# Patient Record
Sex: FEMALE | Race: WHITE | NOT HISPANIC OR LATINO
[De-identification: names, ages, dates, MRNs, and addresses within clinical notes are randomized per-mention and may not be internally consistent; named-entity substitution may affect disease eponyms.]

---

## 2017-01-25 ENCOUNTER — APPOINTMENT (OUTPATIENT)
Dept: ORTHOPEDIC SURGERY | Facility: CLINIC | Age: 62
End: 2017-01-25

## 2017-01-25 VITALS
BODY MASS INDEX: 39.99 KG/M2 | DIASTOLIC BLOOD PRESSURE: 69 MMHG | SYSTOLIC BLOOD PRESSURE: 108 MMHG | WEIGHT: 240 LBS | HEIGHT: 65 IN | TEMPERATURE: 97.9 F | HEART RATE: 73 BPM

## 2017-01-25 DIAGNOSIS — E78.00 PURE HYPERCHOLESTEROLEMIA, UNSPECIFIED: ICD-10-CM

## 2017-01-25 DIAGNOSIS — Z82.61 FAMILY HISTORY OF ARTHRITIS: ICD-10-CM

## 2017-01-25 DIAGNOSIS — Z86.79 PERSONAL HISTORY OF OTHER DISEASES OF THE CIRCULATORY SYSTEM: ICD-10-CM

## 2017-01-25 DIAGNOSIS — Z87.39 PERSONAL HISTORY OF OTHER DISEASES OF THE MUSCULOSKELETAL SYSTEM AND CONNECTIVE TISSUE: ICD-10-CM

## 2017-01-25 DIAGNOSIS — M19.90 UNSPECIFIED OSTEOARTHRITIS, UNSPECIFIED SITE: ICD-10-CM

## 2017-01-25 DIAGNOSIS — Z87.891 PERSONAL HISTORY OF NICOTINE DEPENDENCE: ICD-10-CM

## 2017-01-25 DIAGNOSIS — Z78.9 OTHER SPECIFIED HEALTH STATUS: ICD-10-CM

## 2017-01-25 DIAGNOSIS — Z80.9 FAMILY HISTORY OF MALIGNANT NEOPLASM, UNSPECIFIED: ICD-10-CM

## 2017-02-13 ENCOUNTER — OUTPATIENT (OUTPATIENT)
Dept: OUTPATIENT SERVICES | Facility: HOSPITAL | Age: 62
LOS: 1 days | End: 2017-02-13
Payer: COMMERCIAL

## 2017-02-13 ENCOUNTER — APPOINTMENT (OUTPATIENT)
Dept: MAMMOGRAPHY | Facility: IMAGING CENTER | Age: 62
End: 2017-02-13

## 2017-02-13 DIAGNOSIS — Z12.31 ENCOUNTER FOR SCREENING MAMMOGRAM FOR MALIGNANT NEOPLASM OF BREAST: ICD-10-CM

## 2017-02-13 PROCEDURE — 77067 SCR MAMMO BI INCL CAD: CPT

## 2017-02-13 PROCEDURE — 77063 BREAST TOMOSYNTHESIS BI: CPT

## 2017-03-24 ENCOUNTER — APPOINTMENT (OUTPATIENT)
Dept: ORTHOPEDIC SURGERY | Facility: CLINIC | Age: 62
End: 2017-03-24

## 2017-03-24 VITALS
WEIGHT: 240 LBS | TEMPERATURE: 97.8 F | HEIGHT: 65 IN | BODY MASS INDEX: 39.99 KG/M2 | HEART RATE: 73 BPM | SYSTOLIC BLOOD PRESSURE: 127 MMHG | DIASTOLIC BLOOD PRESSURE: 78 MMHG

## 2017-03-24 DIAGNOSIS — M17.11 UNILATERAL PRIMARY OSTEOARTHRITIS, RIGHT KNEE: ICD-10-CM

## 2017-03-24 DIAGNOSIS — M17.12 UNILATERAL PRIMARY OSTEOARTHRITIS, LEFT KNEE: ICD-10-CM

## 2017-05-18 ENCOUNTER — OUTPATIENT (OUTPATIENT)
Dept: OUTPATIENT SERVICES | Facility: HOSPITAL | Age: 62
LOS: 1 days | End: 2017-05-18
Payer: COMMERCIAL

## 2017-05-18 VITALS
HEART RATE: 74 BPM | TEMPERATURE: 98 F | HEIGHT: 65 IN | RESPIRATION RATE: 16 BRPM | WEIGHT: 222.23 LBS | SYSTOLIC BLOOD PRESSURE: 131 MMHG | DIASTOLIC BLOOD PRESSURE: 78 MMHG

## 2017-05-18 DIAGNOSIS — I10 ESSENTIAL (PRIMARY) HYPERTENSION: ICD-10-CM

## 2017-05-18 DIAGNOSIS — M17.0 BILATERAL PRIMARY OSTEOARTHRITIS OF KNEE: ICD-10-CM

## 2017-05-18 DIAGNOSIS — Z98.890 OTHER SPECIFIED POSTPROCEDURAL STATES: Chronic | ICD-10-CM

## 2017-05-18 DIAGNOSIS — Z98.1 ARTHRODESIS STATUS: Chronic | ICD-10-CM

## 2017-05-18 DIAGNOSIS — Z01.818 ENCOUNTER FOR OTHER PREPROCEDURAL EXAMINATION: ICD-10-CM

## 2017-05-18 DIAGNOSIS — Z90.710 ACQUIRED ABSENCE OF BOTH CERVIX AND UTERUS: Chronic | ICD-10-CM

## 2017-05-18 DIAGNOSIS — Z90.49 ACQUIRED ABSENCE OF OTHER SPECIFIED PARTS OF DIGESTIVE TRACT: Chronic | ICD-10-CM

## 2017-05-18 LAB
ALBUMIN SERPL ELPH-MCNC: 4.3 G/DL — SIGNIFICANT CHANGE UP (ref 3.3–5.2)
ALP SERPL-CCNC: 83 U/L — SIGNIFICANT CHANGE UP (ref 40–120)
ALT FLD-CCNC: 16 U/L — SIGNIFICANT CHANGE UP
ANION GAP SERPL CALC-SCNC: 12 MMOL/L — SIGNIFICANT CHANGE UP (ref 5–17)
APTT BLD: 33.4 SEC — SIGNIFICANT CHANGE UP (ref 27.5–37.4)
AST SERPL-CCNC: 17 U/L — SIGNIFICANT CHANGE UP
BASOPHILS # BLD AUTO: 0.1 K/UL — SIGNIFICANT CHANGE UP (ref 0–0.2)
BASOPHILS NFR BLD AUTO: 1 % — SIGNIFICANT CHANGE UP (ref 0–2)
BILIRUB SERPL-MCNC: 0.3 MG/DL — LOW (ref 0.4–2)
BLD GP AB SCN SERPL QL: SIGNIFICANT CHANGE UP
BUN SERPL-MCNC: 18 MG/DL — SIGNIFICANT CHANGE UP (ref 8–20)
CALCIUM SERPL-MCNC: 10.5 MG/DL — HIGH (ref 8.6–10.2)
CHLORIDE SERPL-SCNC: 101 MMOL/L — SIGNIFICANT CHANGE UP (ref 98–107)
CO2 SERPL-SCNC: 28 MMOL/L — SIGNIFICANT CHANGE UP (ref 22–29)
CREAT SERPL-MCNC: 0.76 MG/DL — SIGNIFICANT CHANGE UP (ref 0.5–1.3)
EOSINOPHIL # BLD AUTO: 0.1 K/UL — SIGNIFICANT CHANGE UP (ref 0–0.5)
EOSINOPHIL NFR BLD AUTO: 1.6 % — SIGNIFICANT CHANGE UP (ref 0–6)
GLUCOSE SERPL-MCNC: 111 MG/DL — SIGNIFICANT CHANGE UP (ref 70–115)
HCT VFR BLD CALC: 42 % — SIGNIFICANT CHANGE UP (ref 37–47)
HGB BLD-MCNC: 14 G/DL — SIGNIFICANT CHANGE UP (ref 12–16)
INR BLD: 0.97 RATIO — SIGNIFICANT CHANGE UP (ref 0.88–1.16)
LYMPHOCYTES # BLD AUTO: 2.1 K/UL — SIGNIFICANT CHANGE UP (ref 1–4.8)
LYMPHOCYTES # BLD AUTO: 33.9 % — SIGNIFICANT CHANGE UP (ref 20–55)
MCHC RBC-ENTMCNC: 30.5 PG — SIGNIFICANT CHANGE UP (ref 27–31)
MCHC RBC-ENTMCNC: 33.3 G/DL — SIGNIFICANT CHANGE UP (ref 32–36)
MCV RBC AUTO: 91.5 FL — SIGNIFICANT CHANGE UP (ref 81–99)
MONOCYTES # BLD AUTO: 0.6 K/UL — SIGNIFICANT CHANGE UP (ref 0–0.8)
MONOCYTES NFR BLD AUTO: 10.1 % — HIGH (ref 3–10)
MRSA PCR RESULT.: SIGNIFICANT CHANGE UP
NEUTROPHILS # BLD AUTO: 3.3 K/UL — SIGNIFICANT CHANGE UP (ref 1.8–8)
NEUTROPHILS NFR BLD AUTO: 53.2 % — SIGNIFICANT CHANGE UP (ref 37–73)
PLATELET # BLD AUTO: 291 K/UL — SIGNIFICANT CHANGE UP (ref 150–400)
POTASSIUM SERPL-MCNC: 4.8 MMOL/L — SIGNIFICANT CHANGE UP (ref 3.5–5.3)
POTASSIUM SERPL-SCNC: 4.8 MMOL/L — SIGNIFICANT CHANGE UP (ref 3.5–5.3)
PROT SERPL-MCNC: 7 G/DL — SIGNIFICANT CHANGE UP (ref 6.6–8.7)
PROTHROM AB SERPL-ACNC: 10.7 SEC — SIGNIFICANT CHANGE UP (ref 9.8–12.7)
RBC # BLD: 4.59 M/UL — SIGNIFICANT CHANGE UP (ref 4.4–5.2)
RBC # FLD: 14.5 % — SIGNIFICANT CHANGE UP (ref 11–15.6)
S AUREUS DNA NOSE QL NAA+PROBE: DETECTED
SODIUM SERPL-SCNC: 141 MMOL/L — SIGNIFICANT CHANGE UP (ref 135–145)
TYPE + AB SCN PNL BLD: SIGNIFICANT CHANGE UP
WBC # BLD: 6.1 K/UL — SIGNIFICANT CHANGE UP (ref 4.8–10.8)
WBC # FLD AUTO: 6.1 K/UL — SIGNIFICANT CHANGE UP (ref 4.8–10.8)

## 2017-05-18 PROCEDURE — 93005 ELECTROCARDIOGRAM TRACING: CPT

## 2017-05-18 PROCEDURE — 80053 COMPREHEN METABOLIC PANEL: CPT

## 2017-05-18 PROCEDURE — 86901 BLOOD TYPING SEROLOGIC RH(D): CPT

## 2017-05-18 PROCEDURE — 86900 BLOOD TYPING SEROLOGIC ABO: CPT

## 2017-05-18 PROCEDURE — 87641 MR-STAPH DNA AMP PROBE: CPT

## 2017-05-18 PROCEDURE — 93010 ELECTROCARDIOGRAM REPORT: CPT

## 2017-05-18 PROCEDURE — 86850 RBC ANTIBODY SCREEN: CPT

## 2017-05-18 PROCEDURE — G0463: CPT

## 2017-05-18 PROCEDURE — 85730 THROMBOPLASTIN TIME PARTIAL: CPT

## 2017-05-18 PROCEDURE — 87640 STAPH A DNA AMP PROBE: CPT

## 2017-05-18 PROCEDURE — 85610 PROTHROMBIN TIME: CPT

## 2017-05-18 PROCEDURE — 85027 COMPLETE CBC AUTOMATED: CPT

## 2017-05-18 NOTE — H&P PST ADULT - PSH
S/P carpal tunnel release  R  S/P cholecystectomy    S/P hernia repair    S/P hysterectomy    S/P lumbar fusion    S/P shoulder surgery  bilat rotator cuff  S/P thyroid surgery

## 2017-05-18 NOTE — H&P PST ADULT - NSANTHOSAYNRD_GEN_A_CORE
No. EV screening performed.  STOP BANG Legend: 0-2 = LOW Risk; 3-4 = INTERMEDIATE Risk; 5-8 = HIGH Risk

## 2017-05-18 NOTE — H&P PST ADULT - FAMILY HISTORY
Father  Still living? No  Family history of lung cancer, Age at diagnosis: 61-70     Mother  Still living? No  Family history of coronary artery disease, Age at diagnosis: 71-80

## 2017-05-19 RX ORDER — MUPIROCIN 20 MG/G
1 OINTMENT TOPICAL
Qty: 1 | Refills: 0 | OUTPATIENT
Start: 2017-05-19 | End: 2017-05-24

## 2017-06-02 ENCOUNTER — APPOINTMENT (OUTPATIENT)
Dept: ORTHOPEDIC SURGERY | Facility: CLINIC | Age: 62
End: 2017-06-02
Payer: COMMERCIAL

## 2017-06-02 VITALS
WEIGHT: 240 LBS | DIASTOLIC BLOOD PRESSURE: 70 MMHG | HEART RATE: 91 BPM | TEMPERATURE: 97.9 F | BODY MASS INDEX: 39.99 KG/M2 | SYSTOLIC BLOOD PRESSURE: 126 MMHG | HEIGHT: 65 IN

## 2017-06-02 PROCEDURE — 99214 OFFICE O/P EST MOD 30 MIN: CPT | Mod: 25

## 2017-06-02 PROCEDURE — 73502 X-RAY EXAM HIP UNI 2-3 VIEWS: CPT | Mod: LT

## 2017-06-02 PROCEDURE — 20610 DRAIN/INJ JOINT/BURSA W/O US: CPT | Mod: LT

## 2017-06-06 RX ORDER — CELECOXIB 200 MG/1
400 CAPSULE ORAL ONCE
Qty: 0 | Refills: 0 | Status: COMPLETED | OUTPATIENT
Start: 2017-06-08 | End: 2017-06-08

## 2017-06-06 RX ORDER — GABAPENTIN 400 MG/1
600 CAPSULE ORAL ONCE
Qty: 0 | Refills: 0 | Status: COMPLETED | OUTPATIENT
Start: 2017-06-08 | End: 2017-06-08

## 2017-06-06 RX ORDER — VANCOMYCIN HCL 1 G
1500 VIAL (EA) INTRAVENOUS ONCE
Qty: 0 | Refills: 0 | Status: DISCONTINUED | OUTPATIENT
Start: 2017-06-08 | End: 2017-06-13

## 2017-06-06 RX ORDER — OXYCODONE HYDROCHLORIDE 5 MG/1
20 TABLET ORAL ONCE
Qty: 0 | Refills: 0 | Status: DISCONTINUED | OUTPATIENT
Start: 2017-06-08 | End: 2017-06-08

## 2017-06-08 ENCOUNTER — RESULT REVIEW (OUTPATIENT)
Age: 62
End: 2017-06-08

## 2017-06-08 ENCOUNTER — INPATIENT (INPATIENT)
Facility: HOSPITAL | Age: 62
LOS: 4 days | Discharge: NURSING, REHAB OR LONG-TERM CARE | DRG: 462 | End: 2017-06-13
Attending: ORTHOPAEDIC SURGERY | Admitting: ORTHOPAEDIC SURGERY
Payer: COMMERCIAL

## 2017-06-08 ENCOUNTER — APPOINTMENT (OUTPATIENT)
Dept: ORTHOPEDIC SURGERY | Facility: HOSPITAL | Age: 62
End: 2017-06-08

## 2017-06-08 VITALS
SYSTOLIC BLOOD PRESSURE: 146 MMHG | TEMPERATURE: 98 F | DIASTOLIC BLOOD PRESSURE: 75 MMHG | RESPIRATION RATE: 16 BRPM | WEIGHT: 222.01 LBS | OXYGEN SATURATION: 100 % | HEIGHT: 65 IN | HEART RATE: 68 BPM

## 2017-06-08 DIAGNOSIS — M17.0 BILATERAL PRIMARY OSTEOARTHRITIS OF KNEE: ICD-10-CM

## 2017-06-08 DIAGNOSIS — Z29.9 ENCOUNTER FOR PROPHYLACTIC MEASURES, UNSPECIFIED: ICD-10-CM

## 2017-06-08 DIAGNOSIS — Z98.890 OTHER SPECIFIED POSTPROCEDURAL STATES: Chronic | ICD-10-CM

## 2017-06-08 DIAGNOSIS — M1A.0720 IDIOPATHIC CHRONIC GOUT, LEFT ANKLE AND FOOT, WITHOUT TOPHUS (TOPHI): ICD-10-CM

## 2017-06-08 DIAGNOSIS — Z96.653 PRESENCE OF ARTIFICIAL KNEE JOINT, BILATERAL: ICD-10-CM

## 2017-06-08 DIAGNOSIS — Z90.49 ACQUIRED ABSENCE OF OTHER SPECIFIED PARTS OF DIGESTIVE TRACT: Chronic | ICD-10-CM

## 2017-06-08 DIAGNOSIS — E04.1 NONTOXIC SINGLE THYROID NODULE: ICD-10-CM

## 2017-06-08 DIAGNOSIS — Z98.1 ARTHRODESIS STATUS: Chronic | ICD-10-CM

## 2017-06-08 DIAGNOSIS — I10 ESSENTIAL (PRIMARY) HYPERTENSION: ICD-10-CM

## 2017-06-08 DIAGNOSIS — Z90.710 ACQUIRED ABSENCE OF BOTH CERVIX AND UTERUS: Chronic | ICD-10-CM

## 2017-06-08 LAB
BLD GP AB SCN SERPL QL: SIGNIFICANT CHANGE UP
TYPE + AB SCN PNL BLD: SIGNIFICANT CHANGE UP

## 2017-06-08 PROCEDURE — 27447 TOTAL KNEE ARTHROPLASTY: CPT | Mod: 50

## 2017-06-08 PROCEDURE — 73562 X-RAY EXAM OF KNEE 3: CPT | Mod: 26,50

## 2017-06-08 PROCEDURE — 99232 SBSQ HOSP IP/OBS MODERATE 35: CPT

## 2017-06-08 PROCEDURE — 20985 CPTR-ASST DIR MS PX: CPT | Mod: 59

## 2017-06-08 PROCEDURE — 27447 TOTAL KNEE ARTHROPLASTY: CPT | Mod: AS,50

## 2017-06-08 PROCEDURE — 73560 X-RAY EXAM OF KNEE 1 OR 2: CPT | Mod: 26,50

## 2017-06-08 PROCEDURE — 88305 TISSUE EXAM BY PATHOLOGIST: CPT | Mod: 26

## 2017-06-08 PROCEDURE — 88311 DECALCIFY TISSUE: CPT | Mod: 26

## 2017-06-08 RX ORDER — ACETAMINOPHEN 500 MG
1000 TABLET ORAL ONCE
Qty: 0 | Refills: 0 | Status: DISCONTINUED | OUTPATIENT
Start: 2017-06-08 | End: 2017-06-08

## 2017-06-08 RX ORDER — FOLIC ACID 0.8 MG
1 TABLET ORAL DAILY
Qty: 0 | Refills: 0 | Status: DISCONTINUED | OUTPATIENT
Start: 2017-06-08 | End: 2017-06-13

## 2017-06-08 RX ORDER — OXYCODONE HYDROCHLORIDE 5 MG/1
5 TABLET ORAL
Qty: 0 | Refills: 0 | Status: DISCONTINUED | OUTPATIENT
Start: 2017-06-08 | End: 2017-06-13

## 2017-06-08 RX ORDER — SODIUM CHLORIDE 9 MG/ML
3 INJECTION INTRAMUSCULAR; INTRAVENOUS; SUBCUTANEOUS EVERY 8 HOURS
Qty: 0 | Refills: 0 | Status: DISCONTINUED | OUTPATIENT
Start: 2017-06-08 | End: 2017-06-08

## 2017-06-08 RX ORDER — FAMOTIDINE 10 MG/ML
20 INJECTION INTRAVENOUS
Qty: 0 | Refills: 0 | Status: DISCONTINUED | OUTPATIENT
Start: 2017-06-08 | End: 2017-06-13

## 2017-06-08 RX ORDER — FERROUS SULFATE 325(65) MG
325 TABLET ORAL
Qty: 0 | Refills: 0 | Status: DISCONTINUED | OUTPATIENT
Start: 2017-06-08 | End: 2017-06-13

## 2017-06-08 RX ORDER — METOPROLOL TARTRATE 50 MG
100 TABLET ORAL
Qty: 0 | Refills: 0 | Status: DISCONTINUED | OUTPATIENT
Start: 2017-06-08 | End: 2017-06-12

## 2017-06-08 RX ORDER — METOPROLOL TARTRATE 50 MG
50 TABLET ORAL
Qty: 0 | Refills: 0 | Status: DISCONTINUED | OUTPATIENT
Start: 2017-06-08 | End: 2017-06-13

## 2017-06-08 RX ORDER — ENOXAPARIN SODIUM 100 MG/ML
30 INJECTION SUBCUTANEOUS
Qty: 0 | Refills: 0 | Status: DISCONTINUED | OUTPATIENT
Start: 2017-06-08 | End: 2017-06-13

## 2017-06-08 RX ORDER — DOCUSATE SODIUM 100 MG
100 CAPSULE ORAL THREE TIMES A DAY
Qty: 0 | Refills: 0 | Status: DISCONTINUED | OUTPATIENT
Start: 2017-06-08 | End: 2017-06-13

## 2017-06-08 RX ORDER — CEFAZOLIN SODIUM 1 G
2000 VIAL (EA) INJECTION ONCE
Qty: 0 | Refills: 0 | Status: DISCONTINUED | OUTPATIENT
Start: 2017-06-08 | End: 2017-06-08

## 2017-06-08 RX ORDER — SENNA PLUS 8.6 MG/1
2 TABLET ORAL AT BEDTIME
Qty: 0 | Refills: 0 | Status: DISCONTINUED | OUTPATIENT
Start: 2017-06-08 | End: 2017-06-13

## 2017-06-08 RX ORDER — MAGNESIUM HYDROXIDE 400 MG/1
30 TABLET, CHEWABLE ORAL DAILY
Qty: 0 | Refills: 0 | Status: DISCONTINUED | OUTPATIENT
Start: 2017-06-08 | End: 2017-06-13

## 2017-06-08 RX ORDER — KETOROLAC TROMETHAMINE 30 MG/ML
15 SYRINGE (ML) INJECTION EVERY 6 HOURS
Qty: 0 | Refills: 0 | Status: DISCONTINUED | OUTPATIENT
Start: 2017-06-08 | End: 2017-06-08

## 2017-06-08 RX ORDER — FENTANYL CITRATE 50 UG/ML
50 INJECTION INTRAVENOUS
Qty: 0 | Refills: 0 | Status: DISCONTINUED | OUTPATIENT
Start: 2017-06-08 | End: 2017-06-08

## 2017-06-08 RX ORDER — TRANEXAMIC ACID 100 MG/ML
1000 INJECTION, SOLUTION INTRAVENOUS ONCE
Qty: 0 | Refills: 0 | Status: DISCONTINUED | OUTPATIENT
Start: 2017-06-08 | End: 2017-06-08

## 2017-06-08 RX ORDER — OXYCODONE HYDROCHLORIDE 5 MG/1
10 TABLET ORAL
Qty: 0 | Refills: 0 | Status: DISCONTINUED | OUTPATIENT
Start: 2017-06-08 | End: 2017-06-13

## 2017-06-08 RX ORDER — VALSARTAN 80 MG/1
80 TABLET ORAL DAILY
Qty: 0 | Refills: 0 | Status: DISCONTINUED | OUTPATIENT
Start: 2017-06-08 | End: 2017-06-11

## 2017-06-08 RX ORDER — ALLOPURINOL 300 MG
300 TABLET ORAL DAILY
Qty: 0 | Refills: 0 | Status: DISCONTINUED | OUTPATIENT
Start: 2017-06-08 | End: 2017-06-13

## 2017-06-08 RX ORDER — ONDANSETRON 8 MG/1
4 TABLET, FILM COATED ORAL ONCE
Qty: 0 | Refills: 0 | Status: DISCONTINUED | OUTPATIENT
Start: 2017-06-08 | End: 2017-06-08

## 2017-06-08 RX ORDER — VANCOMYCIN HCL 1 G
1500 VIAL (EA) INTRAVENOUS
Qty: 0 | Refills: 0 | Status: COMPLETED | OUTPATIENT
Start: 2017-06-08 | End: 2017-06-08

## 2017-06-08 RX ORDER — CEFAZOLIN SODIUM 1 G
2000 VIAL (EA) INJECTION
Qty: 0 | Refills: 0 | Status: COMPLETED | OUTPATIENT
Start: 2017-06-08 | End: 2017-06-08

## 2017-06-08 RX ORDER — FUROSEMIDE 40 MG
20 TABLET ORAL DAILY
Qty: 0 | Refills: 0 | Status: DISCONTINUED | OUTPATIENT
Start: 2017-06-09 | End: 2017-06-13

## 2017-06-08 RX ORDER — HYDROMORPHONE HYDROCHLORIDE 2 MG/ML
0.5 INJECTION INTRAMUSCULAR; INTRAVENOUS; SUBCUTANEOUS EVERY 4 HOURS
Qty: 0 | Refills: 0 | Status: DISCONTINUED | OUTPATIENT
Start: 2017-06-08 | End: 2017-06-13

## 2017-06-08 RX ORDER — ACETAMINOPHEN 500 MG
650 TABLET ORAL EVERY 6 HOURS
Qty: 0 | Refills: 0 | Status: DISCONTINUED | OUTPATIENT
Start: 2017-06-08 | End: 2017-06-13

## 2017-06-08 RX ORDER — SODIUM CHLORIDE 9 MG/ML
1000 INJECTION, SOLUTION INTRAVENOUS
Qty: 0 | Refills: 0 | Status: DISCONTINUED | OUTPATIENT
Start: 2017-06-08 | End: 2017-06-09

## 2017-06-08 RX ORDER — OXYCODONE HYDROCHLORIDE 5 MG/1
10 TABLET ORAL EVERY 12 HOURS
Qty: 0 | Refills: 0 | Status: DISCONTINUED | OUTPATIENT
Start: 2017-06-08 | End: 2017-06-10

## 2017-06-08 RX ORDER — ONDANSETRON 8 MG/1
4 TABLET, FILM COATED ORAL EVERY 6 HOURS
Qty: 0 | Refills: 0 | Status: DISCONTINUED | OUTPATIENT
Start: 2017-06-08 | End: 2017-06-13

## 2017-06-08 RX ADMIN — OXYCODONE HYDROCHLORIDE 10 MILLIGRAM(S): 5 TABLET ORAL at 21:49

## 2017-06-08 RX ADMIN — Medication 100 MILLIGRAM(S): at 21:07

## 2017-06-08 RX ADMIN — Medication 300 MILLIGRAM(S): at 19:47

## 2017-06-08 RX ADMIN — OXYCODONE HYDROCHLORIDE 20 MILLIGRAM(S): 5 TABLET ORAL at 08:32

## 2017-06-08 RX ADMIN — Medication 15 MILLIGRAM(S): at 23:37

## 2017-06-08 RX ADMIN — Medication 15 MILLIGRAM(S): at 18:37

## 2017-06-08 RX ADMIN — Medication 15 MILLIGRAM(S): at 19:30

## 2017-06-08 RX ADMIN — OXYCODONE HYDROCHLORIDE 10 MILLIGRAM(S): 5 TABLET ORAL at 16:13

## 2017-06-08 RX ADMIN — CELECOXIB 400 MILLIGRAM(S): 200 CAPSULE ORAL at 08:31

## 2017-06-08 RX ADMIN — Medication 15 MILLIGRAM(S): at 23:59

## 2017-06-08 RX ADMIN — OXYCODONE HYDROCHLORIDE 10 MILLIGRAM(S): 5 TABLET ORAL at 18:37

## 2017-06-08 RX ADMIN — OXYCODONE HYDROCHLORIDE 10 MILLIGRAM(S): 5 TABLET ORAL at 21:10

## 2017-06-08 RX ADMIN — FAMOTIDINE 20 MILLIGRAM(S): 10 INJECTION INTRAVENOUS at 18:42

## 2017-06-08 RX ADMIN — OXYCODONE HYDROCHLORIDE 10 MILLIGRAM(S): 5 TABLET ORAL at 16:43

## 2017-06-08 RX ADMIN — Medication 100 MILLIGRAM(S): at 21:08

## 2017-06-08 RX ADMIN — Medication 100 MILLIGRAM(S): at 16:08

## 2017-06-08 RX ADMIN — GABAPENTIN 600 MILLIGRAM(S): 400 CAPSULE ORAL at 08:32

## 2017-06-08 RX ADMIN — OXYCODONE HYDROCHLORIDE 10 MILLIGRAM(S): 5 TABLET ORAL at 19:07

## 2017-06-08 NOTE — DISCHARGE NOTE ADULT - PLAN OF CARE
Decrease Pain, Improve Ambulation and Activities of Daily Living The patient will be seen in the office between 2-3 weeks for wound check. Tape will be removed at that time. Patient may shower after post-op day #5. The dressing is to be removed on day # 7 (6/16/2017).  The patient will contact the office if the wound becomes red, has increasing pain, develops bleeding or discharge, an injury occurs, or has other concerns. The patient will continue PT consistent with total knee replacement. The patient will continue LOVENOX for 2 weeks and then begin ASPIRIN for DVTP. The patient will take OXYCODONE and TYLENOL for pain control and titrate according to prescription and patient needs. The patient is FULL weight bearing. Elevation of the lower leg is recommended to reduce swelling.

## 2017-06-08 NOTE — DISCHARGE NOTE ADULT - CARE PLAN
Principal Discharge DX:	Primary osteoarthritis of both knees  Goal:	Decrease Pain, Improve Ambulation and Activities of Daily Living  Instructions for follow-up, activity and diet:	The patient will be seen in the office between 2-3 weeks for wound check. Tape will be removed at that time. Patient may shower after post-op day #5. The dressing is to be removed on day # 7 (6/16/2017).  The patient will contact the office if the wound becomes red, has increasing pain, develops bleeding or discharge, an injury occurs, or has other concerns. The patient will continue PT consistent with total knee replacement. The patient will continue LOVENOX for 2 weeks and then begin ASPIRIN for DVTP. The patient will take OXYCODONE and TYLENOL for pain control and titrate according to prescription and patient needs. The patient is FULL weight bearing. Elevation of the lower leg is recommended to reduce swelling.

## 2017-06-08 NOTE — PHYSICAL THERAPY INITIAL EVALUATION ADULT - IMPAIRMENTS CONTRIBUTING TO GAIT DEVIATIONS, PT EVAL
impaired sensory feedback/impaired coordination/decreased strength/+ LOB x2 2*2 left knee buckling, verbal cues needed to maintain eyes open, standing rest breaks x2 2*2 c/o dizziness, chair follow for final 25 feet for safety 2*2 c/o dizziness and knee buckling/impaired motor control/decreased ROM/impaired balance

## 2017-06-08 NOTE — PHYSICAL THERAPY INITIAL EVALUATION ADULT - CRITERIA FOR SKILLED THERAPEUTIC INTERVENTIONS
Home with home PT, RW/anticipated discharge recommendation/anticipated equipment needs at discharge/impairments found

## 2017-06-08 NOTE — PHYSICAL THERAPY INITIAL EVALUATION ADULT - ADDITIONAL COMMENTS
Pt lives in a private home with her , 3 steps to enter with handrails, 13 steps inside with handrails to bedrooms. Pt was independent PTA without assist device. Pt owns RW, raised toilet seat and shower chair.

## 2017-06-08 NOTE — DISCHARGE NOTE ADULT - CARE PROVIDER_API CALL
Agus Harrell), Orthopaedic Surgery  [address] [place] [address]  Phone: [phone]  Fax: [phone]

## 2017-06-08 NOTE — PHYSICAL THERAPY INITIAL EVALUATION ADULT - RANGE OF MOTION EXAMINATION, REHAB EVAL
bilateral knee flexion to approx 35-40 degrees (in supine) left knee extension lacking approx 5-10 degrees extension, Right knee lacking approx 10-15 degrees extension/bilateral upper extremity ROM was WFL (within functional limits)

## 2017-06-08 NOTE — CONSULT NOTE ADULT - SUBJECTIVE AND OBJECTIVE BOX
PMD :Collin   Cardio :     Patient seen and examined , s/p B/L TKA , POD # 0 , seen in PACU      HPI:  62 yo female with arthritis bilateral knee worsening over years      PAST MEDICAL & SURGICAL HISTORY:  Thyroid nodule  Hypertension  S/P hysterectomy  S/P carpal tunnel release: R  S/P thyroid surgery  S/P hernia repair  S/P shoulder surgery: bilat rotator cuff  S/P lumbar fusion  S/P cholecystectomy      Social History:  · Marital Status	  · Occupation	clerical  · Lives With	spouse    Substance Use History:  · Substance Use	caffeine  · Caffeine Type	coffee  · Caffeine Amount/Frequency	1-2 cups/cans per day    Alcohol Use History:  · Have you ever consumed alcohol	never    Tobacco Usage:  · Tobacco Usage: Former smoker  · Tobacco Type: cigarettes  using e cig  · Number of Packs per Day: 1  · Number of yrs: 12  · Pack yrs: 12  · Longest Period Tobacco-Free: 1 year        FAMILY HISTORY:  Family history of coronary artery disease (Mother)  Family history of lung cancer (Father)      Allergies    No Known Allergies    Intolerances        HOME MEDICATIONS :     · 	metoprolol tartrate 100 mg oral tablet: Last Dose Taken:  , 1 tab(s) orally once a day  · 	valsartan 80 mg oral tablet: Last Dose Taken:  , 1 tab(s) orally once a day  · 	Lasix 20 mg oral tablet: Last Dose Taken:  , 1 tab(s) orally once a day  · 	allopurinol 300 mg oral tablet: Last Dose Taken:  , 1 tab(s) orally once a day  · 	meloxicam 15 mg oral tablet: Last Dose Taken:  , 1 tab(s) orally once a day  · 	Zantac 150 oral tablet: Last Dose Taken:  , 1 tab(s) orally 2 times a day  · 	Colace 100 mg oral capsule: Last Dose Taken:  , 1 cap(s) orally 2 times a day  · 	oxycodone-acetaminophen 5mg-325mg oral tablet: Last Dose Taken:  , 1 tab(s) orally 2 times a day, As Needed  · 	Folbic oral tablet: Last Dose Taken:  , 1 tab(s) orally once a day  · 	Vitamin D2 50,000 intl units (1.25 mg) oral capsule: Last Dose Taken:  , 1 cap(s) orally once a week    REVIEW OF SYSTEMS:    CONSTITUTIONAL: No fever, weight loss, or fatigue  EYES: No eye pain, visual disturbances, or discharge  NECK: No pain or stiffness  RESPIRATORY: No cough, wheezing, chills or hemoptysis; No shortness of breath  CARDIOVASCULAR: No chest pain, palpitations, dizziness, or leg swelling  GASTROINTESTINAL: No abdominal or epigastric pain. No nausea, vomiting, or hematemesis; No diarrhea or constipation. No melena or hematochezia.  GENITOURINARY: No dysuria, frequency, hematuria, or incontinence  NEUROLOGICAL: No headaches, memory loss, loss of strength, numbness, or tremors  SKIN: No itching, burning, rashes, or lesions   LYMPH NODES: No enlarged glands  ENDOCRINE: No heat or cold intolerance; No hair loss  MUSCULOSKELETAL: B/L knee pain   PSYCHIATRIC: No depression, anxiety, mood swings, or difficulty sleeping  HEME/LYMPH: No easy bruising, or bleeding gums  ALLERGY AND IMMUNOLOGIC: No hives or eczema    MEDICATIONS  (STANDING):  vancomycin  IVPB 1500milliGRAM(s) IV Intermittent once    MEDICATIONS  (PRN):  fentaNYL    Injectable 50MICROGram(s) IV Push every 5 minutes PRN Severe Pain  ondansetron Injectable 4milliGRAM(s) IV Push once PRN Nausea and/or Vomiting      Vital Signs Last 24 Hrs  T(C): 36.8, Max: 36.8 (06-08 @ 07:40)  T(F): 98.2, Max: 98.2 (06-08 @ 07:40)  HR: 68 (68 - 68)  BP: 146/75 (146/75 - 146/75)  BP(mean): --  RR: 16 (16 - 16)  SpO2: 100% (100% - 100%)    PHYSICAL EXAM:    GENERAL: NAD, well-groomed, well-developed  HEAD:  Atraumatic, Normocephalic  EYES: EOMI, PERRLA, conjunctiva and sclera clear  NECK: Supple, No JVD, Normal thyroid  NERVOUS SYSTEM:  Alert & Oriented X3, Good concentration; Motor Strength 5/5 B/L upper and lower extremities; DTRs 2+ intact and symmetric  CHEST/LUNG: CTA  b/l,  no rales, rhonchi, wheezing, or rubs  HEART: Regular rate and rhythm; No murmurs, rubs, or gallops  ABDOMEN: Soft, Nontender, Nondistended; Bowel sounds present  EXTREMITIES:  2+ Peripheral Pulses, No clubbing, cyanosis, or edema , b/l  knee dressing + , clean and dry   LYMPH: No lymphadenopathy noted  SKIN: No rashes or lesions    LABS: Pending         RADIOLOGY & ADDITIONAL STUDIES: PMD :Collin   Cardio :     Patient seen and examined , s/p B/L TKA , POD # 0 , seen in PACU    CC: B/L knee pain     HPI:  60 yo female with arthritis bilateral knee pain for about 10 yrs , state she got inj in both knees x 1 ( doesn't know what kind ) . was taking Advil and Oxycodone with some relief , lately pain worst , affecting ambulation , now s/p b/l TKA       PAST MEDICAL & SURGICAL HISTORY:  Thyroid nodule  Hypertension  Vitamin D def  S/P hysterectomy  S/P carpal tunnel release: R  S/P thyroid surgery  S/P hernia repair  S/P shoulder surgery: bilat rotator cuff  S/P lumbar fusion  S/P cholecystectomy      Social History:  · Marital Status	  · Occupation	clerical  · Lives With	spouse    Substance Use History:  · Substance Use	caffeine  · Caffeine Type	coffee  · Caffeine Amount/Frequency	1-2 cups/cans per day    Alcohol Use History:  · Have you ever consumed alcohol	never    Tobacco Usage:  · Tobacco Usage: Former smoker  · Tobacco Type: cigarettes  using e cig  · Number of Packs per Day: 1  · Number of yrs: 12  · Pack yrs: 12  · Longest Period Tobacco-Free: 1 year        FAMILY HISTORY:  Family history of coronary artery disease (Mother)  Family history of lung cancer (Father)      Allergies    No Known Allergies    Intolerances        HOME MEDICATIONS :     · 	metoprolol tartrate 100 mg oral table IN AM AND 50 MG qhs t: Last Dose Taken:    · 	valsartan 80 mg oral tablet: Last Dose Taken:  , 1 tab(s) orally once a day  · 	Lasix 20 mg oral tablet: Last Dose Taken:  , 1 tab(s) orally once a day  · 	allopurinol 300 mg oral tablet: Last Dose Taken:  , 1 tab(s) orally once a day  · 	meloxicam 15 mg oral tablet: Last Dose Taken:  , 1 tab(s) orally once a day  · 	Zantac 150 oral tablet: Last Dose Taken:  , 1 tab(s) orally 2 times a day  · 	Colace 100 mg oral capsule: Last Dose Taken:  , 1 cap(s) orally 2 times a day  · 	oxycodone-acetaminophen 5mg-325mg oral tablet: Last Dose Taken:  , 1 tab(s) orally 2 times a day, As Needed  · 	Folbic oral tablet: Last Dose Taken:  , 1 tab(s) orally once a day  · 	Vitamin D2 50,000 intl units (1.25 mg) oral capsule: Last Dose Taken:  , 1 cap(s) orally once a week    REVIEW OF SYSTEMS:    CONSTITUTIONAL: No fever, weight loss, or fatigue  EYES: No eye pain, visual disturbances, or discharge  NECK: No pain or stiffness  RESPIRATORY: No cough, wheezing, chills or hemoptysis; No shortness of breath  CARDIOVASCULAR: No chest pain, palpitations, dizziness, or leg swelling  GASTROINTESTINAL: No abdominal or epigastric pain. No nausea, vomiting, or hematemesis; No diarrhea or constipation. No melena or hematochezia.  GENITOURINARY: No dysuria, frequency, hematuria, or incontinence  NEUROLOGICAL: No headaches, memory loss, loss of strength, numbness, or tremors  SKIN: No itching, burning, rashes, or lesions   LYMPH NODES: No enlarged glands  ENDOCRINE: No heat or cold intolerance; No hair loss  MUSCULOSKELETAL: B/L knee pain   PSYCHIATRIC: No depression, anxiety, mood swings, or difficulty sleeping  HEME/LYMPH: No easy bruising, or bleeding gums  ALLERGY AND IMMUNOLOGIC: No hives or eczema    MEDICATIONS  (STANDING):  vancomycin  IVPB 1500milliGRAM(s) IV Intermittent once    MEDICATIONS  (PRN):  fentaNYL    Injectable 50MICROGram(s) IV Push every 5 minutes PRN Severe Pain  ondansetron Injectable 4milliGRAM(s) IV Push once PRN Nausea and/or Vomiting      Vital Signs Last 24 Hrs  T(C): 36.8, Max: 36.8 (06-08 @ 07:40)  T(F): 98.2, Max: 98.2 (06-08 @ 07:40)  HR: 68 (68 - 68)  BP: 146/75 (146/75 - 146/75)  BP(mean): --  RR: 16 (16 - 16)  SpO2: 100% (100% - 100%)    PHYSICAL EXAM:    GENERAL: NAD, well-groomed, well-developed  HEAD:  Atraumatic, Normocephalic  EYES: EOMI, PERRLA, conjunctiva and sclera clear  NECK: Supple, No JVD, Normal thyroid  NERVOUS SYSTEM:  Alert & Oriented X3, Good concentration; Motor Strength 5/5 B/L upper and lower extremities; DTRs 2+ intact and symmetric  CHEST/LUNG: CTA  b/l,  no rales, rhonchi, wheezing, or rubs  HEART: Regular rate and rhythm; No murmurs, rubs, or gallops  ABDOMEN: Soft, Nontender, Nondistended; Bowel sounds present  EXTREMITIES:  2+ Peripheral Pulses, No clubbing, cyanosis, or edema , b/l  knee dressing + , clean and dry   LYMPH: No lymphadenopathy noted  SKIN: No rashes or lesions    LABS: Pending         RADIOLOGY & ADDITIONAL STUDIES:

## 2017-06-08 NOTE — PHYSICAL THERAPY INITIAL EVALUATION ADULT - GENERAL OBSERVATIONS, REHAB EVAL
Pt received supine on stretcher in PACU, + telemetry + IV + Venous Compression Boots no c/o pain, agreeable to PT

## 2017-06-08 NOTE — PHYSICAL THERAPY INITIAL EVALUATION ADULT - IMPAIRED TRANSFERS: SIT/STAND, REHAB EVAL
decreased ROM/impaired sensory feedback/decreased proprioception in BLE/impaired balance/decreased strength

## 2017-06-08 NOTE — DISCHARGE NOTE ADULT - NS AS ACTIVITY OBS
Showering allowed/Do not drive or operate machinery/Stairs allowed/Walking-Indoors allowed/Walking-Outdoors allowed/Do not make important decisions/No Heavy lifting/straining

## 2017-06-08 NOTE — DISCHARGE NOTE ADULT - HOSPITAL COURSE
The patient underwent a BILATERAL TOTAL KNEE REPLACEMENT on 6/8/2017. The patient received antibiotics consistent with SCIP guidelines. The patient underwent the procedure and had no intra-operative complications. Post-operatively, the patient was seen by medicine and PT. The patient received LOVENOX for DVTP. The patient received pain medications per orthopedic pain management protocol and the pain was appropriately controlled. The patient did not have any post-operative medical complications. The patient was discharged in stable condition.

## 2017-06-08 NOTE — DISCHARGE NOTE ADULT - MEDICATION SUMMARY - MEDICATIONS TO TAKE
I will START or STAY ON the medications listed below when I get home from the hospital:    valsartan 80 mg oral tablet  -- 1 tab(s) by mouth once a day  -- Indication: For Home med    enoxaparin  -- 30 milligram(s) subcutaneous 2 times a day  -- Indication: For dvtp    allopurinol 300 mg oral tablet  -- 1 tab(s) by mouth once a day  -- Indication: For Home med    metoprolol tartrate 100 mg oral tablet  -- 1 tab(s) by mouth once a day  -- Indication: For Home med    Lasix 20 mg oral tablet  -- 1 tab(s) by mouth once a day  -- Indication: For Home med    Zantac 150 oral tablet  -- 1 tab(s) by mouth 2 times a day  -- Indication: For Home med    Colace 100 mg oral capsule  -- 1 cap(s) by mouth 2 times a day  -- Indication: For Home med    Folbic oral tablet  -- 1 tab(s) by mouth once a day  -- Indication: For Home med    Vitamin D2 50,000 intl units (1.25 mg) oral capsule  -- 1 cap(s) by mouth once a week  -- Indication: For Home med I will START or STAY ON the medications listed below when I get home from the hospital:    enoxaparin  -- 30 milligram(s) subcutaneous 2 times a day  -- Indication: For dvtp    allopurinol 300 mg oral tablet  -- 1 tab(s) by mouth once a day  -- Indication: For Home med    metoprolol tartrate 50 mg oral tablet  -- 1 tab(s) by mouth 2 times a day  -- Indication: For BP med    Lasix 20 mg oral tablet  -- 1 tab(s) by mouth once a day  -- Indication: For Home med    Zantac 150 oral tablet  -- 1 tab(s) by mouth 2 times a day  -- Indication: For Home med    Colace 100 mg oral capsule  -- 1 cap(s) by mouth 2 times a day  -- Indication: For Home med    Folbic oral tablet  -- 1 tab(s) by mouth once a day  -- Indication: For Home med    Vitamin D2 50,000 intl units (1.25 mg) oral capsule  -- 1 cap(s) by mouth once a week  -- Indication: For Home med

## 2017-06-08 NOTE — DISCHARGE NOTE ADULT - MEDICATION SUMMARY - MEDICATIONS TO STOP TAKING
I will STOP taking the medications listed below when I get home from the hospital:    meloxicam 15 mg oral tablet  -- 1 tab(s) by mouth once a day    mupirocin topical 2% topical ointment  -- 1 application in each nostril 2 times a day  -- For external use only.

## 2017-06-08 NOTE — DISCHARGE NOTE ADULT - PROCEDURE 1
Non weight bearing.  Splint should be kept on at all times and should not get wet.  Ice  Tylenol and ibuprofen for pain.  Splint cannot get wet and should be kept on at all times.    Follow up with [facility]- Call for appointment  [address]  [place]  [phone]    Foot Fracture  You have a broken bone (fracture) in your foot. This will cause pain, swelling, and sometimes bruising. It will take about 4 to 6 weeks to heal. A foot fracture may be treated with a special shoe, splint, cast, or boot.  Home care  Follow these guidelines when caring for yourself at home:  · You may be given a splint, cast, shoe, or boot to keep the injured area from moving. Unless you were told otherwise, use crutches or a walker. Don’t put weight on the injured foot until your health care provider says you can do so. (You can rent crutches and a walker at many pharmacies and surgical or orthopedic supply stores.) Don’t put weight on a splint, or it will break.  · Keep your leg elevated to reduce pain and swelling. When sleeping, put a pillow under the injured leg. When sitting, support the injured leg so it is level with your waist. This is very important during the first 2 days (48 hours).  · Put an ice pack on the injured area. Do this for 20 minutes every 1 to 2 hours the first day for pain relief. You can make an ice pack by wrapping a plastic bag of ice cubes in a thin towel. As the ice melts, be careful that the splint/cast/boot/shoe doesn’t get wet. You can place the ice pack directly over the splint or cast. Unless told otherwise, you can open the boot or shoe to apply the ice pack. Continue using the ice pack 3 to 4 times a day for the next 2 days. Then use the ice pack as needed to ease pain and swelling.  · Keep the splint/cast/boot/shoe dry. When bathing, protect it with a large plastic bag, rubber-banded at the top end. If a fiberglass splint or cast or boot gets wet, you can dry it with a hair dryer. Unless  told otherwise, you can take off the boot or shoe to bathe.  · You may use acetaminophen or ibuprofen to control pain, unless another pain medicine was prescribed. If you have chronic liver or kidney disease, talk with your health care provider before using these medicines. Also talk with your provider if you’ve had a stomach ulcer or GI bleeding.  · Don’t put creams or objects under the cast if you have itching.  Follow-up care  Follow up with your health care provider within 1 week, or as advised. This is to make sure the bone is healing the way it should. If you were given a splint, it may be changed to a cast or boot at your follow-up visit.  If X-rays were taken, a radiologist will look at them. You will be told of any new findings that may affect your care.  When to seek medical advice  Call your health care provider right away if any of these occur:  · The cast cracks  · The plaster cast or splint becomes wet or soft  · The fiberglass cast or splint stays wet for more than 24 hours  · Bad odor from the cast or wound fluid stains the cast  · Tightness or pain under the cast or splint gets worse  · Toes become swollen, cold, blue, numb, or tingly  · You can’t move your toes  · Skin around cast becomes red  · Fever of 101ºF (38.3ºC) or higher, or as directed by your health care provider  © 5345-2784 [employer]. [address] [place] [address]. All rights reserved. This information is not intended as a substitute for professional medical care. Always follow your healthcare professional's instructions.        Fiberglass Splint Care    Follow these guidelines when caring for your splint:  · It will take up to 2 hours for your fiberglass splint to fully harden. Don’t put any pressure on it during that time or it may break.  · To prevent swelling under the splint, do this for the first 2 days (48 hours):  ¨ For a splint on your arm, keep it in a sling or raised to shoulder level when you are  sitting or standing. Rest it on your chest or on a pillow at your side when you are lying down.  ¨ For a splint on your foot, keep it propped up above the level of your waist when sitting or lying. Avoid crutch walking as much as possible during this time.  · Keep the splint or cast dry at all times. Bathe with your splint or cast well out of the water. Protect it with a large plastic bag, rubber-banded at the top end. If a fiberglass cast or splint gets wet, you can dry it with a hair dryer.  · Put an ice pack on the injured area. Do this for 20 minutes every 1 to 2 hours the first day for pain relief. You can make an ice pack by wrapping a plastic bag of ice cubes in a thin towel. As the ice melts, be careful that the splint doesn’t get wet. Continue using the ice pack 3 to 4 times a day for the next 2 days. Then use the ice pack as needed to ease pain and swelling.  · Your health care provider may prescribe medicines for pain or swelling. Follow your provider’s instructions for taking these medicines. If no pain medicine was prescribed, you may use acetaminophen or ibuprofen to control pain. If you have chronic liver or kidney disease, talk with your health care provider before using these medicines. Also talk with your provider if you’ve had a stomach ulcer or GI bleeding.  Follow-up care  Follow up with your health care provider, or as advised.  When to seek medical advice  Call your health care provider right away if any of these occur:  · Bad odor from the splint or wound fluid stains the splint  · The splint cracks or stays wet for more than 24 hours  · Tightness or pressure under the splint gets worse  · Fingers or toes become swollen, cold, blue, numb, or tingly  · You can’t move your fingers or toes  · Pain under the splint gets worse  · The skin around the splint becomes red  · Fever of more than 101°F (38°C)  © 1798-0797 [employer]. [address] [place] [address]. All rights  reserved. This information is not intended as a substitute for professional medical care. Always follow your healthcare professional's instructions.         TKA (total knee arthroplasty)

## 2017-06-08 NOTE — DISCHARGE NOTE ADULT - CARE PROVIDERS DIRECT ADDRESSES
[email or web address]

## 2017-06-09 ENCOUNTER — TRANSCRIPTION ENCOUNTER (OUTPATIENT)
Age: 62
End: 2017-06-09

## 2017-06-09 LAB
ANION GAP SERPL CALC-SCNC: 10 MMOL/L — SIGNIFICANT CHANGE UP (ref 5–17)
BUN SERPL-MCNC: 14 MG/DL — SIGNIFICANT CHANGE UP (ref 8–20)
CALCIUM SERPL-MCNC: 8.4 MG/DL — LOW (ref 8.6–10.2)
CHLORIDE SERPL-SCNC: 102 MMOL/L — SIGNIFICANT CHANGE UP (ref 98–107)
CO2 SERPL-SCNC: 26 MMOL/L — SIGNIFICANT CHANGE UP (ref 22–29)
CREAT SERPL-MCNC: 0.78 MG/DL — SIGNIFICANT CHANGE UP (ref 0.5–1.3)
GLUCOSE SERPL-MCNC: 115 MG/DL — SIGNIFICANT CHANGE UP (ref 70–115)
HCT VFR BLD CALC: 33.8 % — LOW (ref 37–47)
HGB BLD-MCNC: 11.1 G/DL — LOW (ref 12–16)
MCHC RBC-ENTMCNC: 31.4 PG — HIGH (ref 27–31)
MCHC RBC-ENTMCNC: 32.8 G/DL — SIGNIFICANT CHANGE UP (ref 32–36)
MCV RBC AUTO: 95.5 FL — SIGNIFICANT CHANGE UP (ref 81–99)
PLATELET # BLD AUTO: 233 K/UL — SIGNIFICANT CHANGE UP (ref 150–400)
POTASSIUM SERPL-MCNC: 4.1 MMOL/L — SIGNIFICANT CHANGE UP (ref 3.5–5.3)
POTASSIUM SERPL-SCNC: 4.1 MMOL/L — SIGNIFICANT CHANGE UP (ref 3.5–5.3)
RBC # BLD: 3.54 M/UL — LOW (ref 4.4–5.2)
RBC # FLD: 14.4 % — SIGNIFICANT CHANGE UP (ref 11–15.6)
SODIUM SERPL-SCNC: 138 MMOL/L — SIGNIFICANT CHANGE UP (ref 135–145)
WBC # BLD: 9.6 K/UL — SIGNIFICANT CHANGE UP (ref 4.8–10.8)
WBC # FLD AUTO: 9.6 K/UL — SIGNIFICANT CHANGE UP (ref 4.8–10.8)

## 2017-06-09 PROCEDURE — 99232 SBSQ HOSP IP/OBS MODERATE 35: CPT

## 2017-06-09 PROCEDURE — 99222 1ST HOSP IP/OBS MODERATE 55: CPT | Mod: GC

## 2017-06-09 RX ORDER — SODIUM CHLORIDE 9 MG/ML
1000 INJECTION, SOLUTION INTRAVENOUS
Qty: 0 | Refills: 0 | Status: DISCONTINUED | OUTPATIENT
Start: 2017-06-09 | End: 2017-06-13

## 2017-06-09 RX ORDER — METOPROLOL TARTRATE 50 MG
25 TABLET ORAL ONCE
Qty: 0 | Refills: 0 | Status: COMPLETED | OUTPATIENT
Start: 2017-06-09 | End: 2017-06-09

## 2017-06-09 RX ADMIN — OXYCODONE HYDROCHLORIDE 10 MILLIGRAM(S): 5 TABLET ORAL at 08:11

## 2017-06-09 RX ADMIN — OXYCODONE HYDROCHLORIDE 10 MILLIGRAM(S): 5 TABLET ORAL at 10:52

## 2017-06-09 RX ADMIN — OXYCODONE HYDROCHLORIDE 10 MILLIGRAM(S): 5 TABLET ORAL at 11:20

## 2017-06-09 RX ADMIN — OXYCODONE HYDROCHLORIDE 10 MILLIGRAM(S): 5 TABLET ORAL at 19:58

## 2017-06-09 RX ADMIN — Medication 50 MILLIGRAM(S): at 18:00

## 2017-06-09 RX ADMIN — Medication 100 MILLIGRAM(S): at 05:01

## 2017-06-09 RX ADMIN — Medication 325 MILLIGRAM(S): at 13:12

## 2017-06-09 RX ADMIN — HYDROMORPHONE HYDROCHLORIDE 0.5 MILLIGRAM(S): 2 INJECTION INTRAMUSCULAR; INTRAVENOUS; SUBCUTANEOUS at 16:47

## 2017-06-09 RX ADMIN — FAMOTIDINE 20 MILLIGRAM(S): 10 INJECTION INTRAVENOUS at 05:01

## 2017-06-09 RX ADMIN — Medication 650 MILLIGRAM(S): at 18:01

## 2017-06-09 RX ADMIN — Medication 325 MILLIGRAM(S): at 18:00

## 2017-06-09 RX ADMIN — OXYCODONE HYDROCHLORIDE 10 MILLIGRAM(S): 5 TABLET ORAL at 14:00

## 2017-06-09 RX ADMIN — OXYCODONE HYDROCHLORIDE 10 MILLIGRAM(S): 5 TABLET ORAL at 19:00

## 2017-06-09 RX ADMIN — Medication 100 MILLIGRAM(S): at 13:12

## 2017-06-09 RX ADMIN — OXYCODONE HYDROCHLORIDE 10 MILLIGRAM(S): 5 TABLET ORAL at 18:58

## 2017-06-09 RX ADMIN — Medication 1 TABLET(S): at 16:34

## 2017-06-09 RX ADMIN — HYDROMORPHONE HYDROCHLORIDE 0.5 MILLIGRAM(S): 2 INJECTION INTRAMUSCULAR; INTRAVENOUS; SUBCUTANEOUS at 21:45

## 2017-06-09 RX ADMIN — Medication 1 MILLIGRAM(S): at 13:12

## 2017-06-09 RX ADMIN — OXYCODONE HYDROCHLORIDE 10 MILLIGRAM(S): 5 TABLET ORAL at 18:00

## 2017-06-09 RX ADMIN — HYDROMORPHONE HYDROCHLORIDE 0.5 MILLIGRAM(S): 2 INJECTION INTRAMUSCULAR; INTRAVENOUS; SUBCUTANEOUS at 22:16

## 2017-06-09 RX ADMIN — Medication 25 MILLIGRAM(S): at 10:35

## 2017-06-09 RX ADMIN — Medication 325 MILLIGRAM(S): at 09:01

## 2017-06-09 RX ADMIN — OXYCODONE HYDROCHLORIDE 10 MILLIGRAM(S): 5 TABLET ORAL at 03:59

## 2017-06-09 RX ADMIN — ENOXAPARIN SODIUM 30 MILLIGRAM(S): 100 INJECTION SUBCUTANEOUS at 05:00

## 2017-06-09 RX ADMIN — OXYCODONE HYDROCHLORIDE 10 MILLIGRAM(S): 5 TABLET ORAL at 00:59

## 2017-06-09 RX ADMIN — OXYCODONE HYDROCHLORIDE 10 MILLIGRAM(S): 5 TABLET ORAL at 05:45

## 2017-06-09 RX ADMIN — FAMOTIDINE 20 MILLIGRAM(S): 10 INJECTION INTRAVENOUS at 18:00

## 2017-06-09 RX ADMIN — HYDROMORPHONE HYDROCHLORIDE 0.5 MILLIGRAM(S): 2 INJECTION INTRAMUSCULAR; INTRAVENOUS; SUBCUTANEOUS at 17:01

## 2017-06-09 RX ADMIN — OXYCODONE HYDROCHLORIDE 10 MILLIGRAM(S): 5 TABLET ORAL at 03:11

## 2017-06-09 RX ADMIN — OXYCODONE HYDROCHLORIDE 10 MILLIGRAM(S): 5 TABLET ORAL at 15:34

## 2017-06-09 RX ADMIN — OXYCODONE HYDROCHLORIDE 10 MILLIGRAM(S): 5 TABLET ORAL at 05:01

## 2017-06-09 RX ADMIN — Medication 100 MILLIGRAM(S): at 23:24

## 2017-06-09 RX ADMIN — ENOXAPARIN SODIUM 30 MILLIGRAM(S): 100 INJECTION SUBCUTANEOUS at 18:00

## 2017-06-09 RX ADMIN — Medication 300 MILLIGRAM(S): at 13:12

## 2017-06-09 RX ADMIN — OXYCODONE HYDROCHLORIDE 10 MILLIGRAM(S): 5 TABLET ORAL at 00:14

## 2017-06-09 RX ADMIN — OXYCODONE HYDROCHLORIDE 10 MILLIGRAM(S): 5 TABLET ORAL at 07:41

## 2017-06-09 NOTE — PROGRESS NOTE ADULT - ASSESSMENT
60 yo female with arthritis bilateral knee worsening over years , s/p b/l TKA , POD # 1 , pain well controlled .    Problem/Recommendation - 1:  Problem: Primary osteoarthritis of both knees. Recommendation: s/p b/l TKA.    Problem/Recommendation - 2:  ·  Problem: Status post bilateral knee replacements.  Recommendation: PT/OT/pain mgmt  DVT prophylaxis- as per ortho  Abx as per SCIP  Incentive spirometry  Prophylaxis of opoid induced constipation.     Problem/Recommendation - 3:  ·  Problem: Essential hypertension.  Recommendation:  BP on lower side , continue ivf  ,continue home medications with parameters , hold Lasix till am.    Problem/Recommendation - 4:  ·  Problem: Thyroid nodule.  Recommendation: out patient  follow up.     Problem/Recommendation - 5:  ·  Problem: Prophylactic measure.  Recommendation: DVT prophylaxis - as per ortho.     Problem/Recommendation - 6:  Problem: Chronic gout of left foot, unspecified cause. Recommendation: continue Allopurinol.    Problem / Plan : - 7: ABLA- asymptomatic , follow CBC in am 62 yo female with arthritis bilateral knee worsening over years , s/p b/l TKA , POD # 1 , pain well controlled .    Problem/Recommendation - 1:  Problem: Primary osteoarthritis of both knees. Recommendation: s/p b/l TKA.    Problem/Recommendation - 2:  ·  Problem: Status post bilateral knee replacements.  Recommendation: PT/OT/pain mgmt  DVT prophylaxis- as per ortho  Abx as per SCIP  Incentive spirometry  Prophylaxis of opoid induced constipation.     Problem/Recommendation - 3:  ·  Problem: Essential hypertension.  Recommendation:  BP on lower side , continue ivf  ,continue home medications with parameters , hold Lasix till am.    Problem/Recommendation - 4:  ·  Problem: Thyroid nodule.  Recommendation: out patient  follow up.     Problem/Recommendation - 5:  ·  Problem: Prophylactic measure.  Recommendation: DVT prophylaxis - as per ortho.     Problem/Recommendation - 6:  Problem: Chronic gout of left foot, unspecified cause. Recommendation: continue Allopurinol.    Problem / Plan : - 7: ABLA- asymptomatic , follow CBC in am     Problem / Plan : - 8: Low grade fever - no signs or symptoms of infection , likely post op , will observe .

## 2017-06-09 NOTE — CONSULT NOTE ADULT - SUBJECTIVE AND OBJECTIVE BOX
Patient is a 61y old  Female who presents for rehab evaluation s/p bilateral TKA    HPI:  60 yo female with years of progressively worsening arthritis of bilateral knees which failed outpatient conservative management, admitted 6/8 for elective bilateral TKA. Underwent procedure same day of admission by Dr. Harrell.    REVIEW OF SYSTEMS  Constitutional - No fever, No weight loss, No fatigue  HEENT - No eye pain, No visual disturbances, No difficulty hearing, No tinnitus, No vertigo, No neck pain  Respiratory - No cough, No wheezing, No shortness of breath  Cardiovascular - No chest pain, No palpitations  Gastrointestinal - No abdominal pain, No nausea, No vomiting, No diarrhea, No constipation  Genitourinary - No dysuria, No frequency, No hematuria, No incontinence  Neurological - No headaches, No memory loss, No loss of strength, No numbness, No tremors  Skin - No itching, No rashes, No lesions   Endocrine - No temperature intolerance  Musculoskeletal - + joint pain, + joint swelling, + muscle pain  Psychiatric - No depression, No anxiety    PAST MEDICAL & SURGICAL HISTORY  Thyroid nodule  Hypertension  S/P hysterectomy  S/P carpal tunnel release  S/P thyroid surgery  S/P hernia repair  S/P shoulder surgery  S/P lumbar fusion  S/P cholecystectomy      SOCIAL HISTORY  Smoking - Denied  EtOH - Denied   Drugs - Denied    FUNCTIONAL HISTORY PT Martinez 6/8  Lives in a private home with her , 3 steps to enter with handrails, 13 steps inside with handrails to bedrooms. Pt was independent PTA without assist device.       CURRENT FUNCTIONAL STATUS  Supervision- Mod A bed mobility  Min A transfers  Mod A RW 75ft    FAMILY HISTORY   Family history of coronary artery disease (Mother)  Family history of lung cancer (Father)      RECENT LABS/IMAGING  CBC Full  -  ( 09 Jun 2017 05:44 )  WBC Count : 9.6 K/uL  Hemoglobin : 11.1 g/dL  Hematocrit : 33.8 %  Platelet Count - Automated : 233 K/uL  Mean Cell Volume : 95.5 fl  Mean Cell Hemoglobin : 31.4 pg  Mean Cell Hemoglobin Concentration : 32.8 g/dL    06-09    138  |  102  |  14.0  ----------------------------<  115  4.1   |  26.0  |  0.78    Ca    8.4<L>      09 Jun 2017 05:44      ALLERGIES  No Known Allergies      MEDICATIONS   vancomycin  IVPB 1500milliGRAM(s) IV Intermittent once  acetaminophen   Tablet 650milliGRAM(s) Oral every 6 hours PRN  oxyCODONE IR 5milliGRAM(s) Oral every 3 hours PRN  oxyCODONE IR 10milliGRAM(s) Oral every 3 hours PRN  HYDROmorphone  Injectable 0.5milliGRAM(s) IV Push every 4 hours PRN  aluminum hydroxide/magnesium hydroxide/simethicone Suspension 30milliLiter(s) Oral four times a day PRN  ondansetron Injectable 4milliGRAM(s) IV Push every 6 hours PRN  magnesium hydroxide Suspension 30milliLiter(s) Oral daily PRN  senna 2Tablet(s) Oral at bedtime PRN  docusate sodium 100milliGRAM(s) Oral three times a day  ferrous    sulfate 325milliGRAM(s) Oral three times a day with meals  folic acid 1milliGRAM(s) Oral daily  multivitamin 1Tablet(s) Oral daily  oxyCODONE ER Tablet 10milliGRAM(s) Oral every 12 hours  valsartan 80milliGRAM(s) Oral daily  allopurinol 300milliGRAM(s) Oral daily  famotidine    Tablet 20milliGRAM(s) Oral two times a day  furosemide    Tablet 20milliGRAM(s) Oral daily  metoprolol 100milliGRAM(s) Oral <User Schedule>  metoprolol 50milliGRAM(s) Oral <User Schedule>  enoxaparin Injectable 30milliGRAM(s) SubCutaneous two times a day  lactated ringers. 1000milliLiter(s) IV Continuous <Continuous>    VITALS  T(C): 38.1, Max: 38.1 (06-09 @ 08:00)  HR: 94 (63 - 94)  BP: 111/66 (93/45 - 111/66)  RR: 16 (15 - 19)  SpO2: 99% (97% - 100%) Patient is a 61y old  Female who presents for rehab evaluation s/p bilateral TKA    HPI:  60 yo female with years of progressively worsening arthritis of bilateral knees which failed outpatient conservative management, admitted 6/8 for elective bilateral TKA. Underwent procedure same day of admission by Dr. Harrell. Asked to see patient for rehab recommendations.    REVIEW OF SYSTEMS  Constitutional - No fever, No weight loss, No fatigue  HEENT - No eye pain, No visual disturbances, No difficulty hearing, No tinnitus, No vertigo, No neck pain  Respiratory - No cough, No wheezing, No shortness of breath  Cardiovascular - No chest pain, No palpitations  Gastrointestinal - No abdominal pain, No nausea, No vomiting, No diarrhea, No constipation  Genitourinary - No dysuria, No frequency, No hematuria, No incontinence  Neurological - No headaches, No memory loss, No loss of strength, No numbness, No tremors  Skin - No itching, No rashes, No lesions   Endocrine - No temperature intolerance  Musculoskeletal - + joint pain, + joint swelling, + muscle pain  Psychiatric - No depression, No anxiety    PAST MEDICAL & SURGICAL HISTORY  Thyroid nodule  Hypertension  S/P hysterectomy  S/P carpal tunnel release  S/P thyroid surgery  S/P hernia repair  S/P shoulder surgery  S/P lumbar fusion  S/P cholecystectomy      SOCIAL HISTORY  Smoking - Denied  EtOH - Denied   Drugs - Denied    FUNCTIONAL HISTORY PT Eval 6/8  Lives in a private home with her , 3 steps to enter with handrails, 13 steps inside with handrails to bedrooms. Pt was independent PTA without assist device.       CURRENT FUNCTIONAL STATUS PT Eval 6/9  Therapeutic Interventions    Sit-Stand Transfer Training  Transfer Training Sit-to-Stand Transfer : 1 person assist;  weight-bearing as tolerated   bilateral LE    moderate assist (50% patient effort);  rolling walker  Sit-Stand Transfer Training  Transfer Training Stand-to-Sit Transfer : moderate assist (50% patient effort);  1 person assist;  weight-bearing as tolerated   bilateral LE    rolling walker  Sit-Stand Transfer Training  Sit-to-Stand Transfer Training Transfer Safety Analysis : decreased ROM;  decreased strength;  pain  Gait Training   Gait Training : minimum assist (75% patient effort);  1 person assist;  weight-bearing as tolerated   bilateral LE    rolling walker;  50 feet      FAMILY HISTORY   Family history of coronary artery disease (Mother)  Family history of lung cancer (Father)      RECENT LABS/IMAGING  CBC Full  -  ( 09 Jun 2017 05:44 )  WBC Count : 9.6 K/uL  Hemoglobin : 11.1 g/dL  Hematocrit : 33.8 %  Platelet Count - Automated : 233 K/uL  Mean Cell Volume : 95.5 fl  Mean Cell Hemoglobin : 31.4 pg  Mean Cell Hemoglobin Concentration : 32.8 g/dL    06-09    138  |  102  |  14.0  ----------------------------<  115  4.1   |  26.0  |  0.78    Ca    8.4<L>      09 Jun 2017 05:44      ALLERGIES  No Known Allergies      MEDICATIONS   vancomycin  IVPB 1500milliGRAM(s) IV Intermittent once  acetaminophen   Tablet 650milliGRAM(s) Oral every 6 hours PRN  oxyCODONE IR 5milliGRAM(s) Oral every 3 hours PRN  oxyCODONE IR 10milliGRAM(s) Oral every 3 hours PRN  HYDROmorphone  Injectable 0.5milliGRAM(s) IV Push every 4 hours PRN  aluminum hydroxide/magnesium hydroxide/simethicone Suspension 30milliLiter(s) Oral four times a day PRN  ondansetron Injectable 4milliGRAM(s) IV Push every 6 hours PRN  magnesium hydroxide Suspension 30milliLiter(s) Oral daily PRN  senna 2Tablet(s) Oral at bedtime PRN  docusate sodium 100milliGRAM(s) Oral three times a day  ferrous    sulfate 325milliGRAM(s) Oral three times a day with meals  folic acid 1milliGRAM(s) Oral daily  multivitamin 1Tablet(s) Oral daily  oxyCODONE ER Tablet 10milliGRAM(s) Oral every 12 hours  valsartan 80milliGRAM(s) Oral daily  allopurinol 300milliGRAM(s) Oral daily  famotidine    Tablet 20milliGRAM(s) Oral two times a day  furosemide    Tablet 20milliGRAM(s) Oral daily  metoprolol 100milliGRAM(s) Oral <User Schedule>  metoprolol 50milliGRAM(s) Oral <User Schedule>  enoxaparin Injectable 30milliGRAM(s) SubCutaneous two times a day  lactated ringers. 1000milliLiter(s) IV Continuous <Continuous>    VITALS  T(C): 38.1, Max: 38.1 (06-09 @ 08:00)  HR: 94 (63 - 94)  BP: 111/66 (93/45 - 111/66)  RR: 16 (15 - 19)  SpO2: 99% (97% - 100%)    ----------------------------------------------------------------------------------------  PHYSICAL EXAM  Constitutional - moderate painful distress  HEENT - NCAT, EOMI  Neck - Supple, No limited ROM  Chest - CTA bilaterally, No wheeze, No rhonchi, No crackles  Cardiovascular - RRR, S1S2, No murmurs  Abdomen - BS+, Soft, NTND  Extremities - +ACE wraps b/l LE, C/D/I  Neurologic Exam -                    Cognitive - Awake, Alert, AAO to self, place, date, year, situation     Communication - Fluent, No dysarthria     Motor - Moves all extremities.  5/5 strength b/l UE. LE MMT limited by pain. At least 2/5 strength proximal lower extremities and 4/5 strength distal LE.     Sensory - Intact to LT      Psychiatric - anxious about pain level  ----------------------------------------------------------------------------------------  ASSESSMENT/PLAN    60 yo female s/p b/l TKA now with pain, gait dysfunction and functional deficits.    DVT PPx- Lovenox  Pain- Dilaudid IV prn, Oxycodone prn, Tylenol prn. Recommend Pain management consult due to patient severe pain which is causing her anxiety and will potentially be a setback for her functional recovery.  Precautions- Fall    - Recommend ACUTE inpatient rehabilitation for the functional deficits consisting of 3 hours of therapy/day & 24 hour RN/daily PMR physician for comorbid medical management. Will continue to follow for ongoing rehab needs and recommendations.  Patient can tolerate 3 hours combined daily therapy. Patient is a 61y old  Female who presents for rehab evaluation s/p bilateral TKA    HPI:  60 yo female with years of progressively worsening arthritis of bilateral knees which failed outpatient conservative management, admitted 6/8 for elective bilateral TKA. Underwent procedure same day of admission by Dr. Harrell. Asked to see patient for rehab recommendations.    REVIEW OF SYSTEMS  Constitutional - No fever,  HEENT - No eye pain,   Respiratory - No cough,   Cardiovascular - No chest pain, No palpitations  Gastrointestinal - No abdominal pain,   Genitourinary - No dysuria,   Neurological - No headaches,  Skin - No itching, No rashes, No lesions   Musculoskeletal - + joint pain, + joint swelling, + muscle pain  Psychiatric - No depression, No anxiety    PAST MEDICAL & SURGICAL HISTORY  Thyroid nodule  Hypertension  S/P hysterectomy  S/P carpal tunnel release  S/P thyroid surgery  S/P hernia repair  S/P shoulder surgery  S/P lumbar fusion  S/P cholecystectomy      SOCIAL HISTORY  Smoking - Denied  EtOH - Denied   Drugs - Denied    FUNCTIONAL HISTORY PT [facility] 6/8  Lives in a private home with her , 3 steps to enter with handrails, 13 steps inside with handrails to bedrooms. Pt was independent PTA without assist device.       CURRENT FUNCTIONAL STATUS PT Eval 6/9  Therapeutic Interventions    Sit-Stand Transfer Training  Transfer Training Sit-to-Stand Transfer : 1 person assist;  weight-bearing as tolerated   bilateral LE    moderate assist (50% patient effort);  rolling walker  Sit-Stand Transfer Training  Transfer Training Stand-to-Sit Transfer : moderate assist (50% patient effort);  1 person assist;  weight-bearing as tolerated   bilateral LE    rolling walker  Sit-Stand Transfer Training  Sit-to-Stand Transfer Training Transfer Safety Analysis : decreased ROM;  decreased strength;  pain  Gait Training   Gait Training : minimum assist (75% patient effort);  1 person assist;  weight-bearing as tolerated   bilateral LE    rolling walker;  50 feet      FAMILY HISTORY   Family history of coronary artery disease (Mother)  Family history of lung cancer (Father)      RECENT LABS/IMAGING  CBC Full  -  ( 09 Jun 2017 05:44 )  WBC Count : 9.6 K/uL  Hemoglobin : 11.1 g/dL  Hematocrit : 33.8 %  Platelet Count - Automated : 233 K/uL  Mean Cell Volume : 95.5 fl  Mean Cell Hemoglobin : 31.4 pg  Mean Cell Hemoglobin Concentration : 32.8 g/dL    06-09    138  |  102  |  14.0  ----------------------------<  115  4.1   |  26.0  |  0.78    Ca    8.4<L>      09 Jun 2017 05:44      ALLERGIES  No Known Allergies      MEDICATIONS   vancomycin  IVPB 1500milliGRAM(s) IV Intermittent once  acetaminophen   Tablet 650milliGRAM(s) Oral every 6 hours PRN  oxyCODONE IR 5milliGRAM(s) Oral every 3 hours PRN  oxyCODONE IR 10milliGRAM(s) Oral every 3 hours PRN  HYDROmorphone  Injectable 0.5milliGRAM(s) IV Push every 4 hours PRN  aluminum hydroxide/magnesium hydroxide/simethicone Suspension 30milliLiter(s) Oral four times a day PRN  ondansetron Injectable 4milliGRAM(s) IV Push every 6 hours PRN  magnesium hydroxide Suspension 30milliLiter(s) Oral daily PRN  senna 2Tablet(s) Oral at bedtime PRN  docusate sodium 100milliGRAM(s) Oral three times a day  ferrous    sulfate 325milliGRAM(s) Oral three times a day with meals  folic acid 1milliGRAM(s) Oral daily  multivitamin 1Tablet(s) Oral daily  oxyCODONE ER Tablet 10milliGRAM(s) Oral every 12 hours  valsartan 80milliGRAM(s) Oral daily  allopurinol 300milliGRAM(s) Oral daily  famotidine    Tablet 20milliGRAM(s) Oral two times a day  furosemide    Tablet 20milliGRAM(s) Oral daily  metoprolol 100milliGRAM(s) Oral <User Schedule>  metoprolol 50milliGRAM(s) Oral <User Schedule>  enoxaparin Injectable 30milliGRAM(s) SubCutaneous two times a day  lactated ringers. 1000milliLiter(s) IV Continuous <Continuous>    VITALS  T(C): 38.1, Max: 38.1 (06-09 @ 08:00)  HR: 94 (63 - 94)  BP: 111/66 (93/45 - 111/66)  RR: 16 (15 - 19)  SpO2: 99% (97% - 100%)    ----------------------------------------------------------------------------------------  PHYSICAL EXAM  Constitutional - in pain, appears uncomfortable.  Chest - CTA bilaterally,  Cardiovascular - RRR, S1S2,   Abdomen - BS+, Soft, NTND  Extremities - +ACE wraps b/l LE, C/D/I  Neurologic Exam -                    Cognitive - Awake, Alert, AAO to self, place, date, year, situation     Communication - Fluent, No dysarthria     Motor - Moves all extremities.  5/5 strength b/l UE. LE MMT limited by pain. At least 2/5 strength proximal lower extremities and 4/5 strength distal LE.     Sensory - Intact to LT      Psychiatric - anxious about pain level  ----------------------------------------------------------------------------------------  ASSESSMENT/PLAN    60 yo female s/p b/l TKA now with pain, gait dysfunction and functional deficits.    DVT PPx- Lovenox  Pain- Dilaudid IV prn, Oxycodone prn, Tylenol prn.   Precautions- Fall    - Recommend ACUTE inpatient rehabilitation for the functional deficits consisting of 3 hours of therapy/day & 24 hour RN/daily PMR physician for comorbid medical management. Will continue to follow for ongoing rehab needs and recommendations.  Patient can tolerate 3 hours combined daily therapy.

## 2017-06-09 NOTE — CONSULT NOTE ADULT - ATTENDING COMMENTS
61 year old female with h/o OA bilateral knees that failed conservative treatment, also with left hip bursitis, underwent TKA bilateral. Tolerated procedure.   POD 1. Patient appears frustrated.   Recommend acute rehab when medically stable. She will be able to tolerate acute rehab program.  Thank you. d/w  at bedside.
Thank you for the consult , will follow .

## 2017-06-10 DIAGNOSIS — R50.82 POSTPROCEDURAL FEVER: ICD-10-CM

## 2017-06-10 LAB
ANION GAP SERPL CALC-SCNC: 10 MMOL/L — SIGNIFICANT CHANGE UP (ref 5–17)
BUN SERPL-MCNC: 8 MG/DL — SIGNIFICANT CHANGE UP (ref 8–20)
CALCIUM SERPL-MCNC: 9.6 MG/DL — SIGNIFICANT CHANGE UP (ref 8.6–10.2)
CHLORIDE SERPL-SCNC: 101 MMOL/L — SIGNIFICANT CHANGE UP (ref 98–107)
CO2 SERPL-SCNC: 27 MMOL/L — SIGNIFICANT CHANGE UP (ref 22–29)
CREAT SERPL-MCNC: 0.6 MG/DL — SIGNIFICANT CHANGE UP (ref 0.5–1.3)
GLUCOSE SERPL-MCNC: 135 MG/DL — HIGH (ref 70–115)
HCT VFR BLD CALC: 32.6 % — LOW (ref 37–47)
HGB BLD-MCNC: 11 G/DL — LOW (ref 12–16)
MCHC RBC-ENTMCNC: 31.3 PG — HIGH (ref 27–31)
MCHC RBC-ENTMCNC: 33.7 G/DL — SIGNIFICANT CHANGE UP (ref 32–36)
MCV RBC AUTO: 92.9 FL — SIGNIFICANT CHANGE UP (ref 81–99)
PLATELET # BLD AUTO: 244 K/UL — SIGNIFICANT CHANGE UP (ref 150–400)
POTASSIUM SERPL-MCNC: 4.4 MMOL/L — SIGNIFICANT CHANGE UP (ref 3.5–5.3)
POTASSIUM SERPL-SCNC: 4.4 MMOL/L — SIGNIFICANT CHANGE UP (ref 3.5–5.3)
RBC # BLD: 3.51 M/UL — LOW (ref 4.4–5.2)
RBC # FLD: 14.5 % — SIGNIFICANT CHANGE UP (ref 11–15.6)
SODIUM SERPL-SCNC: 138 MMOL/L — SIGNIFICANT CHANGE UP (ref 135–145)
WBC # BLD: 10 K/UL — SIGNIFICANT CHANGE UP (ref 4.8–10.8)
WBC # FLD AUTO: 10 K/UL — SIGNIFICANT CHANGE UP (ref 4.8–10.8)

## 2017-06-10 PROCEDURE — 99233 SBSQ HOSP IP/OBS HIGH 50: CPT

## 2017-06-10 RX ORDER — ENOXAPARIN SODIUM 100 MG/ML
30 INJECTION SUBCUTANEOUS
Qty: 0 | Refills: 0 | DISCHARGE
Start: 2017-06-10 | End: 2017-06-24

## 2017-06-10 RX ORDER — CELECOXIB 200 MG/1
200 CAPSULE ORAL
Qty: 0 | Refills: 0 | Status: DISCONTINUED | OUTPATIENT
Start: 2017-06-10 | End: 2017-06-13

## 2017-06-10 RX ORDER — ENOXAPARIN SODIUM 100 MG/ML
30 INJECTION SUBCUTANEOUS
Qty: 0 | Refills: 0 | COMMUNITY
Start: 2017-06-10 | End: 2017-06-24

## 2017-06-10 RX ORDER — OXYCODONE HYDROCHLORIDE 5 MG/1
20 TABLET ORAL EVERY 12 HOURS
Qty: 0 | Refills: 0 | Status: DISCONTINUED | OUTPATIENT
Start: 2017-06-10 | End: 2017-06-13

## 2017-06-10 RX ADMIN — Medication 100 MILLIGRAM(S): at 06:26

## 2017-06-10 RX ADMIN — ENOXAPARIN SODIUM 30 MILLIGRAM(S): 100 INJECTION SUBCUTANEOUS at 16:38

## 2017-06-10 RX ADMIN — Medication 325 MILLIGRAM(S): at 11:59

## 2017-06-10 RX ADMIN — OXYCODONE HYDROCHLORIDE 10 MILLIGRAM(S): 5 TABLET ORAL at 08:49

## 2017-06-10 RX ADMIN — Medication 100 MILLIGRAM(S): at 11:59

## 2017-06-10 RX ADMIN — OXYCODONE HYDROCHLORIDE 10 MILLIGRAM(S): 5 TABLET ORAL at 04:29

## 2017-06-10 RX ADMIN — OXYCODONE HYDROCHLORIDE 10 MILLIGRAM(S): 5 TABLET ORAL at 12:10

## 2017-06-10 RX ADMIN — HYDROMORPHONE HYDROCHLORIDE 0.5 MILLIGRAM(S): 2 INJECTION INTRAMUSCULAR; INTRAVENOUS; SUBCUTANEOUS at 06:55

## 2017-06-10 RX ADMIN — HYDROMORPHONE HYDROCHLORIDE 0.5 MILLIGRAM(S): 2 INJECTION INTRAMUSCULAR; INTRAVENOUS; SUBCUTANEOUS at 06:40

## 2017-06-10 RX ADMIN — Medication 1 MILLIGRAM(S): at 11:59

## 2017-06-10 RX ADMIN — OXYCODONE HYDROCHLORIDE 10 MILLIGRAM(S): 5 TABLET ORAL at 16:46

## 2017-06-10 RX ADMIN — ENOXAPARIN SODIUM 30 MILLIGRAM(S): 100 INJECTION SUBCUTANEOUS at 06:19

## 2017-06-10 RX ADMIN — OXYCODONE HYDROCHLORIDE 10 MILLIGRAM(S): 5 TABLET ORAL at 08:45

## 2017-06-10 RX ADMIN — OXYCODONE HYDROCHLORIDE 10 MILLIGRAM(S): 5 TABLET ORAL at 00:16

## 2017-06-10 RX ADMIN — Medication 100 MILLIGRAM(S): at 21:46

## 2017-06-10 RX ADMIN — HYDROMORPHONE HYDROCHLORIDE 0.5 MILLIGRAM(S): 2 INJECTION INTRAMUSCULAR; INTRAVENOUS; SUBCUTANEOUS at 23:47

## 2017-06-10 RX ADMIN — OXYCODONE HYDROCHLORIDE 10 MILLIGRAM(S): 5 TABLET ORAL at 21:46

## 2017-06-10 RX ADMIN — OXYCODONE HYDROCHLORIDE 10 MILLIGRAM(S): 5 TABLET ORAL at 05:29

## 2017-06-10 RX ADMIN — OXYCODONE HYDROCHLORIDE 10 MILLIGRAM(S): 5 TABLET ORAL at 22:46

## 2017-06-10 RX ADMIN — OXYCODONE HYDROCHLORIDE 10 MILLIGRAM(S): 5 TABLET ORAL at 12:08

## 2017-06-10 RX ADMIN — Medication 650 MILLIGRAM(S): at 12:00

## 2017-06-10 RX ADMIN — Medication 325 MILLIGRAM(S): at 08:39

## 2017-06-10 RX ADMIN — FAMOTIDINE 20 MILLIGRAM(S): 10 INJECTION INTRAVENOUS at 06:10

## 2017-06-10 RX ADMIN — OXYCODONE HYDROCHLORIDE 10 MILLIGRAM(S): 5 TABLET ORAL at 01:16

## 2017-06-10 RX ADMIN — Medication 325 MILLIGRAM(S): at 16:38

## 2017-06-10 RX ADMIN — Medication 100 MILLIGRAM(S): at 06:10

## 2017-06-10 RX ADMIN — VALSARTAN 80 MILLIGRAM(S): 80 TABLET ORAL at 06:10

## 2017-06-10 RX ADMIN — OXYCODONE HYDROCHLORIDE 10 MILLIGRAM(S): 5 TABLET ORAL at 07:01

## 2017-06-10 RX ADMIN — Medication 300 MILLIGRAM(S): at 11:59

## 2017-06-10 RX ADMIN — CELECOXIB 200 MILLIGRAM(S): 200 CAPSULE ORAL at 16:39

## 2017-06-10 RX ADMIN — Medication 1 TABLET(S): at 12:00

## 2017-06-10 RX ADMIN — FAMOTIDINE 20 MILLIGRAM(S): 10 INJECTION INTRAVENOUS at 16:39

## 2017-06-10 RX ADMIN — OXYCODONE HYDROCHLORIDE 10 MILLIGRAM(S): 5 TABLET ORAL at 16:42

## 2017-06-10 RX ADMIN — CELECOXIB 200 MILLIGRAM(S): 200 CAPSULE ORAL at 17:39

## 2017-06-10 RX ADMIN — OXYCODONE HYDROCHLORIDE 10 MILLIGRAM(S): 5 TABLET ORAL at 06:23

## 2017-06-10 RX ADMIN — Medication 50 MILLIGRAM(S): at 16:38

## 2017-06-10 RX ADMIN — OXYCODONE HYDROCHLORIDE 20 MILLIGRAM(S): 5 TABLET ORAL at 16:39

## 2017-06-10 RX ADMIN — OXYCODONE HYDROCHLORIDE 20 MILLIGRAM(S): 5 TABLET ORAL at 16:45

## 2017-06-10 NOTE — PROGRESS NOTE ADULT - ASSESSMENT
62 yo female with arthritis bilateral knee worsening over years , s/p b/l TKA , pain not well controlled , tolerating PT     Problem/Recommendation - 1:  Problem: Primary osteoarthritis of both knees. Recommendation: s/p b/l TKA.    Problem/Recommendation - 2:  ·  Problem: Status post bilateral knee replacements.  Recommendation: PT/OT/pain mgmt  DVT prophylaxis- as per ortho  Abx as per SCIP  Incentive spirometry  Prophylaxis of opoid induced constipation.     Problem/Recommendation - 3:  ·  Problem: Essential hypertension.  Recommendation:  BP on lower side , continue ivf  ,continue home medications with parameters , hold Lasix till am.    Problem/Recommendation - 4:  ·  Problem: Thyroid nodule.  Recommendation: out patient  follow up.     Problem/Recommendation - 5:  ·  Problem: Prophylactic measure.  Recommendation: DVT prophylaxis - as per ortho.     Problem/Recommendation - 6:  Problem: Chronic gout of left foot, unspecified cause. Recommendation: continue Allopurinol.    Problem / Plan : - 7: ABLA- asymptomatic , follow CBC in am     Problem / Plan : - 8: Low grade fever - no signs or symptoms of infection , likely post op , will observe . 60 yo female with arthritis bilateral knee worsening over years , s/p b/l TKA , pain not well controlled , tolerating PT

## 2017-06-11 DIAGNOSIS — D62 ACUTE POSTHEMORRHAGIC ANEMIA: ICD-10-CM

## 2017-06-11 LAB
ANION GAP SERPL CALC-SCNC: 11 MMOL/L — SIGNIFICANT CHANGE UP (ref 5–17)
BUN SERPL-MCNC: 12 MG/DL — SIGNIFICANT CHANGE UP (ref 8–20)
CALCIUM SERPL-MCNC: 9.7 MG/DL — SIGNIFICANT CHANGE UP (ref 8.6–10.2)
CHLORIDE SERPL-SCNC: 98 MMOL/L — SIGNIFICANT CHANGE UP (ref 98–107)
CO2 SERPL-SCNC: 27 MMOL/L — SIGNIFICANT CHANGE UP (ref 22–29)
CREAT SERPL-MCNC: 0.64 MG/DL — SIGNIFICANT CHANGE UP (ref 0.5–1.3)
GLUCOSE SERPL-MCNC: 139 MG/DL — HIGH (ref 70–115)
HCT VFR BLD CALC: 29.7 % — LOW (ref 37–47)
HGB BLD-MCNC: 9.8 G/DL — LOW (ref 12–16)
MCHC RBC-ENTMCNC: 30.6 PG — SIGNIFICANT CHANGE UP (ref 27–31)
MCHC RBC-ENTMCNC: 33 G/DL — SIGNIFICANT CHANGE UP (ref 32–36)
MCV RBC AUTO: 92.8 FL — SIGNIFICANT CHANGE UP (ref 81–99)
PLATELET # BLD AUTO: 239 K/UL — SIGNIFICANT CHANGE UP (ref 150–400)
POTASSIUM SERPL-MCNC: 4.2 MMOL/L — SIGNIFICANT CHANGE UP (ref 3.5–5.3)
POTASSIUM SERPL-SCNC: 4.2 MMOL/L — SIGNIFICANT CHANGE UP (ref 3.5–5.3)
RBC # BLD: 3.2 M/UL — LOW (ref 4.4–5.2)
RBC # FLD: 14.2 % — SIGNIFICANT CHANGE UP (ref 11–15.6)
SODIUM SERPL-SCNC: 136 MMOL/L — SIGNIFICANT CHANGE UP (ref 135–145)
WBC # BLD: 8.4 K/UL — SIGNIFICANT CHANGE UP (ref 4.8–10.8)
WBC # FLD AUTO: 8.4 K/UL — SIGNIFICANT CHANGE UP (ref 4.8–10.8)

## 2017-06-11 PROCEDURE — 99233 SBSQ HOSP IP/OBS HIGH 50: CPT

## 2017-06-11 RX ORDER — OXYCODONE HYDROCHLORIDE 5 MG/1
1 TABLET ORAL
Qty: 60 | Refills: 0 | OUTPATIENT
Start: 2017-06-11

## 2017-06-11 RX ORDER — MELOXICAM 15 MG/1
1 TABLET ORAL
Qty: 0 | Refills: 0 | COMMUNITY

## 2017-06-11 RX ORDER — SODIUM CHLORIDE 9 MG/ML
1000 INJECTION INTRAMUSCULAR; INTRAVENOUS; SUBCUTANEOUS
Qty: 0 | Refills: 0 | Status: DISCONTINUED | OUTPATIENT
Start: 2017-06-11 | End: 2017-06-12

## 2017-06-11 RX ADMIN — OXYCODONE HYDROCHLORIDE 20 MILLIGRAM(S): 5 TABLET ORAL at 17:10

## 2017-06-11 RX ADMIN — Medication 100 MILLIGRAM(S): at 12:48

## 2017-06-11 RX ADMIN — Medication 100 MILLIGRAM(S): at 05:29

## 2017-06-11 RX ADMIN — OXYCODONE HYDROCHLORIDE 10 MILLIGRAM(S): 5 TABLET ORAL at 14:50

## 2017-06-11 RX ADMIN — CELECOXIB 200 MILLIGRAM(S): 200 CAPSULE ORAL at 07:40

## 2017-06-11 RX ADMIN — HYDROMORPHONE HYDROCHLORIDE 0.5 MILLIGRAM(S): 2 INJECTION INTRAMUSCULAR; INTRAVENOUS; SUBCUTANEOUS at 00:02

## 2017-06-11 RX ADMIN — OXYCODONE HYDROCHLORIDE 10 MILLIGRAM(S): 5 TABLET ORAL at 20:02

## 2017-06-11 RX ADMIN — OXYCODONE HYDROCHLORIDE 20 MILLIGRAM(S): 5 TABLET ORAL at 17:09

## 2017-06-11 RX ADMIN — Medication 325 MILLIGRAM(S): at 08:12

## 2017-06-11 RX ADMIN — OXYCODONE HYDROCHLORIDE 10 MILLIGRAM(S): 5 TABLET ORAL at 21:00

## 2017-06-11 RX ADMIN — OXYCODONE HYDROCHLORIDE 10 MILLIGRAM(S): 5 TABLET ORAL at 10:39

## 2017-06-11 RX ADMIN — CELECOXIB 200 MILLIGRAM(S): 200 CAPSULE ORAL at 16:54

## 2017-06-11 RX ADMIN — ENOXAPARIN SODIUM 30 MILLIGRAM(S): 100 INJECTION SUBCUTANEOUS at 16:55

## 2017-06-11 RX ADMIN — ENOXAPARIN SODIUM 30 MILLIGRAM(S): 100 INJECTION SUBCUTANEOUS at 05:28

## 2017-06-11 RX ADMIN — Medication 325 MILLIGRAM(S): at 12:50

## 2017-06-11 RX ADMIN — OXYCODONE HYDROCHLORIDE 10 MILLIGRAM(S): 5 TABLET ORAL at 14:56

## 2017-06-11 RX ADMIN — Medication 50 MILLIGRAM(S): at 16:54

## 2017-06-11 RX ADMIN — Medication 325 MILLIGRAM(S): at 16:55

## 2017-06-11 RX ADMIN — OXYCODONE HYDROCHLORIDE 10 MILLIGRAM(S): 5 TABLET ORAL at 10:41

## 2017-06-11 RX ADMIN — OXYCODONE HYDROCHLORIDE 20 MILLIGRAM(S): 5 TABLET ORAL at 05:29

## 2017-06-11 RX ADMIN — Medication 100 MILLIGRAM(S): at 21:58

## 2017-06-11 RX ADMIN — Medication 300 MILLIGRAM(S): at 12:48

## 2017-06-11 RX ADMIN — FAMOTIDINE 20 MILLIGRAM(S): 10 INJECTION INTRAVENOUS at 05:29

## 2017-06-11 RX ADMIN — Medication 1 MILLIGRAM(S): at 12:49

## 2017-06-11 RX ADMIN — Medication 1 TABLET(S): at 12:49

## 2017-06-11 RX ADMIN — CELECOXIB 200 MILLIGRAM(S): 200 CAPSULE ORAL at 06:43

## 2017-06-11 RX ADMIN — OXYCODONE HYDROCHLORIDE 20 MILLIGRAM(S): 5 TABLET ORAL at 06:29

## 2017-06-11 RX ADMIN — OXYCODONE HYDROCHLORIDE 10 MILLIGRAM(S): 5 TABLET ORAL at 23:38

## 2017-06-11 NOTE — PROGRESS NOTE ADULT - ASSESSMENT
62 yo female with arthritis bilateral knee worsening over years , s/p b/l TKA , pain not well controlled , tolerating PT

## 2017-06-12 PROCEDURE — 99233 SBSQ HOSP IP/OBS HIGH 50: CPT

## 2017-06-12 RX ORDER — VALSARTAN 80 MG/1
1 TABLET ORAL
Qty: 0 | Refills: 0 | COMMUNITY

## 2017-06-12 RX ORDER — LACTULOSE 10 G/15ML
20 SOLUTION ORAL ONCE
Qty: 0 | Refills: 0 | Status: DISCONTINUED | OUTPATIENT
Start: 2017-06-12 | End: 2017-06-13

## 2017-06-12 RX ORDER — POLYETHYLENE GLYCOL 3350 17 G/17G
17 POWDER, FOR SOLUTION ORAL DAILY
Qty: 0 | Refills: 0 | Status: DISCONTINUED | OUTPATIENT
Start: 2017-06-12 | End: 2017-06-13

## 2017-06-12 RX ORDER — METOPROLOL TARTRATE 50 MG
50 TABLET ORAL
Qty: 0 | Refills: 0 | Status: DISCONTINUED | OUTPATIENT
Start: 2017-06-12 | End: 2017-06-13

## 2017-06-12 RX ORDER — METOPROLOL TARTRATE 50 MG
1 TABLET ORAL
Qty: 0 | Refills: 0 | COMMUNITY

## 2017-06-12 RX ORDER — SODIUM CHLORIDE 9 MG/ML
1000 INJECTION INTRAMUSCULAR; INTRAVENOUS; SUBCUTANEOUS
Qty: 0 | Refills: 0 | Status: DISCONTINUED | OUTPATIENT
Start: 2017-06-12 | End: 2017-06-13

## 2017-06-12 RX ADMIN — SENNA PLUS 2 TABLET(S): 8.6 TABLET ORAL at 17:27

## 2017-06-12 RX ADMIN — Medication 325 MILLIGRAM(S): at 12:23

## 2017-06-12 RX ADMIN — CELECOXIB 200 MILLIGRAM(S): 200 CAPSULE ORAL at 21:14

## 2017-06-12 RX ADMIN — Medication 300 MILLIGRAM(S): at 12:25

## 2017-06-12 RX ADMIN — Medication 100 MILLIGRAM(S): at 22:24

## 2017-06-12 RX ADMIN — SODIUM CHLORIDE 85 MILLILITER(S): 9 INJECTION INTRAMUSCULAR; INTRAVENOUS; SUBCUTANEOUS at 08:59

## 2017-06-12 RX ADMIN — OXYCODONE HYDROCHLORIDE 10 MILLIGRAM(S): 5 TABLET ORAL at 22:22

## 2017-06-12 RX ADMIN — OXYCODONE HYDROCHLORIDE 20 MILLIGRAM(S): 5 TABLET ORAL at 21:15

## 2017-06-12 RX ADMIN — ENOXAPARIN SODIUM 30 MILLIGRAM(S): 100 INJECTION SUBCUTANEOUS at 06:53

## 2017-06-12 RX ADMIN — Medication 100 MILLIGRAM(S): at 12:25

## 2017-06-12 RX ADMIN — Medication 1 TABLET(S): at 12:23

## 2017-06-12 RX ADMIN — Medication 100 MILLIGRAM(S): at 06:52

## 2017-06-12 RX ADMIN — ENOXAPARIN SODIUM 30 MILLIGRAM(S): 100 INJECTION SUBCUTANEOUS at 17:26

## 2017-06-12 RX ADMIN — Medication 1 MILLIGRAM(S): at 12:23

## 2017-06-12 RX ADMIN — OXYCODONE HYDROCHLORIDE 10 MILLIGRAM(S): 5 TABLET ORAL at 16:12

## 2017-06-12 RX ADMIN — HYDROMORPHONE HYDROCHLORIDE 0.5 MILLIGRAM(S): 2 INJECTION INTRAMUSCULAR; INTRAVENOUS; SUBCUTANEOUS at 14:00

## 2017-06-12 RX ADMIN — OXYCODONE HYDROCHLORIDE 10 MILLIGRAM(S): 5 TABLET ORAL at 23:00

## 2017-06-12 RX ADMIN — OXYCODONE HYDROCHLORIDE 20 MILLIGRAM(S): 5 TABLET ORAL at 06:53

## 2017-06-12 RX ADMIN — OXYCODONE HYDROCHLORIDE 20 MILLIGRAM(S): 5 TABLET ORAL at 17:27

## 2017-06-12 RX ADMIN — Medication 325 MILLIGRAM(S): at 08:59

## 2017-06-12 RX ADMIN — Medication 325 MILLIGRAM(S): at 17:27

## 2017-06-12 RX ADMIN — FAMOTIDINE 20 MILLIGRAM(S): 10 INJECTION INTRAVENOUS at 22:23

## 2017-06-12 RX ADMIN — CELECOXIB 200 MILLIGRAM(S): 200 CAPSULE ORAL at 16:12

## 2017-06-12 RX ADMIN — CELECOXIB 200 MILLIGRAM(S): 200 CAPSULE ORAL at 06:53

## 2017-06-12 RX ADMIN — POLYETHYLENE GLYCOL 3350 17 GRAM(S): 17 POWDER, FOR SOLUTION ORAL at 12:24

## 2017-06-12 RX ADMIN — OXYCODONE HYDROCHLORIDE 10 MILLIGRAM(S): 5 TABLET ORAL at 12:25

## 2017-06-12 RX ADMIN — Medication 50 MILLIGRAM(S): at 12:32

## 2017-06-12 RX ADMIN — FAMOTIDINE 20 MILLIGRAM(S): 10 INJECTION INTRAVENOUS at 06:52

## 2017-06-12 RX ADMIN — OXYCODONE HYDROCHLORIDE 10 MILLIGRAM(S): 5 TABLET ORAL at 00:38

## 2017-06-12 RX ADMIN — OXYCODONE HYDROCHLORIDE 10 MILLIGRAM(S): 5 TABLET ORAL at 08:57

## 2017-06-12 RX ADMIN — FAMOTIDINE 20 MILLIGRAM(S): 10 INJECTION INTRAVENOUS at 17:27

## 2017-06-12 NOTE — PROGRESS NOTE ADULT - PROBLEM SELECTOR PROBLEM 5
Acute blood loss as cause of postoperative anemia

## 2017-06-12 NOTE — PROGRESS NOTE ADULT - ASSESSMENT
62 yo female with arthritis bilateral knee worsening over years , s/p b/l TKA , pain not well controlled , tolerating PT , post op hypotension on iv hydration , post op anemia

## 2017-06-13 ENCOUNTER — INPATIENT (INPATIENT)
Facility: HOSPITAL | Age: 62
LOS: 3 days | Discharge: HOME | DRG: 561 | End: 2017-06-17
Attending: SPECIALIST | Admitting: SPECIALIST
Payer: COMMERCIAL

## 2017-06-13 VITALS
DIASTOLIC BLOOD PRESSURE: 71 MMHG | WEIGHT: 225.09 LBS | RESPIRATION RATE: 18 BRPM | TEMPERATURE: 99 F | SYSTOLIC BLOOD PRESSURE: 122 MMHG | OXYGEN SATURATION: 99 % | HEIGHT: 65 IN | HEART RATE: 82 BPM

## 2017-06-13 VITALS
DIASTOLIC BLOOD PRESSURE: 67 MMHG | HEART RATE: 86 BPM | OXYGEN SATURATION: 100 % | TEMPERATURE: 100 F | RESPIRATION RATE: 16 BRPM | SYSTOLIC BLOOD PRESSURE: 102 MMHG

## 2017-06-13 DIAGNOSIS — Z98.890 OTHER SPECIFIED POSTPROCEDURAL STATES: Chronic | ICD-10-CM

## 2017-06-13 DIAGNOSIS — Z98.1 ARTHRODESIS STATUS: Chronic | ICD-10-CM

## 2017-06-13 DIAGNOSIS — Z90.49 ACQUIRED ABSENCE OF OTHER SPECIFIED PARTS OF DIGESTIVE TRACT: Chronic | ICD-10-CM

## 2017-06-13 DIAGNOSIS — Z51.89 ENCOUNTER FOR OTHER SPECIFIED AFTERCARE: ICD-10-CM

## 2017-06-13 DIAGNOSIS — Z90.710 ACQUIRED ABSENCE OF BOTH CERVIX AND UTERUS: Chronic | ICD-10-CM

## 2017-06-13 LAB — SURGICAL PATHOLOGY FINAL REPORT - CH: SIGNIFICANT CHANGE UP

## 2017-06-13 PROCEDURE — 73562 X-RAY EXAM OF KNEE 3: CPT

## 2017-06-13 PROCEDURE — 86850 RBC ANTIBODY SCREEN: CPT

## 2017-06-13 PROCEDURE — 99232 SBSQ HOSP IP/OBS MODERATE 35: CPT

## 2017-06-13 PROCEDURE — 36415 COLL VENOUS BLD VENIPUNCTURE: CPT

## 2017-06-13 PROCEDURE — 97163 PT EVAL HIGH COMPLEX 45 MIN: CPT

## 2017-06-13 PROCEDURE — 97110 THERAPEUTIC EXERCISES: CPT

## 2017-06-13 PROCEDURE — 97530 THERAPEUTIC ACTIVITIES: CPT

## 2017-06-13 PROCEDURE — 80048 BASIC METABOLIC PNL TOTAL CA: CPT

## 2017-06-13 PROCEDURE — 88311 DECALCIFY TISSUE: CPT

## 2017-06-13 PROCEDURE — 86900 BLOOD TYPING SEROLOGIC ABO: CPT

## 2017-06-13 PROCEDURE — 88305 TISSUE EXAM BY PATHOLOGIST: CPT

## 2017-06-13 PROCEDURE — 86901 BLOOD TYPING SEROLOGIC RH(D): CPT

## 2017-06-13 PROCEDURE — C1776: CPT

## 2017-06-13 PROCEDURE — 85027 COMPLETE CBC AUTOMATED: CPT

## 2017-06-13 PROCEDURE — 97116 GAIT TRAINING THERAPY: CPT

## 2017-06-13 PROCEDURE — 73560 X-RAY EXAM OF KNEE 1 OR 2: CPT

## 2017-06-13 PROCEDURE — C1713: CPT

## 2017-06-13 RX ORDER — OXYCODONE HYDROCHLORIDE 5 MG/1
10 TABLET ORAL
Qty: 0 | Refills: 0 | Status: DISCONTINUED | OUTPATIENT
Start: 2017-06-13 | End: 2017-06-15

## 2017-06-13 RX ORDER — FAMOTIDINE 10 MG/ML
20 INJECTION INTRAVENOUS
Qty: 0 | Refills: 0 | Status: DISCONTINUED | OUTPATIENT
Start: 2017-06-13 | End: 2017-06-17

## 2017-06-13 RX ORDER — METOPROLOL TARTRATE 50 MG
75 TABLET ORAL EVERY 12 HOURS
Qty: 0 | Refills: 0 | Status: DISCONTINUED | OUTPATIENT
Start: 2017-06-13 | End: 2017-06-17

## 2017-06-13 RX ORDER — MULTIVIT WITH MIN/MFOLATE/K2 340-15/3 G
30 POWDER (GRAM) ORAL ONCE
Qty: 0 | Refills: 0 | Status: COMPLETED | OUTPATIENT
Start: 2017-06-13 | End: 2017-06-13

## 2017-06-13 RX ORDER — ENOXAPARIN SODIUM 100 MG/ML
30 INJECTION SUBCUTANEOUS EVERY 12 HOURS
Qty: 0 | Refills: 0 | Status: DISCONTINUED | OUTPATIENT
Start: 2017-06-13 | End: 2017-06-17

## 2017-06-13 RX ORDER — DOCUSATE SODIUM 100 MG
100 CAPSULE ORAL THREE TIMES A DAY
Qty: 0 | Refills: 0 | Status: DISCONTINUED | OUTPATIENT
Start: 2017-06-13 | End: 2017-06-17

## 2017-06-13 RX ORDER — ALLOPURINOL 300 MG
300 TABLET ORAL DAILY
Qty: 0 | Refills: 0 | Status: DISCONTINUED | OUTPATIENT
Start: 2017-06-13 | End: 2017-06-17

## 2017-06-13 RX ORDER — VALSARTAN 80 MG/1
80 TABLET ORAL DAILY
Qty: 0 | Refills: 0 | Status: DISCONTINUED | OUTPATIENT
Start: 2017-06-13 | End: 2017-06-13

## 2017-06-13 RX ORDER — METOPROLOL TARTRATE 50 MG
75 TABLET ORAL
Qty: 0 | Refills: 0 | Status: DISCONTINUED | OUTPATIENT
Start: 2017-06-13 | End: 2017-06-13

## 2017-06-13 RX ORDER — ACETAMINOPHEN 500 MG
650 TABLET ORAL EVERY 6 HOURS
Qty: 0 | Refills: 0 | Status: DISCONTINUED | OUTPATIENT
Start: 2017-06-13 | End: 2017-06-17

## 2017-06-13 RX ORDER — FOLIC ACID 0.8 MG
1 TABLET ORAL DAILY
Qty: 0 | Refills: 0 | Status: DISCONTINUED | OUTPATIENT
Start: 2017-06-13 | End: 2017-06-17

## 2017-06-13 RX ORDER — OXYCODONE HYDROCHLORIDE 5 MG/1
1 TABLET ORAL
Qty: 60 | Refills: 0 | OUTPATIENT
Start: 2017-06-13

## 2017-06-13 RX ORDER — CELECOXIB 200 MG/1
200 CAPSULE ORAL
Qty: 0 | Refills: 0 | Status: DISCONTINUED | OUTPATIENT
Start: 2017-06-13 | End: 2017-06-17

## 2017-06-13 RX ORDER — METOPROLOL TARTRATE 50 MG
100 TABLET ORAL
Qty: 0 | Refills: 0 | Status: DISCONTINUED | OUTPATIENT
Start: 2017-06-13 | End: 2017-06-13

## 2017-06-13 RX ORDER — OXYCODONE HYDROCHLORIDE 5 MG/1
10 TABLET ORAL EVERY 12 HOURS
Qty: 0 | Refills: 0 | Status: DISCONTINUED | OUTPATIENT
Start: 2017-06-13 | End: 2017-06-15

## 2017-06-13 RX ORDER — SENNA PLUS 8.6 MG/1
2 TABLET ORAL AT BEDTIME
Qty: 0 | Refills: 0 | Status: DISCONTINUED | OUTPATIENT
Start: 2017-06-13 | End: 2017-06-17

## 2017-06-13 RX ORDER — FERROUS SULFATE 325(65) MG
325 TABLET ORAL DAILY
Qty: 0 | Refills: 0 | Status: DISCONTINUED | OUTPATIENT
Start: 2017-06-13 | End: 2017-06-17

## 2017-06-13 RX ORDER — FUROSEMIDE 40 MG
20 TABLET ORAL DAILY
Qty: 0 | Refills: 0 | Status: DISCONTINUED | OUTPATIENT
Start: 2017-06-13 | End: 2017-06-17

## 2017-06-13 RX ORDER — VALSARTAN 80 MG/1
80 TABLET ORAL DAILY
Qty: 0 | Refills: 0 | Status: DISCONTINUED | OUTPATIENT
Start: 2017-06-13 | End: 2017-06-17

## 2017-06-13 RX ORDER — OXYCODONE HYDROCHLORIDE 5 MG/1
5 TABLET ORAL
Qty: 0 | Refills: 0 | Status: DISCONTINUED | OUTPATIENT
Start: 2017-06-13 | End: 2017-06-15

## 2017-06-13 RX ADMIN — OXYCODONE HYDROCHLORIDE 20 MILLIGRAM(S): 5 TABLET ORAL at 06:33

## 2017-06-13 RX ADMIN — Medication 100 MILLIGRAM(S): at 09:42

## 2017-06-13 RX ADMIN — Medication 50 MILLIGRAM(S): at 18:34

## 2017-06-13 RX ADMIN — Medication 300 MILLIGRAM(S): at 11:58

## 2017-06-13 RX ADMIN — OXYCODONE HYDROCHLORIDE 20 MILLIGRAM(S): 5 TABLET ORAL at 07:15

## 2017-06-13 RX ADMIN — FAMOTIDINE 20 MILLIGRAM(S): 10 INJECTION INTRAVENOUS at 06:41

## 2017-06-13 RX ADMIN — CELECOXIB 200 MILLIGRAM(S): 200 CAPSULE ORAL at 06:32

## 2017-06-13 RX ADMIN — OXYCODONE HYDROCHLORIDE 10 MILLIGRAM(S): 5 TABLET ORAL at 13:42

## 2017-06-13 RX ADMIN — Medication 30 MILLILITER(S): at 09:27

## 2017-06-13 RX ADMIN — Medication 325 MILLIGRAM(S): at 18:34

## 2017-06-13 RX ADMIN — OXYCODONE HYDROCHLORIDE 20 MILLIGRAM(S): 5 TABLET ORAL at 18:31

## 2017-06-13 RX ADMIN — Medication 100 MILLIGRAM(S): at 21:52

## 2017-06-13 RX ADMIN — VALSARTAN 80 MILLIGRAM(S): 80 TABLET ORAL at 09:28

## 2017-06-13 RX ADMIN — Medication 325 MILLIGRAM(S): at 11:58

## 2017-06-13 RX ADMIN — Medication 1 TABLET(S): at 11:58

## 2017-06-13 RX ADMIN — CELECOXIB 200 MILLIGRAM(S): 200 CAPSULE ORAL at 07:15

## 2017-06-13 RX ADMIN — SODIUM CHLORIDE 100 MILLILITER(S): 9 INJECTION, SOLUTION INTRAVENOUS at 09:28

## 2017-06-13 RX ADMIN — OXYCODONE HYDROCHLORIDE 10 MILLIGRAM(S): 5 TABLET ORAL at 10:57

## 2017-06-13 RX ADMIN — HYDROMORPHONE HYDROCHLORIDE 0.5 MILLIGRAM(S): 2 INJECTION INTRAMUSCULAR; INTRAVENOUS; SUBCUTANEOUS at 15:42

## 2017-06-13 RX ADMIN — OXYCODONE HYDROCHLORIDE 10 MILLIGRAM(S): 5 TABLET ORAL at 03:00

## 2017-06-13 RX ADMIN — OXYCODONE HYDROCHLORIDE 10 MILLIGRAM(S): 5 TABLET ORAL at 21:50

## 2017-06-13 RX ADMIN — HYDROMORPHONE HYDROCHLORIDE 0.5 MILLIGRAM(S): 2 INJECTION INTRAMUSCULAR; INTRAVENOUS; SUBCUTANEOUS at 16:00

## 2017-06-13 RX ADMIN — CELECOXIB 200 MILLIGRAM(S): 200 CAPSULE ORAL at 15:40

## 2017-06-13 RX ADMIN — Medication 1 MILLIGRAM(S): at 11:58

## 2017-06-13 RX ADMIN — OXYCODONE HYDROCHLORIDE 10 MILLIGRAM(S): 5 TABLET ORAL at 14:30

## 2017-06-13 RX ADMIN — Medication 100 MILLIGRAM(S): at 13:41

## 2017-06-13 RX ADMIN — Medication 100 MILLIGRAM(S): at 06:32

## 2017-06-13 RX ADMIN — OXYCODONE HYDROCHLORIDE 10 MILLIGRAM(S): 5 TABLET ORAL at 02:44

## 2017-06-13 RX ADMIN — OXYCODONE HYDROCHLORIDE 10 MILLIGRAM(S): 5 TABLET ORAL at 09:37

## 2017-06-13 RX ADMIN — Medication 325 MILLIGRAM(S): at 09:29

## 2017-06-13 RX ADMIN — CELECOXIB 200 MILLIGRAM(S): 200 CAPSULE ORAL at 16:00

## 2017-06-13 RX ADMIN — ENOXAPARIN SODIUM 30 MILLIGRAM(S): 100 INJECTION SUBCUTANEOUS at 18:27

## 2017-06-13 RX ADMIN — FAMOTIDINE 20 MILLIGRAM(S): 10 INJECTION INTRAVENOUS at 18:31

## 2017-06-13 RX ADMIN — OXYCODONE HYDROCHLORIDE 20 MILLIGRAM(S): 5 TABLET ORAL at 19:58

## 2017-06-13 RX ADMIN — ENOXAPARIN SODIUM 30 MILLIGRAM(S): 100 INJECTION SUBCUTANEOUS at 06:33

## 2017-06-13 NOTE — PROGRESS NOTE ADULT - PROBLEM SELECTOR PLAN 2
hypotension improved continue  on metoprolol with holding parameters  resume diovan , stop iv fluid
hypotension on metoprolol  will hold diovan , resume when BP improves
hypotension on metoprolol with holding parameters  off diovan , continue iv hydration for hypotension
controlled on metoprolol, diovan hold parameters

## 2017-06-13 NOTE — PROGRESS NOTE ADULT - PROBLEM SELECTOR PROBLEM 1
Primary osteoarthritis of both knees

## 2017-06-13 NOTE — PROGRESS NOTE ADULT - SUBJECTIVE AND OBJECTIVE BOX
Ortho Post Op Check    Name: YVONNE LUJAN    MR #: [id number]    Procedure: s/p Bilateral TKA  Surgeon: Dr Harrell    Pt comfortable without complaints, pain controlled  Denies CP, SOB, N/V, numbness/tingling     General Exam:  Vital Signs Last 24 Hrs  T(C): 38.1, Max: 38.1 (06-09 @ 08:00)  T(F): 100.6, Max: 100.6 (06-09 @ 08:00)  HR: 94 (84 - 94)  BP: 111/66 (100/53 - 111/66)  BP(mean): --  RR: 16 (16 - 18)  SpO2: 99% (97% - 99%)  Wt(kg): --    General: Pt Alert and oriented, NAD, controlled pain.  Dressings C/D/I. No bleeding.  Pulses: 2+ dorsalis pedis pulse. Cap refill < 2 sec.  Sensation: Grossly intact to light touch without deficit.  Motor: + EHL/FHL/TA/GS                          11.1   9.6   )-----------( 233      ( 09 Jun 2017 05:44 )             33.8   09 Jun 2017 05:44    138    |  102    |  14.0   ----------------------------<  115    4.1     |  26.0   |  0.78     Ca    8.4        09 Jun 2017 05:44        Post-op X-Ray:    EXAM:  KNEE-BILATERAL                          PROCEDURE DATE:  06/08/2017        INTERPRETATION:  TECHNIQUE: 4 views bilateral knees    COMPARISON: None    CLINICAL HISTORY: Or case    FINDINGS:    Frontal, lateral and oblique views of the bilateral knees shows no   evidence for acute fracture, subluxation or dislocation. Patient is   status post bilateral total knee arthroplasties with patellar   resurfacing. Hardware is in good position. Postoperative soft tissue   swelling and air are demonstrated.    IMPRESSION:  Status post bilateral total knee arthroplasties..        SHAKIR HOFFMAN M.D., ATTENDING RADIOLOGIST  This document has been electronically signed. Jun 8 2017  2:31PM          A/P: 61yFemale POD#1 s/p Bilateral TKA  - Stable  - Pain Control  - DVT ppx: Lovenox  - PT eval pending  - Weight bearing status: WBAT  - DC planning to [facility] vs [facility]
Ortho Post Op Check    Name: YVONNE LUJAN    MR #: [id number]    Procedure: Bilateral Total Knee Arthroplasty  Surgeon: Agus Harrell    Pt comfortable without complaints, pain controlled, patient found laying in bed comfortable, states she was able to ambulate and work with PT while in PACU  Denies CP, SOB, N/V, numbness/tingling     General Exam:  Vital Signs Last 24 Hrs  T(C): 36.8, Max: 36.8 (06-08 @ 21:09)  T(F): 98.3, Max: 98.3 (06-08 @ 21:09)  HR: 73 (67 - 73)  BP: 102/63 (102/63 - 104/59)  BP(mean): --  RR: 19 (19 - 19)  SpO2: 99% (99% - 99%)  Wt(kg): --    General: Pt Alert and oriented, NAD, controlled pain.  Dressings C/D/I. No bleeding.  Pulses: 2+ dorsalis pedis pulse. Cap refill < 2 sec.  Sensation: Grossly intact to light touch without deficit.  Motor: + EHL/FHL/TA/GS    Post-op X-Ray:  EXAM:  KNEE-BILATERAL                          PROCEDURE DATE:  06/08/2017        INTERPRETATION:  TECHNIQUE: 4 views bilateral knees    COMPARISON: None    CLINICAL HISTORY: Or case    FINDINGS:    Frontal, lateral and oblique views of the bilateral knees shows no   evidence for acute fracture, subluxation or dislocation. Patient is   status post bilateral total knee arthroplasties with patellar   resurfacing. Hardware is in good position. Postoperative soft tissue   swelling and air are demonstrated.    IMPRESSION:  Status post bilateral total knee arthroplasties..    SHAKIR HOFFMAN M.D., ATTENDING RADIOLOGIST  This document has been electronically signed. Jun 8 2017  2:31PM        A/P: 61yFemale POD#0 s/p Bilateral Total Knee Arthroplasty  - Stable  - Pain Control  - DVT ppx: Lovenox 30mg BID, SCDs  - Post op abx: Ancef, Vancomycin  - Continue PT protocol for Total Knee Arthroplasty  - Weight bearing status: WBAT  - D/C Planning (Home vs Rehab)
PA - Note    S/P Bilateral Total Knee Arthroplasties POD #4  Patient found awake and sitting up in bed.  Patient is doing well with some complaints of bed.  However, pain is being controlled with medication.  No new issues or complaints overnight.  Waiting for approval to Acute Rehab.    Physical:  Vital Signs Last 24 Hrs  T(C): 37.5, Max: 37.7 (06-11 @ 16:24)  T(F): 99.5, Max: 99.8 (06-11 @ 16:24)  HR: 86 (80 - 119)  BP: 100/59 (95/60 - 122/73)  BP(mean): --  RR: 18 (18 - 18)  SpO2: 98% (95% - 100%)    Bilateral Lower Extremities:  Honeycomb dressing are both knees are C/D/I and no active drainage noted  + ROM of Toes, + Dorsal Flexion of feet  2+ Pedal Pulses  + Sensation  Calfs soft, non-tender    A/P: Bilateral Total Knee Arthroplasties  - OOB, PT, WBAT  - Pain medication as needed  - Hospitalist for medical management  - DVT Prophylaxis: Lovenox 30mg BID, SCDs  - D/C Planning (Acute Rehab)  - Orthopedically Stable for D/C
Patient seen and examined . S/p b/l TKA   c/o severe pain both knees , tearful thsi am ;denies nausea, cough, dizziness     Vital Signs Last 24 Hrs  T(C): 37.2, Max: 37.7 (06-11 @ 16:24)  T(F): 99, Max: 99.8 (06-11 @ 16:24)  HR: 73 (73 - 119)  BP: 119/69 (95/60 - 122/73)  BP(mean): --  RR: 16 (16 - 18)  SpO2: 100% (95% - 100%)        GENERAL: NAD, well-groomed, well-developed  CHEST/LUNG: CTA b/l ,  no  rales, rhonchi, wheezing, or rubs  HEART: Regular rate and rhythm; No murmurs, rubs, or gallops  ABDOMEN: Soft, Nontender, Nondistended; Bowel sounds present  EXTREMITIES:  2+ Peripheral Pulses, No clubbing, cyanosis, or edema , b/l knee dressing  + , clean and dry , no skin redness                                                         9.8    8.4   )-----------( 239      ( 11 Jun 2017 07:30 )             29.7   06-11    136  |  98  |  12.0  ----------------------------<  139<H>  4.2   |  27.0  |  0.64    Ca    9.7      11 Jun 2017 07:30
Patient seen and examined . S/p b/l TKA   chart reviewed , denies nausea, cough, dizziness   pain well controlled today       Vital Signs Last 24 Hrs  T(C): 37.3, Max: 37.7 (06-10 @ 16:08)  T(F): 99.1, Max: 99.8 (06-10 @ 16:08)  HR: 80 (76 - 88)  BP: 99/61 (90/60 - 111/63)  BP(mean): --  RR: 18 (18 - 18)  SpO2: 98% (95% - 99%)GENERAL: NAD, well-groomed, well-developed  CHEST/LUNG: CTA b/l ,  no  rales, rhonchi, wheezing, or rubs  HEART: Regular rate and rhythm; No murmurs, rubs, or gallops  ABDOMEN: Soft, Nontender, Nondistended; Bowel sounds present  EXTREMITIES:  2+ Peripheral Pulses, No clubbing, cyanosis, or edema , b/l knee dressing  + , clean and dry , no skin redness                                          9.8    8.4   )-----------( 239      ( 11 Jun 2017 07:30 )             29.7   06-11    136  |  98  |  12.0  ----------------------------<  139<H>  4.2   |  27.0  |  0.64    Ca    9.7      11 Jun 2017 07:30
Patient seen and examined . S/p b/l TKA , POD # 1    CC : B/Lm knee pain , well controlled , no other complaints .        PAST MEDICAL & SURGICAL HISTORY:  Thyroid nodule  Hypertension  S/P hysterectomy  S/P carpal tunnel release: R  S/P thyroid surgery  S/P hernia repair  S/P shoulder surgery: bilat rotator cuff  S/P lumbar fusion  S/P cholecystectomy      MEDICATIONS  (STANDING):  vancomycin  IVPB 1500milliGRAM(s) IV Intermittent once  lactated ringers. 1000milliLiter(s) IV Continuous <Continuous>  docusate sodium 100milliGRAM(s) Oral three times a day  ferrous    sulfate 325milliGRAM(s) Oral three times a day with meals  folic acid 1milliGRAM(s) Oral daily  multivitamin 1Tablet(s) Oral daily  oxyCODONE ER Tablet 10milliGRAM(s) Oral every 12 hours  valsartan 80milliGRAM(s) Oral daily  allopurinol 300milliGRAM(s) Oral daily  famotidine    Tablet 20milliGRAM(s) Oral two times a day  furosemide    Tablet 20milliGRAM(s) Oral daily  metoprolol 100milliGRAM(s) Oral <User Schedule>  metoprolol 50milliGRAM(s) Oral <User Schedule>  enoxaparin Injectable 30milliGRAM(s) SubCutaneous two times a day    MEDICATIONS  (PRN):  acetaminophen   Tablet 650milliGRAM(s) Oral every 6 hours PRN For Temp over 38.3 C (100.94 F)  oxyCODONE IR 5milliGRAM(s) Oral every 3 hours PRN Mild Pain  oxyCODONE IR 10milliGRAM(s) Oral every 3 hours PRN Moderate Pain  HYDROmorphone  Injectable 0.5milliGRAM(s) IV Push every 4 hours PRN Severe Pain/breakthrough pain  aluminum hydroxide/magnesium hydroxide/simethicone Suspension 30milliLiter(s) Oral four times a day PRN Indigestion  ondansetron Injectable 4milliGRAM(s) IV Push every 6 hours PRN Nausea and/or Vomiting  magnesium hydroxide Suspension 30milliLiter(s) Oral daily PRN Constipation  senna 2Tablet(s) Oral at bedtime PRN Constipation      LABS:                          11.1   9.6   )-----------( 233      ( 09 Jun 2017 05:44 )             33.8     06-09    138  |  102  |  14.0  ----------------------------<  115  4.1   |  26.0  |  0.78    Ca    8.4<L>      09 Jun 2017 05:44      RADIOLOGY & ADDITIONAL TESTS:       EXAM:  KNEE-BILATERAL                          PROCEDURE DATE:  06/08/2017        INTERPRETATION:  TECHNIQUE: 4 views bilateral knees    COMPARISON: None    CLINICAL HISTORY: Or case    FINDINGS:    Frontal, lateral and oblique views of the bilateral knees shows no   evidence for acute fracture, subluxation or dislocation. Patient is   status post bilateral total knee arthroplasties with patellar   resurfacing. Hardware is in good position. Postoperative soft tissue   swelling and air are demonstrated.    IMPRESSION:  Status post bilateral total knee arthroplasties..        SHAKIR HOFFMAN M.D., ATTENDING RADIOLOGIST  This document has been electronically signed. Jun 8 2017  2:31PM          REVIEW OF SYSTEMS:    CONSTITUTIONAL: No fever, weight loss, or fatigue  EYES: No eye pain, visual disturbances, or discharge  ENMT:  No difficulty hearing, tinnitus, vertigo; No sinus or throat pain  NECK: No pain or stiffness  RESPIRATORY: No cough, wheezing, chills or hemoptysis; No shortness of breath  CARDIOVASCULAR: No chest pain, palpitations, dizziness, or leg swelling  GASTROINTESTINAL: No abdominal or epigastric pain. No nausea, vomiting, or hematemesis; No diarrhea or constipation. No melena or hematochezia.  GENITOURINARY: No dysuria, frequency, hematuria, or incontinence  NEUROLOGICAL: No headaches, memory loss, loss of strength, numbness, or tremors  SKIN: No itching, burning, rashes, or lesions   LYMPH NODES: No enlarged glands  ENDOCRINE: No heat or cold intolerance; No hair loss  MUSCULOSKELETAL: b/l knee pain  PSYCHIATRIC: No depression, anxiety, mood swings, or difficulty sleeping  HEME/LYMPH: No easy bruising, or bleeding gums  ALLERGY AND IMMUNOLOGIC: No hives or eczema    Vital Signs Last 24 Hrs  T(C): 37.7, Max: 37.7 (06-09 @ 04:51)  T(F): 99.8, Max: 99.8 (06-09 @ 04:51)  HR: 84 (63 - 84)  BP: 100/53 (93/45 - 108/44)  BP(mean): --  RR: 18 (15 - 19)  SpO2: 97% (97% - 100%)  PHYSICAL EXAM:    GENERAL: NAD, well-groomed, well-developed  HEAD:  Atraumatic, Normocephalic  EYES: EOMI, PERRLA, conjunctiva and sclera clear  NECK: Supple, No JVD, Normal thyroid  NERVOUS SYSTEM:  Alert & Oriented X3, no focal deficit  CHEST/LUNG: CTA b/l ,  no  rales, rhonchi, wheezing, or rubs  HEART: Regular rate and rhythm; No murmurs, rubs, or gallops  ABDOMEN: Soft, Nontender, Nondistended; Bowel sounds present  EXTREMITIES:  2+ Peripheral Pulses, No clubbing, cyanosis, or edema , b/l knee ACE wrap + , clean and dry   LYMPH: No lymphadenopathy noted  SKIN: No rashes or lesions
Patient seen and examined . S/p b/l TKA , POD # 2  chart reviewed   + fever overnight , denies nausea, cough, dizziness     CC : B/L knee pain  worse now after physical therapy medicated       Vital Signs Last 24 Hrs  T(C): 37.8, Max: 38.4 (06-09 @ 15:53)  T(F): 100, Max: 101.2 (06-09 @ 15:53)  HR: 79 (79 - 93)  BP: 118/62 (97/59 - 160/66)  BP(mean): --  RR: 18 (16 - 20)  SpO2: 99% (94% - 99%)  GENERAL: NAD, well-groomed, well-developed  NERVOUS SYSTEM:  Alert & Oriented X3, no focal deficit  CHEST/LUNG: CTA b/l ,  no  rales, rhonchi, wheezing, or rubs  HEART: Regular rate and rhythm; No murmurs, rubs, or gallops  ABDOMEN: Soft, Nontender, Nondistended; Bowel sounds present  EXTREMITIES:  2+ Peripheral Pulses, No clubbing, cyanosis, or edema , b/l knee dressing  + , clean and dry , no skin redness  SKIN: No rashes or lesions                          11.0   10.0  )-----------( 244      ( 10 August 2017 07:22 )             32.6   06-10    138  |  101  |  8.0  ----------------------------<  135<H>  4.4   |  27.0  |  0.60    Ca    9.6      10 August 2017 07:22
Patient seen and examined . S/p b/l TKA ,c/o constipation no BM since last week  pain in both knees controlled on current therapy   She also reports skin pimple with discharge yesterday around her perirectal area , no pain     Vital Signs Last 24 Hrs  T(C): 37.1, Max: 37.5 (06-12 @ 15:59)  T(F): 98.7, Max: 99.5 (06-12 @ 15:59)  HR: 86 (78 - 86)  BP: 136/70 (121/72 - 146/70)  BP(mean): --  RR: 16 (16 - 18)  SpO2: 100% (98% - 100%)    GENERAL: NAD, well-groomed, well-developed  CHEST/LUNG: CTA b/l ,  no  rales, rhonchi, wheezing, or rubs  HEART: Regular rate and rhythm; No murmurs, rubs, or gallops  ABDOMEN: Soft, Nontender, Nondistended; Bowel sounds present  EXTREMITIES:  2+ Peripheral Pulses, No clubbing, cyanosis, or edema , b/l knee dressing  + , clean and dry , no skin redness
YVONNE LUJAN  [id number]    History: 61y Female is status post bilateral total knee arthroplasties on 6/8/2017, POD #3. Patient is doing well has been walking to bathroom using walker . The patient's pain is controlled using the prescribed pain medications. The patient is participating in physical therapy. Denies nausea, vomiting, chest pain, shortness of breath, abdominal pain or fever. No new complaints.                              9.8    8.4   )-----------( 239      ( 11 Jun 2017 07:30 )             29.7               Physical exam: The left knee dressing is clean, dry and intact. No drainage or discharge. No erythema is noted. No blistering. No ecchymosis. The calf is supple nontender. Passive range of motion is acceptable to due postoperative pain. No calf tenderness. Sensation to light touch is grossly intact distally. Motor function distally is 5/5. Extensor hallucis longus and flexor hallucis longus are intact. No foot drop. 2+ dorsalis pedis pulse. Capillary refill is less than 2 seconds. No cyanosis.   The right knee dressing is clean, dry and intact. No drainage or discharge. No erythema is noted. No blistering. No ecchymosis. The calf is supple nontender. Passive range of motion is acceptable to due postoperative pain. No calf tenderness. Sensation to light touch is grossly intact distally. Motor function distally is 5/5. Extensor hallucis longus and flexor hallucis longus are intact. No foot drop. 2+ dorsalis pedis pulse. Capillary refill is less than 2 seconds. No cyanosis.       Primary Orthopedic Assessment:  •	s/p bilateral total knee arthroplasties pod #3	    Plan:   •	DVT prophylaxis as prescribed, including use of compression devices and ankle pumps  •	Continue physical therapy  •	Weightbearing as tolerated of the bilateral lower extremities with assistance of a walker  •	Incentive spirometry encouraged  •	Pain control as clinically indicated  •	Discharge planning – anticipated discharge is acute rehabilitation
YVONNE LUJAN  [id number]    History: 61y Female is status post bilateral total knee arthroplasty, POD # 5. Patient is doing well and is comfortable. The patient's pain is controlled using the prescribed pain medications. The patient is participating in physical therapy. She ambulates down [address] and to bathroom. No BM.  Denies nausea, vomiting, chest pain, shortness of breath, abdominal pain or fever. No new complaints.              MEDICATIONS  (STANDING):  vancomycin  IVPB 1500milliGRAM(s) IV Intermittent once  docusate sodium 100milliGRAM(s) Oral three times a day  ferrous    sulfate 325milliGRAM(s) Oral three times a day with meals  folic acid 1milliGRAM(s) Oral daily  multivitamin 1Tablet(s) Oral daily  allopurinol 300milliGRAM(s) Oral daily  famotidine    Tablet 20milliGRAM(s) Oral two times a day  furosemide    Tablet 20milliGRAM(s) Oral daily  metoprolol 50milliGRAM(s) Oral <User Schedule>  enoxaparin Injectable 30milliGRAM(s) SubCutaneous two times a day  lactated ringers. 1000milliLiter(s) IV Continuous <Continuous>  oxyCODONE ER Tablet 20milliGRAM(s) Oral every 12 hours  celecoxib 200milliGRAM(s) Oral two times a day before meals  polyethylene glycol 3350 17Gram(s) Oral daily  metoprolol 75milliGRAM(s) Oral <User Schedule>  valsartan 80milliGRAM(s) Oral daily    MEDICATIONS  (PRN):  acetaminophen   Tablet 650milliGRAM(s) Oral every 6 hours PRN For Temp over 38.3 C (100.94 F)  oxyCODONE IR 5milliGRAM(s) Oral every 3 hours PRN Mild Pain  oxyCODONE IR 10milliGRAM(s) Oral every 3 hours PRN Moderate Pain  HYDROmorphone  Injectable 0.5milliGRAM(s) IV Push every 4 hours PRN Severe Pain/breakthrough pain  aluminum hydroxide/magnesium hydroxide/simethicone Suspension 30milliLiter(s) Oral four times a day PRN Indigestion  ondansetron Injectable 4milliGRAM(s) IV Push every 6 hours PRN Nausea and/or Vomiting  magnesium hydroxide Suspension 30milliLiter(s) Oral daily PRN Constipation  senna 2Tablet(s) Oral at bedtime PRN Constipation  lactulose Syrup 20Gram(s) Oral once PRN no bm    Vital Signs Last 24 Hrs  T(C): 37.4, Max: 37.5 (06-12 @ 15:59)  T(F): 99.3, Max: 99.5 (06-12 @ 15:59)  HR: 86 (73 - 86)  BP: 146/70 (119/69 - 146/70)  BP(mean): --  RR: 18 (16 - 18)  SpO2: 98% (98% - 100%)  NAD  Physical exam: Bilateral lower extremities- The knee dressings are clean, dry and intact. No drainage or discharge. No erythema is noted. No blistering. No ecchymosis. The calf is supple nontender. Sensation to light touch is grossly intact distally. Motor function distally is 5/5. Extensor hallucis longus and flexor hallucis longus are intact. No foot drop. 2+ dorsalis pedis pulse. Capillary refill is less than 2 seconds. No cyanosis.    Primary Orthopedic Assessment:  •	s/p bilateral total knee replacements, POD#5	    Secondary  Medical Assessment(s):   •	Constipation    Plan:   •	DVT prophylaxis as prescribed- lovenox, including use of compression devices and ankle pumps  •	Continue physical therapy  •	Weightbearing as tolerated of the bilateral lower extremity with assistance of a walker  •	Incentive spirometry encouraged  •	Pain control as clinically indicated  -           Bowel regimen  •	Discharge planning – anticipated discharge is  acute rehabilitation pending approval
YVONNE LUJAN  [id number]    History: 61y Female is status post bilateral total knee arthroplasty, POD # 2. Patient is doing well and is comfortable. The patient's pain is controlled using the prescribed pain medications. The patient is participating in physical therapy. She ambulated with RW and sat in chair. Denies nausea, vomiting, chest pain, shortness of breath, abdominal pain or fever. No new complaints.                          11.0   10.0  )-----------( 244      ( 10 August 2017 07:22 )             32.6     06-10    138  |  101  |  8.0  ----------------------------<  135<H>  4.4   |  27.0  |  0.60    Ca    9.6      10 August 2017 07:22        MEDICATIONS  (STANDING):  vancomycin  IVPB 1500milliGRAM(s) IV Intermittent once  docusate sodium 100milliGRAM(s) Oral three times a day  ferrous    sulfate 325milliGRAM(s) Oral three times a day with meals  folic acid 1milliGRAM(s) Oral daily  multivitamin 1Tablet(s) Oral daily  valsartan 80milliGRAM(s) Oral daily  allopurinol 300milliGRAM(s) Oral daily  famotidine    Tablet 20milliGRAM(s) Oral two times a day  furosemide    Tablet 20milliGRAM(s) Oral daily  metoprolol 100milliGRAM(s) Oral <User Schedule>  metoprolol 50milliGRAM(s) Oral <User Schedule>  enoxaparin Injectable 30milliGRAM(s) SubCutaneous two times a day  lactated ringers. 1000milliLiter(s) IV Continuous <Continuous>  oxyCODONE ER Tablet 20milliGRAM(s) Oral every 12 hours  celecoxib 200milliGRAM(s) Oral two times a day before meals    MEDICATIONS  (PRN):  acetaminophen   Tablet 650milliGRAM(s) Oral every 6 hours PRN For Temp over 38.3 C (100.94 F)  oxyCODONE IR 5milliGRAM(s) Oral every 3 hours PRN Mild Pain  oxyCODONE IR 10milliGRAM(s) Oral every 3 hours PRN Moderate Pain  HYDROmorphone  Injectable 0.5milliGRAM(s) IV Push every 4 hours PRN Severe Pain/breakthrough pain  aluminum hydroxide/magnesium hydroxide/simethicone Suspension 30milliLiter(s) Oral four times a day PRN Indigestion  ondansetron Injectable 4milliGRAM(s) IV Push every 6 hours PRN Nausea and/or Vomiting  magnesium hydroxide Suspension 30milliLiter(s) Oral daily PRN Constipation  senna 2Tablet(s) Oral at bedtime PRN Constipation    Vital Signs Last 24 Hrs  T(C): 37.8, Max: 38.4 (06-09 @ 15:53)  T(F): 100, Max: 101.2 (06-09 @ 15:53)  HR: 79 (79 - 93)  BP: 118/62 (97/59 - 160/66)  BP(mean): --  RR: 18 (16 - 20)  SpO2: 99% (94% - 99%)  NAD  Physical exam: Bilateral lower extremities- The knee dressings are clean, dry and intact. Prineo dry and intact. No drainage or discharge. No erythema is noted. No blistering. No ecchymosis. The calves are supple, nontender. Sensation to light touch is grossly intact distally. Motor function distally is 5/5. Extensor hallucis longus and flexor hallucis longus are intact b/l. No foot drop. 2+ dorsalis pedis pulse b/l. Capillary refill is less than 2 seconds. No cyanosis.     Primary Orthopedic Assessment:  •	s/p bilateral total knee replacements, POD#2  	    Plan:   -           Dsgs changed b/l knees  •	DVT prophylaxis- lovenox, including use of compression devices and ankle pumps  •	Continue physical therapy  •	Weightbearing as tolerated of the bilateral lower extremity with assistance of a walker  •	Incentive spirometry encouraged  •	Pain control as clinically indicated  •	Discharge planning – anticipated discharge is  acute rehabilitation pending approval

## 2017-06-13 NOTE — PROGRESS NOTE ADULT - PROBLEM SELECTOR PLAN 1
s/p knee replacement , pain medication add  cereblex  discussed with orthopedics PA   post op DVt prophylaxis lovenox 30 mg sq bid per surgery team
s/p knee replacement , pain medication add  cereblex  discussed with orthopedics PA   post op DVt prophylaxis lovenox 30 mg sq bid per surgery team  waiting for insurance auth for rehab
s/p knee replacement , pain medication as needed   post op DVt prophylaxis lovenox 30 mg sq bid per surgery team  waiting for insurance auth for rehab , medically stable
s/p knee replacement , pain medication add  cereblex  discussed with orthopedics PA   post op DVt prophylaxis lovenox 30 mg sq bid per surgery team

## 2017-06-13 NOTE — PROGRESS NOTE ADULT - ASSESSMENT
60 yo female with arthritis bilateral knee worsening over years , s/p b/l TKA , pain not well controlled , tolerating PT , post op hypotension on iv hydration , post op anemia   constipated

## 2017-06-13 NOTE — PROGRESS NOTE ADULT - PROBLEM SELECTOR PLAN 3
likely reactive no other symptoms   improved , no sign of infection
likely reactive no other symptoms   will monitor improving
no indication for transfusion stable Hb
likely reactive no other symptoms   will monitor

## 2017-06-13 NOTE — PROGRESS NOTE ADULT - PROVIDER SPECIALTY LIST ADULT
Hospitalist
Orthopedics

## 2017-06-13 NOTE — PROGRESS NOTE ADULT - PROBLEM SELECTOR PROBLEM 3
Acute blood loss as cause of postoperative anemia
Fever, postprocedural

## 2017-06-14 LAB
ALBUMIN SERPL ELPH-MCNC: 3.5 G/DL — SIGNIFICANT CHANGE UP (ref 3.3–5.2)
ALP SERPL-CCNC: 82 U/L — SIGNIFICANT CHANGE UP (ref 40–120)
ALT FLD-CCNC: 21 U/L — SIGNIFICANT CHANGE UP
ANION GAP SERPL CALC-SCNC: 11 MMOL/L — SIGNIFICANT CHANGE UP (ref 5–17)
AST SERPL-CCNC: 20 U/L — SIGNIFICANT CHANGE UP
BASOPHILS # BLD AUTO: 0 K/UL — SIGNIFICANT CHANGE UP (ref 0–0.2)
BASOPHILS NFR BLD AUTO: 0.4 % — SIGNIFICANT CHANGE UP (ref 0–2)
BILIRUB SERPL-MCNC: 0.7 MG/DL — SIGNIFICANT CHANGE UP (ref 0.4–2)
BUN SERPL-MCNC: 14 MG/DL — SIGNIFICANT CHANGE UP (ref 8–20)
CALCIUM SERPL-MCNC: 9.6 MG/DL — SIGNIFICANT CHANGE UP (ref 8.6–10.2)
CHLORIDE SERPL-SCNC: 99 MMOL/L — SIGNIFICANT CHANGE UP (ref 98–107)
CO2 SERPL-SCNC: 29 MMOL/L — SIGNIFICANT CHANGE UP (ref 22–29)
CREAT SERPL-MCNC: 0.62 MG/DL — SIGNIFICANT CHANGE UP (ref 0.5–1.3)
EOSINOPHIL # BLD AUTO: 0.6 K/UL — HIGH (ref 0–0.5)
EOSINOPHIL NFR BLD AUTO: 8.6 % — HIGH (ref 0–6)
GLUCOSE SERPL-MCNC: 102 MG/DL — SIGNIFICANT CHANGE UP (ref 70–115)
HCT VFR BLD CALC: 30.3 % — LOW (ref 37–47)
HGB BLD-MCNC: 10 G/DL — LOW (ref 12–16)
LYMPHOCYTES # BLD AUTO: 1.6 K/UL — SIGNIFICANT CHANGE UP (ref 1–4.8)
LYMPHOCYTES # BLD AUTO: 21.1 % — SIGNIFICANT CHANGE UP (ref 20–55)
MCHC RBC-ENTMCNC: 30.8 PG — SIGNIFICANT CHANGE UP (ref 27–31)
MCHC RBC-ENTMCNC: 33 G/DL — SIGNIFICANT CHANGE UP (ref 32–36)
MCV RBC AUTO: 93.2 FL — SIGNIFICANT CHANGE UP (ref 81–99)
MONOCYTES # BLD AUTO: 0.8 K/UL — SIGNIFICANT CHANGE UP (ref 0–0.8)
MONOCYTES NFR BLD AUTO: 11.1 % — HIGH (ref 3–10)
NEUTROPHILS # BLD AUTO: 4.4 K/UL — SIGNIFICANT CHANGE UP (ref 1.8–8)
NEUTROPHILS NFR BLD AUTO: 58.3 % — SIGNIFICANT CHANGE UP (ref 37–73)
PLATELET # BLD AUTO: 336 K/UL — SIGNIFICANT CHANGE UP (ref 150–400)
POTASSIUM SERPL-MCNC: 4.4 MMOL/L — SIGNIFICANT CHANGE UP (ref 3.5–5.3)
POTASSIUM SERPL-SCNC: 4.4 MMOL/L — SIGNIFICANT CHANGE UP (ref 3.5–5.3)
PREALB SERPL-MCNC: 15 MG/DL — LOW (ref 18–38)
PROT SERPL-MCNC: 6.2 G/DL — LOW (ref 6.6–8.7)
RBC # BLD: 3.25 M/UL — LOW (ref 4.4–5.2)
RBC # FLD: 14.2 % — SIGNIFICANT CHANGE UP (ref 11–15.6)
SODIUM SERPL-SCNC: 139 MMOL/L — SIGNIFICANT CHANGE UP (ref 135–145)
WBC # BLD: 7.6 K/UL — SIGNIFICANT CHANGE UP (ref 4.8–10.8)
WBC # FLD AUTO: 7.6 K/UL — SIGNIFICANT CHANGE UP (ref 4.8–10.8)

## 2017-06-14 PROCEDURE — 99222 1ST HOSP IP/OBS MODERATE 55: CPT | Mod: AI,GC

## 2017-06-14 RX ADMIN — OXYCODONE HYDROCHLORIDE 10 MILLIGRAM(S): 5 TABLET ORAL at 23:00

## 2017-06-14 RX ADMIN — CELECOXIB 200 MILLIGRAM(S): 200 CAPSULE ORAL at 06:52

## 2017-06-14 RX ADMIN — Medication 1 TABLET(S): at 11:17

## 2017-06-14 RX ADMIN — FAMOTIDINE 20 MILLIGRAM(S): 10 INJECTION INTRAVENOUS at 06:09

## 2017-06-14 RX ADMIN — OXYCODONE HYDROCHLORIDE 10 MILLIGRAM(S): 5 TABLET ORAL at 07:53

## 2017-06-14 RX ADMIN — Medication 75 MILLIGRAM(S): at 06:08

## 2017-06-14 RX ADMIN — CELECOXIB 200 MILLIGRAM(S): 200 CAPSULE ORAL at 17:53

## 2017-06-14 RX ADMIN — OXYCODONE HYDROCHLORIDE 10 MILLIGRAM(S): 5 TABLET ORAL at 06:52

## 2017-06-14 RX ADMIN — OXYCODONE HYDROCHLORIDE 10 MILLIGRAM(S): 5 TABLET ORAL at 08:48

## 2017-06-14 RX ADMIN — OXYCODONE HYDROCHLORIDE 10 MILLIGRAM(S): 5 TABLET ORAL at 15:50

## 2017-06-14 RX ADMIN — OXYCODONE HYDROCHLORIDE 10 MILLIGRAM(S): 5 TABLET ORAL at 17:53

## 2017-06-14 RX ADMIN — OXYCODONE HYDROCHLORIDE 10 MILLIGRAM(S): 5 TABLET ORAL at 17:54

## 2017-06-14 RX ADMIN — Medication 325 MILLIGRAM(S): at 11:17

## 2017-06-14 RX ADMIN — CELECOXIB 200 MILLIGRAM(S): 200 CAPSULE ORAL at 06:08

## 2017-06-14 RX ADMIN — ENOXAPARIN SODIUM 30 MILLIGRAM(S): 100 INJECTION SUBCUTANEOUS at 06:08

## 2017-06-14 RX ADMIN — Medication 100 MILLIGRAM(S): at 06:09

## 2017-06-14 RX ADMIN — FAMOTIDINE 20 MILLIGRAM(S): 10 INJECTION INTRAVENOUS at 17:52

## 2017-06-14 RX ADMIN — OXYCODONE HYDROCHLORIDE 10 MILLIGRAM(S): 5 TABLET ORAL at 14:47

## 2017-06-14 RX ADMIN — ENOXAPARIN SODIUM 30 MILLIGRAM(S): 100 INJECTION SUBCUTANEOUS at 17:52

## 2017-06-14 RX ADMIN — OXYCODONE HYDROCHLORIDE 10 MILLIGRAM(S): 5 TABLET ORAL at 12:20

## 2017-06-14 RX ADMIN — Medication 1 MILLIGRAM(S): at 11:26

## 2017-06-14 RX ADMIN — CELECOXIB 200 MILLIGRAM(S): 200 CAPSULE ORAL at 17:56

## 2017-06-14 RX ADMIN — OXYCODONE HYDROCHLORIDE 10 MILLIGRAM(S): 5 TABLET ORAL at 11:26

## 2017-06-14 RX ADMIN — OXYCODONE HYDROCHLORIDE 10 MILLIGRAM(S): 5 TABLET ORAL at 19:00

## 2017-06-14 RX ADMIN — SENNA PLUS 2 TABLET(S): 8.6 TABLET ORAL at 21:21

## 2017-06-14 RX ADMIN — Medication 75 MILLIGRAM(S): at 17:52

## 2017-06-14 RX ADMIN — Medication 20 MILLIGRAM(S): at 06:09

## 2017-06-14 RX ADMIN — OXYCODONE HYDROCHLORIDE 10 MILLIGRAM(S): 5 TABLET ORAL at 06:09

## 2017-06-14 RX ADMIN — Medication 100 MILLIGRAM(S): at 14:49

## 2017-06-14 RX ADMIN — VALSARTAN 80 MILLIGRAM(S): 80 TABLET ORAL at 06:08

## 2017-06-14 RX ADMIN — OXYCODONE HYDROCHLORIDE 10 MILLIGRAM(S): 5 TABLET ORAL at 17:56

## 2017-06-14 RX ADMIN — Medication 100 MILLIGRAM(S): at 21:21

## 2017-06-14 RX ADMIN — Medication 300 MILLIGRAM(S): at 11:16

## 2017-06-14 NOTE — PROGRESS NOTE ADULT - SUBJECTIVE AND OBJECTIVE BOX
61 year old female with bilateral knee OA, underwent elective b/l knee arthroplasty on 6/8/17. WBAT on lovenox      INTERVAL SUBJECTIVE & REVIEW OF SYMPTOMS:  No new issues overnight since admission to rehab unit 2. Had BM yesterday after bowel meds yesterday      REVIEW OF SYSTEMS  [ x  ] Constitutional WNL  [  x ] Cardio WNL  [x   ] Resp WNL  [ x  ] GI WNL  [  x ]  WNL  [ x  ] Heme WNL  [  x ] Endo WNL  [  x ] Skin WNL  [   ] MSK WNL  [x   ] Neuro WNL  [ x  ] Cognitive WNL  [x   ] Psych WNL    VITAL SIGNS  Vital Signs Last 24 Hrs  T(C): 36.9, Max: 37.3 (06-13 @ 20:12)  T(F): 98.5, Max: 99.1 (06-13 @ 20:12)  HR: 84 (82 - 84)  BP: 124/73 (122/71 - 124/73)  BP(mean): --  RR: 18 (18 - 18)  SpO2: 97% (97% - 99%)    PHYSICAL EXAM  General: NAD  HEENT: AT,NC  Cardio: S1S2+  Resp: clear  Abdomen: soft  Extrem: Bilateral calf edema +, Knee incisons with honey comb dressing, appear clean      MEDICATIONS  (STANDING):  enoxaparin Injectable 30milliGRAM(s) SubCutaneous every 12 hours  docusate sodium 100milliGRAM(s) Oral three times a day  ferrous    sulfate 325milliGRAM(s) Oral daily  multivitamin 1Tablet(s) Oral daily  valsartan 80milliGRAM(s) Oral daily  senna 2Tablet(s) Oral at bedtime  folic acid 1milliGRAM(s) Oral daily  allopurinol 300milliGRAM(s) Oral daily  famotidine    Tablet 20milliGRAM(s) Oral two times a day  metoprolol 75milliGRAM(s) Oral every 12 hours  celecoxib 200milliGRAM(s) Oral two times a day before meals  oxyCODONE ER Tablet 10milliGRAM(s) Oral every 12 hours  furosemide    Tablet 20milliGRAM(s) Oral daily    MEDICATIONS  (PRN):  acetaminophen   Tablet. 650milliGRAM(s) Oral every 6 hours PRN Mild Pain (1 - 3)  oxyCODONE IR 5milliGRAM(s) Oral every 3 hours PRN Moderate Pain (4 - 6)  oxyCODONE IR 10milliGRAM(s) Oral every 3 hours PRN Severe Pain (7 - 10)  bisacodyl 5milliGRAM(s) Oral every 12 hours PRN Constipation         Functional Exam: Eval today    RECENT LABS:                        10.0   7.6   )-----------( 336      ( 14 Jun 2017 07:07 )             30.3     06-14    139  |  99  |  14.0  ----------------------------<  102  4.4   |  29.0  |  0.62    Ca    9.6      14 Jun 2017 07:07    TPro  6.2<L>  /  Alb  3.5  /  TBili  0.7  /  DBili  x   /  AST  20  /  ALT  21  /  AlkPhos  82  06-14      A/P:    61 year old female with functional deficits following elective bilateral TKA for OA    DVT prophyalxis: on lovenox, Lovenox teaching    LE edema: on lasix    HTN:on valsartan, metoprolol    Gout:on allopurinol    Anemia: on feosol, folic acid    Pain: on oxycontin, celebrex, and oxycodone/tylenol prn    GI prophylaxis: on pepcid    Bowel regime:on colace, senna, duloclax prn    Bladder program: toilet schedule prn    Start full rehab program:PT/OT.

## 2017-06-15 DIAGNOSIS — D50.0 IRON DEFICIENCY ANEMIA SECONDARY TO BLOOD LOSS (CHRONIC): ICD-10-CM

## 2017-06-15 DIAGNOSIS — K59.00 CONSTIPATION, UNSPECIFIED: ICD-10-CM

## 2017-06-15 DIAGNOSIS — I10 ESSENTIAL (PRIMARY) HYPERTENSION: ICD-10-CM

## 2017-06-15 DIAGNOSIS — M17.0 BILATERAL PRIMARY OSTEOARTHRITIS OF KNEE: ICD-10-CM

## 2017-06-15 PROCEDURE — 99232 SBSQ HOSP IP/OBS MODERATE 35: CPT | Mod: GC

## 2017-06-15 PROCEDURE — 99232 SBSQ HOSP IP/OBS MODERATE 35: CPT

## 2017-06-15 RX ORDER — OXYCODONE HYDROCHLORIDE 5 MG/1
5 TABLET ORAL EVERY 4 HOURS
Qty: 0 | Refills: 0 | Status: DISCONTINUED | OUTPATIENT
Start: 2017-06-15 | End: 2017-06-17

## 2017-06-15 RX ORDER — OXYCODONE HYDROCHLORIDE 5 MG/1
10 TABLET ORAL EVERY 12 HOURS
Qty: 0 | Refills: 0 | Status: DISCONTINUED | OUTPATIENT
Start: 2017-06-15 | End: 2017-06-16

## 2017-06-15 RX ORDER — ENOXAPARIN SODIUM 100 MG/ML
30 INJECTION SUBCUTANEOUS
Qty: 10 | Refills: 0 | OUTPATIENT
Start: 2017-06-15 | End: 2017-06-20

## 2017-06-15 RX ORDER — POLYETHYLENE GLYCOL 3350 17 G/17G
17 POWDER, FOR SOLUTION ORAL DAILY
Qty: 0 | Refills: 0 | Status: DISCONTINUED | OUTPATIENT
Start: 2017-06-15 | End: 2017-06-17

## 2017-06-15 RX ORDER — OXYCODONE HYDROCHLORIDE 5 MG/1
10 TABLET ORAL EVERY 4 HOURS
Qty: 0 | Refills: 0 | Status: DISCONTINUED | OUTPATIENT
Start: 2017-06-15 | End: 2017-06-17

## 2017-06-15 RX ADMIN — OXYCODONE HYDROCHLORIDE 10 MILLIGRAM(S): 5 TABLET ORAL at 06:50

## 2017-06-15 RX ADMIN — ENOXAPARIN SODIUM 30 MILLIGRAM(S): 100 INJECTION SUBCUTANEOUS at 17:22

## 2017-06-15 RX ADMIN — OXYCODONE HYDROCHLORIDE 10 MILLIGRAM(S): 5 TABLET ORAL at 00:00

## 2017-06-15 RX ADMIN — CELECOXIB 200 MILLIGRAM(S): 200 CAPSULE ORAL at 07:00

## 2017-06-15 RX ADMIN — Medication 1 TABLET(S): at 12:40

## 2017-06-15 RX ADMIN — CELECOXIB 200 MILLIGRAM(S): 200 CAPSULE ORAL at 17:00

## 2017-06-15 RX ADMIN — VALSARTAN 80 MILLIGRAM(S): 80 TABLET ORAL at 06:51

## 2017-06-15 RX ADMIN — Medication 1 MILLIGRAM(S): at 12:40

## 2017-06-15 RX ADMIN — OXYCODONE HYDROCHLORIDE 10 MILLIGRAM(S): 5 TABLET ORAL at 09:45

## 2017-06-15 RX ADMIN — OXYCODONE HYDROCHLORIDE 10 MILLIGRAM(S): 5 TABLET ORAL at 13:40

## 2017-06-15 RX ADMIN — SENNA PLUS 2 TABLET(S): 8.6 TABLET ORAL at 21:29

## 2017-06-15 RX ADMIN — Medication 100 MILLIGRAM(S): at 14:22

## 2017-06-15 RX ADMIN — OXYCODONE HYDROCHLORIDE 10 MILLIGRAM(S): 5 TABLET ORAL at 16:40

## 2017-06-15 RX ADMIN — Medication 100 MILLIGRAM(S): at 21:29

## 2017-06-15 RX ADMIN — OXYCODONE HYDROCHLORIDE 10 MILLIGRAM(S): 5 TABLET ORAL at 12:40

## 2017-06-15 RX ADMIN — OXYCODONE HYDROCHLORIDE 10 MILLIGRAM(S): 5 TABLET ORAL at 08:46

## 2017-06-15 RX ADMIN — Medication 75 MILLIGRAM(S): at 17:27

## 2017-06-15 RX ADMIN — FAMOTIDINE 20 MILLIGRAM(S): 10 INJECTION INTRAVENOUS at 17:23

## 2017-06-15 RX ADMIN — ENOXAPARIN SODIUM 30 MILLIGRAM(S): 100 INJECTION SUBCUTANEOUS at 06:51

## 2017-06-15 RX ADMIN — CELECOXIB 200 MILLIGRAM(S): 200 CAPSULE ORAL at 15:51

## 2017-06-15 RX ADMIN — Medication 100 MILLIGRAM(S): at 06:50

## 2017-06-15 RX ADMIN — FAMOTIDINE 20 MILLIGRAM(S): 10 INJECTION INTRAVENOUS at 06:50

## 2017-06-15 RX ADMIN — OXYCODONE HYDROCHLORIDE 10 MILLIGRAM(S): 5 TABLET ORAL at 23:08

## 2017-06-15 RX ADMIN — OXYCODONE HYDROCHLORIDE 10 MILLIGRAM(S): 5 TABLET ORAL at 07:00

## 2017-06-15 RX ADMIN — OXYCODONE HYDROCHLORIDE 10 MILLIGRAM(S): 5 TABLET ORAL at 19:36

## 2017-06-15 RX ADMIN — Medication 325 MILLIGRAM(S): at 12:40

## 2017-06-15 RX ADMIN — OXYCODONE HYDROCHLORIDE 10 MILLIGRAM(S): 5 TABLET ORAL at 17:27

## 2017-06-15 RX ADMIN — CELECOXIB 200 MILLIGRAM(S): 200 CAPSULE ORAL at 06:51

## 2017-06-15 RX ADMIN — Medication 300 MILLIGRAM(S): at 12:39

## 2017-06-15 RX ADMIN — Medication 75 MILLIGRAM(S): at 06:50

## 2017-06-15 RX ADMIN — OXYCODONE HYDROCHLORIDE 10 MILLIGRAM(S): 5 TABLET ORAL at 17:28

## 2017-06-15 RX ADMIN — OXYCODONE HYDROCHLORIDE 10 MILLIGRAM(S): 5 TABLET ORAL at 18:57

## 2017-06-15 RX ADMIN — OXYCODONE HYDROCHLORIDE 10 MILLIGRAM(S): 5 TABLET ORAL at 15:49

## 2017-06-15 NOTE — DIETITIAN INITIAL EVALUATION ADULT. - OTHER INFO
Pt s/p BL TKA. Tolerating DASH diet with good >75% PO intake. No other issues at this time. Pt looking to lose weight and get back to [facility], made aware RD remains available for weight loss education.

## 2017-06-15 NOTE — PROGRESS NOTE ADULT - SUBJECTIVE AND OBJECTIVE BOX
HISTORY OF PRESENT ILLNESS  61 year old female with a PMHx noted below who presented to [facility] on 6/8 for elective b/L TKR. She has had ongoing bilateral knee osteoarthritis which failed conservative management (viscosupplementation and therapy). She required the use of a SAC intermittently over the last year. Underwent b/L TKR on  6/8 by Dr. Harrell with EBL = 50cc. No intra-operative complications. Post-operatively placed on Lovenox for DVT PPx. Also noted to have a single episode of fever on POD#2 - believed to be reactive. Pain controlled on Oxycodone PRN and Tylenol PRN.    PMHx: Hypertension, thyroid nodule - benign, lumbar radiculopathy, gout    TODAY'S SUBJECTIVE & REVIEW OF SYMPTOMS  [X] Constiutional WNL            [X] Cardio WNL               [X] Resp WNL  [X] GI WNL                           [X]  WNL                    [X] Heme WNL  [X] Endo WNL                       [  ] Skin WNL                  [  ] MSK WNL  [X] Neuro WNL                      [X] Cognitive WNL            [X] Psych WNL    Patient seen and examined. No acute events overnight.  Reports that progressing well with therapy.  Discussed reducing pain medication with patient.  Will DC Oxycontin tomorrow and switch Oxycodone to Q4.  Hep lock removed today.  Will send Lovenox to patient's pharmacy today.     VITALS  T(C): 37.2  T(F): 98.9, Max: 98.9 (06-14 @ 19:56)  HR: 75 (75 - 91)  BP: 118/67 (106/70 - 156/86)  RR:  (18 - 18)  SpO2:  (97% - 99%)  Wt(kg): --    PHYSICAL EXAM  Constitutional - NAD, Comfortable  HEENT - NCAT, EOMI  Chest - CTA bilaterally, No wheeze, No rhonchi, No crackles  Cardiovascular - RRR, S1S2  Abdomen - Obese, BS+, Soft, NTND  Extremities - L>R LE edema, No calf tenderness   Neurologic Exam -                    Cognitive - Awake, Alert, AAO to self, place, date, year, situation     Motor - Limited ROM b/L knee flexion/extension                    B/L UE - 5/5                     LEFT    LE - HF 2/5, KE 2/5, DF 5/5, PF 5/5                    RIGHT LE - HF 2/5, KE 2/5, DF 5/5, PF 5/5        Sensory - Intact to LT  Psychiatric - Mood stable, Affect WNL    FUNCTIONAL STATUS  Gait -   Transfers -   ADL's -   Functional Transfers -    RECENT LABS/IMAGING             10.0   7.6   )-----------( 336      ( 14 Jun 2017 07:07 )             30.3      139  |  99  |  14.0  ----------------------------<  102   ( 14 June 2017)  4.4   |  29.0  |  0.62    Ca    9.6      14 Jun 2017 07:07    TPro  6.2<L>  /  Alb  3.5  /  TBili  0.7  /  DBili  x   /  AST  20  /  ALT  21  /  AlkPhos  82  06-14    RADIOLOGY/OTHER RESULTS  -----    CURRENT MEDICATIONS   MEDICATIONS  (STANDING):  enoxaparin Injectable 30milliGRAM(s) SubCutaneous every 12 hours  docusate sodium 100milliGRAM(s) Oral three times a day  ferrous    sulfate 325milliGRAM(s) Oral daily  multivitamin 1Tablet(s) Oral daily  valsartan 80milliGRAM(s) Oral daily  senna 2Tablet(s) Oral at bedtime  folic acid 1milliGRAM(s) Oral daily  allopurinol 300milliGRAM(s) Oral daily  famotidine    Tablet 20milliGRAM(s) Oral two times a day  metoprolol 75milliGRAM(s) Oral every 12 hours  celecoxib 200milliGRAM(s) Oral two times a day before meals  oxyCODONE ER Tablet 10milliGRAM(s) Oral every 12 hours  furosemide    Tablet 20milliGRAM(s) Oral daily    MEDICATIONS  (PRN):  acetaminophen   Tablet. 650milliGRAM(s) Oral every 6 hours PRN Mild Pain (1 - 3)  oxyCODONE IR 5milliGRAM(s) Oral every 3 hours PRN Moderate Pain (4 - 6)  oxyCODONE IR 10milliGRAM(s) Oral every 3 hours PRN Severe Pain (7 - 10)  bisacodyl 5milliGRAM(s) Oral every 12 hours PRN Constipation  polyethylene glycol 3350 17Gram(s) Oral daily PRN Constipation      ASSESSMENT/PLAN  61 year old female with functional deficits following elective bilateral TKA for OA    Anemia - Iron, Hb stable, monitor, MVI, Folate  B/L LE Edema - Lasix, ACE  HTN - Lopressor, Valsartan  Gout - Allupurinol  GI/Bowel management - Pepcid, Colace, Senna, Dulcolax PRN, Miralax PRN   management - Toilet PRN  Skin - Honeycomb dressing b/L knees, Turn Q2  Pain - Tylenol PRN, Change Oxycodone PRN to Q4, Continue Oxycontin until 6/16, Celebrex until 6/18, Ice to b/L knees.  DVT PPx - Lovenox, SCDs  Diet - Reg/thins (Cardiac)    Continue 3 hours of comprehensive therapy, PT/OT. Plans for discharge to home on 6/18. HISTORY OF PRESENT ILLNESS  61 year old female with a PMHx noted below who presented to [facility] on 6/8 for elective b/L TKR. She has had ongoing bilateral knee osteoarthritis which failed conservative management (viscosupplementation and therapy). She required the use of a SAC intermittently over the last year. Underwent b/L TKR on  6/8 by Dr. Harrell with EBL = 50cc. No intra-operative complications. Post-operatively placed on Lovenox for DVT PPx. Also noted to have a single episode of fever on POD#2 - believed to be reactive. Pain controlled on Oxycodone PRN and Tylenol PRN.    PMHx: Hypertension, thyroid nodule - benign, lumbar radiculopathy, gout    TODAY'S SUBJECTIVE & REVIEW OF SYMPTOMS  [X] Constiutional WNL            [X] Cardio WNL               [X] Resp WNL  [X] GI WNL                           [X]  WNL                    [X] Heme WNL  [X] Endo WNL                       [  ] Skin WNL                  [  ] MSK WNL  [X] Neuro WNL                      [X] Cognitive WNL            [X] Psych WNL    Patient seen and examined. No acute events overnight.  Reports that progressing well with therapy.  Discussed reducing pain medication with patient.  Will DC Oxycontin tomorrow and switch Oxycodone to Q4.  Hep lock removed today.  Will send Lovenox to patient's pharmacy today.     VITALS  T(C): 37.2  T(F): 98.9, Max: 98.9 (06-14 @ 19:56)  HR: 75 (75 - 91)  BP: 118/67 (106/70 - 156/86)  RR:  (18 - 18)  SpO2:  (97% - 99%)  Wt(kg): --    PHYSICAL EXAM  Constitutional - NAD, Comfortable  HEENT - NCAT, EOMI  Chest - CTA bilaterally, No wheeze, No rhonchi, No crackles  Cardiovascular - RRR, S1S2  Abdomen - Obese, BS+, Soft, NTND  Extremities - L>R LE edema, No calf tenderness   Neurologic Exam -                    Cognitive - Awake, Alert, AAO to self, place, date, year, situation     Motor - Limited ROM b/L knee flexion/extension                    B/L UE - 5/5                     LEFT    LE - HF 2/5, KE 2/5, DF 5/5, PF 5/5                    RIGHT LE - HF 2/5, KE 2/5, DF 5/5, PF 5/5        Sensory - Intact to LT  Psychiatric - Mood stable, Affect WNL    FUNCTIONAL STATUS  Gait -   Transfers -   ADL's -   Functional Transfers -    RECENT LABS/IMAGING             10.0   7.6   )-----------( 336      ( 14 Jun 2017 07:07 )             30.3      139  |  99  |  14.0  ----------------------------<  102   ( 14 June 2017)  4.4   |  29.0  |  0.62    Ca    9.6      14 Jun 2017 07:07    TPro  6.2<L>  /  Alb  3.5  /  TBili  0.7  /  DBili  x   /  AST  20  /  ALT  21  /  AlkPhos  82  06-14    Prealb 6/14 - 15    RADIOLOGY/OTHER RESULTS  -----    CURRENT MEDICATIONS   MEDICATIONS  (STANDING):  enoxaparin Injectable 30milliGRAM(s) SubCutaneous every 12 hours  docusate sodium 100milliGRAM(s) Oral three times a day  ferrous    sulfate 325milliGRAM(s) Oral daily  multivitamin 1Tablet(s) Oral daily  valsartan 80milliGRAM(s) Oral daily  senna 2Tablet(s) Oral at bedtime  folic acid 1milliGRAM(s) Oral daily  allopurinol 300milliGRAM(s) Oral daily  famotidine    Tablet 20milliGRAM(s) Oral two times a day  metoprolol 75milliGRAM(s) Oral every 12 hours  celecoxib 200milliGRAM(s) Oral two times a day before meals  furosemide    Tablet 20milliGRAM(s) Oral daily  oxyCODONE ER Tablet 10milliGRAM(s) Oral every 12 hours    MEDICATIONS  (PRN):  acetaminophen   Tablet. 650milliGRAM(s) Oral every 6 hours PRN Mild Pain (1 - 3)  bisacodyl 5milliGRAM(s) Oral every 12 hours PRN Constipation  polyethylene glycol 3350 17Gram(s) Oral daily PRN Constipation  oxyCODONE IR 5milliGRAM(s) Oral every 4 hours PRN Moderate Pain (4 - 6)  oxyCODONE IR 10milliGRAM(s) Oral every 4 hours PRN Severe Pain (7 - 10)    ASSESSMENT/PLAN  61 year old female with functional deficits following elective bilateral TKA for OA    Anemia - Iron, Hb stable, monitor, MVI, Folate  B/L LE Edema - Lasix, ACE  HTN - Lopressor, Valsartan  Gout - Allopurinol  GI/Bowel management - Pepcid, Colace, Senna, Dulcolax PRN, Miralax PRN   management - Toilet PRN  Skin - Honeycomb dressing b/L knees, Turn Q2  Pain - Tylenol PRN, Change Oxycodone PRN to Q4, Continue Oxycontin until 6/16, Celebrex until 6/18, Ice to b/L knees.  DVT PPx - Lovenox, SCDs  Protein malnutrition/Diet - Reg/thins (Cardiac), Ensure TID    Continue 3 hours of comprehensive therapy, PT/OT. Plans for discharge to home on 6/18. HISTORY OF PRESENT ILLNESS  61 year old female with a PMHx noted below who presented to [facility] on 6/8 for elective b/L TKR. She has had ongoing bilateral knee osteoarthritis which failed conservative management (viscosupplementation and therapy). She required the use of a SAC intermittently over the last year. Underwent b/L TKR on  6/8 by Dr. Harrell with EBL = 50cc. No intra-operative complications. Post-operatively placed on Lovenox for DVT PPx. Also noted to have a single episode of fever on POD#2 - believed to be reactive. Pain controlled on Oxycodone PRN and Tylenol PRN.    PMHx: Hypertension, thyroid nodule - benign, lumbar radiculopathy, gout    TODAY'S SUBJECTIVE & REVIEW OF SYMPTOMS  [X] Constiutional WNL            [X] Cardio WNL               [X] Resp WNL  [X] GI WNL                           [X]  WNL                    [X] Heme WNL  [X] Endo WNL                       [  ] Skin WNL                  [  ] MSK WNL  [X] Neuro WNL                      [X] Cognitive WNL            [X] Psych WNL    Patient seen and examined. No acute events overnight.  Reports that progressing well with therapy.  Discussed reducing pain medication with patient.  Will DC Oxycontin tomorrow and switch Oxycodone to Q4.  Hep lock removed today.  Will send Lovenox to patient's pharmacy today.     VITALS  T(C): 37.2  T(F): 98.9, Max: 98.9 (06-14 @ 19:56)  HR: 75 (75 - 91)  BP: 118/67 (106/70 - 156/86)  RR:  (18 - 18)  SpO2:  (97% - 99%)  Wt(kg): --    PHYSICAL EXAM  Constitutional - NAD, Comfortable  HEENT - NCAT, EOMI  Chest - CTA bilaterally, No wheeze, No rhonchi, No crackles  Cardiovascular - RRR, S1S2  Abdomen - Obese, BS+, Soft, NTND  Extremities - L>R LE edema, No calf tenderness   Neurologic Exam -                    Cognitive - Awake, Alert, AAO to self, place, date, year, situation     Motor - Limited ROM b/L knee flexion/extension                    B/L UE - 5/5                     LEFT    LE - HF 2/5, KE 2/5, DF 5/5, PF 5/5                    RIGHT LE - HF 2/5, KE 2/5, DF 5/5, PF 5/5        Sensory - Intact to LT  Psychiatric - Mood stable, Affect WNL    FUNCTIONAL STATUS  Gait - min assist  Transfers - min assist   ADL's - min assist   Functional Transfers -min assist    RECENT LABS/IMAGING             10.0   7.6   )-----------( 336      ( 14 Jun 2017 07:07 )             30.3      139  |  99  |  14.0  ----------------------------<  102   ( 14 June 2017)  4.4   |  29.0  |  0.62    Ca    9.6      14 Jun 2017 07:07    TPro  6.2<L>  /  Alb  3.5  /  TBili  0.7  /  DBili  x   /  AST  20  /  ALT  21  /  AlkPhos  82  06-14    Prealb 6/14 - 15    RADIOLOGY/OTHER RESULTS  -----    CURRENT MEDICATIONS   MEDICATIONS  (STANDING):  enoxaparin Injectable 30milliGRAM(s) SubCutaneous every 12 hours  docusate sodium 100milliGRAM(s) Oral three times a day  ferrous    sulfate 325milliGRAM(s) Oral daily  multivitamin 1Tablet(s) Oral daily  valsartan 80milliGRAM(s) Oral daily  senna 2Tablet(s) Oral at bedtime  folic acid 1milliGRAM(s) Oral daily  allopurinol 300milliGRAM(s) Oral daily  famotidine    Tablet 20milliGRAM(s) Oral two times a day  metoprolol 75milliGRAM(s) Oral every 12 hours  celecoxib 200milliGRAM(s) Oral two times a day before meals  furosemide    Tablet 20milliGRAM(s) Oral daily  oxyCODONE ER Tablet 10milliGRAM(s) Oral every 12 hours    MEDICATIONS  (PRN):  acetaminophen   Tablet. 650milliGRAM(s) Oral every 6 hours PRN Mild Pain (1 - 3)  bisacodyl 5milliGRAM(s) Oral every 12 hours PRN Constipation  polyethylene glycol 3350 17Gram(s) Oral daily PRN Constipation  oxyCODONE IR 5milliGRAM(s) Oral every 4 hours PRN Moderate Pain (4 - 6)  oxyCODONE IR 10milliGRAM(s) Oral every 4 hours PRN Severe Pain (7 - 10)    ASSESSMENT/PLAN  61 year old female with functional deficits following elective bilateral TKA for OA    Anemia - Iron, Hb stable, monitor, MVI, Folate  B/L LE Edema - Lasix, ACE  HTN - Lopressor, Valsartan  Gout - Allopurinol  GI/Bowel management - Pepcid, Colace, Senna, Dulcolax PRN, Miralax PRN   management - Toilet PRN  Skin - Honeycomb dressing b/L knees, Turn Q2  Pain - Tylenol PRN, Change Oxycodone PRN to Q4, Continue Oxycontin until 6/16, Celebrex until 6/18, Ice to b/L knees.  DVT PPx - Lovenox, SCDs  Protein malnutrition/Diet - Reg/thins (Cardiac), Ensure TID    Continue 3 hours of comprehensive therapy, PT/OT. Plans for discharge to home on 6/18.

## 2017-06-15 NOTE — PROGRESS NOTE ADULT - ATTENDING COMMENTS
States that she is doing well. increased foot swelling, had a small dry blister area on left shin much below the honey comb dressing.  Pain controlled with current regimen. D/C oxycontin in am. Oxycodone changed to q4h today.     Incisions with honey comb dressing : clean. Continue full rehab program States that she is doing well. increased foot swelling, had a small dry blister area on left shin much below the honey comb dressing.  Pain controlled with current regimen. D/C oxycontin in am. Oxycodone changed to q4h today.     Incisions with honey comb dressing : clean. Continue full rehab program    Team conference today: plan for discharge home 6/18

## 2017-06-15 NOTE — PROGRESS NOTE ADULT - SUBJECTIVE AND OBJECTIVE BOX
CC: S/p bilateral TKA    HPI: 60 yo female with PMH HTN, arthritis bilateral knee worsening over years, s/p b/l TKA , with post op hypotension requiring iv hydration, post op anemia   and constipation, now in rehab.     INTERVAL HPI/OVERNIGHT EVENTS: Patient seen and examined sitting in wheelchair.  Patient complaining of bilateral knee pain controlled with pain medications.  Constipation resolved, (+) BM 6/14/17.  Patient denies any headache, dizziness, SOB, CP, abdominal pain, nausea, vomiting, dysuria.  Tolerating therapy.  Other ROS reviewed and are negative.    Vital Signs Last 24 Hrs  T(C): 36.7, Max: 37.3 (06-14 @ 11:00)  T(F): 98, Max: 99.2 (06-14 @ 11:00)  HR: 77 (77 - 91)  BP: 133/74 (106/70 - 156/86)  BP(mean): --  RR: 18 (18 - 18)  SpO2: 98% (97% - 100%)  I&O's Detail    PHYSICAL EXAM:  GENERAL: NAD, well-groomed, well-developed  HEAD:  Atraumatic, Normocephalic  EYES: EOMI, PERRLA, conjunctiva and sclera clear  NECK: Supple, No JVD, Normal thyroid  NERVOUS SYSTEM:  Alert & Oriented X3, Good concentration; Motor Strength 5/5 B/L upper and lower extremities  CHEST/LUNG: Clear to auscultation bilaterally; No rales, rhonchi, wheezing, or rubs  HEART: Regular rate and rhythm; No murmurs, rubs, or gallops  ABDOMEN: Soft, Nontender, Nondistended; Bowel sounds present  EXTREMITIES:  2+ Peripheral Pulses, No clubbing or cyanosis, bilateral ankle edema; bilateral knees with clean dressings  SKIN: No rashes or lesions                              10.0   7.6   )-----------( 336      ( 14 Jun 2017 07:07 )             30.3     14 Jun 2017 07:07    139    |  99     |  14.0   ----------------------------<  102    4.4     |  29.0   |  0.62     Ca    9.6        14 Jun 2017 07:07    TPro  6.2    /  Alb  3.5    /  TBili  0.7    /  DBili  x      /  AST  20     /  ALT  21     /  AlkPhos  82     14 Jun 2017 07:07        LIVER FUNCTIONS - ( 14 Jun 2017 07:07 )  Alb: 3.5 g/dL / Pro: 6.2 g/dL / ALK PHOS: 82 U/L / ALT: 21 U/L / AST: 20 U/L / GGT: x               MEDICATIONS  (STANDING):  enoxaparin Injectable 30milliGRAM(s) SubCutaneous every 12 hours  docusate sodium 100milliGRAM(s) Oral three times a day  ferrous    sulfate 325milliGRAM(s) Oral daily  multivitamin 1Tablet(s) Oral daily  valsartan 80milliGRAM(s) Oral daily  senna 2Tablet(s) Oral at bedtime  folic acid 1milliGRAM(s) Oral daily  allopurinol 300milliGRAM(s) Oral daily  famotidine    Tablet 20milliGRAM(s) Oral two times a day  metoprolol 75milliGRAM(s) Oral every 12 hours  celecoxib 200milliGRAM(s) Oral two times a day before meals  oxyCODONE ER Tablet 10milliGRAM(s) Oral every 12 hours  furosemide    Tablet 20milliGRAM(s) Oral daily    MEDICATIONS  (PRN):  acetaminophen   Tablet. 650milliGRAM(s) Oral every 6 hours PRN Mild Pain (1 - 3)  oxyCODONE IR 5milliGRAM(s) Oral every 3 hours PRN Moderate Pain (4 - 6)  oxyCODONE IR 10milliGRAM(s) Oral every 3 hours PRN Severe Pain (7 - 10)  bisacodyl 5milliGRAM(s) Oral every 12 hours PRN Constipation

## 2017-06-15 NOTE — PROGRESS NOTE ADULT - ASSESSMENT
60 yo female with PMH HTN, arthritis bilateral knee worsening over years, s/p b/l TKA , with post op hypotension requiring iv hydration, post op anemia   and constipation, now in rehab.

## 2017-06-16 PROCEDURE — 99232 SBSQ HOSP IP/OBS MODERATE 35: CPT | Mod: GC

## 2017-06-16 RX ADMIN — OXYCODONE HYDROCHLORIDE 10 MILLIGRAM(S): 5 TABLET ORAL at 23:47

## 2017-06-16 RX ADMIN — ENOXAPARIN SODIUM 30 MILLIGRAM(S): 100 INJECTION SUBCUTANEOUS at 06:22

## 2017-06-16 RX ADMIN — VALSARTAN 80 MILLIGRAM(S): 80 TABLET ORAL at 06:21

## 2017-06-16 RX ADMIN — FAMOTIDINE 20 MILLIGRAM(S): 10 INJECTION INTRAVENOUS at 17:18

## 2017-06-16 RX ADMIN — OXYCODONE HYDROCHLORIDE 10 MILLIGRAM(S): 5 TABLET ORAL at 00:00

## 2017-06-16 RX ADMIN — OXYCODONE HYDROCHLORIDE 10 MILLIGRAM(S): 5 TABLET ORAL at 07:19

## 2017-06-16 RX ADMIN — Medication 300 MILLIGRAM(S): at 12:25

## 2017-06-16 RX ADMIN — OXYCODONE HYDROCHLORIDE 10 MILLIGRAM(S): 5 TABLET ORAL at 06:19

## 2017-06-16 RX ADMIN — OXYCODONE HYDROCHLORIDE 10 MILLIGRAM(S): 5 TABLET ORAL at 13:30

## 2017-06-16 RX ADMIN — OXYCODONE HYDROCHLORIDE 10 MILLIGRAM(S): 5 TABLET ORAL at 17:18

## 2017-06-16 RX ADMIN — CELECOXIB 200 MILLIGRAM(S): 200 CAPSULE ORAL at 06:20

## 2017-06-16 RX ADMIN — Medication 75 MILLIGRAM(S): at 17:18

## 2017-06-16 RX ADMIN — Medication 100 MILLIGRAM(S): at 13:34

## 2017-06-16 RX ADMIN — OXYCODONE HYDROCHLORIDE 10 MILLIGRAM(S): 5 TABLET ORAL at 12:25

## 2017-06-16 RX ADMIN — CELECOXIB 200 MILLIGRAM(S): 200 CAPSULE ORAL at 17:53

## 2017-06-16 RX ADMIN — CELECOXIB 200 MILLIGRAM(S): 200 CAPSULE ORAL at 07:20

## 2017-06-16 RX ADMIN — Medication 325 MILLIGRAM(S): at 12:25

## 2017-06-16 RX ADMIN — Medication 75 MILLIGRAM(S): at 06:21

## 2017-06-16 RX ADMIN — Medication 1 TABLET(S): at 12:25

## 2017-06-16 RX ADMIN — Medication 100 MILLIGRAM(S): at 21:50

## 2017-06-16 RX ADMIN — CELECOXIB 200 MILLIGRAM(S): 200 CAPSULE ORAL at 17:18

## 2017-06-16 RX ADMIN — ENOXAPARIN SODIUM 30 MILLIGRAM(S): 100 INJECTION SUBCUTANEOUS at 17:17

## 2017-06-16 RX ADMIN — Medication 1 MILLIGRAM(S): at 12:25

## 2017-06-16 RX ADMIN — Medication 100 MILLIGRAM(S): at 06:22

## 2017-06-16 RX ADMIN — FAMOTIDINE 20 MILLIGRAM(S): 10 INJECTION INTRAVENOUS at 06:22

## 2017-06-16 RX ADMIN — SENNA PLUS 2 TABLET(S): 8.6 TABLET ORAL at 21:50

## 2017-06-16 RX ADMIN — OXYCODONE HYDROCHLORIDE 10 MILLIGRAM(S): 5 TABLET ORAL at 09:12

## 2017-06-16 RX ADMIN — OXYCODONE HYDROCHLORIDE 10 MILLIGRAM(S): 5 TABLET ORAL at 08:12

## 2017-06-16 NOTE — PROGRESS NOTE ADULT - ATTENDING COMMENTS
No new issues overnight, ambulating with min assist/supervision with rw.   left shin small blister well healed.  Discussed again regarding d/c of oxycontin. Percocet script already prescibed by ortho and dispensed per ISTOP review. Hence none will be ordered by us.  Incisions clean, -Dressing to be removed tomorrow.   Lovenox teaching on going    d/c home 6/18

## 2017-06-16 NOTE — PROGRESS NOTE ADULT - ASSESSMENT
62 yo female with PMH HTN, arthritis bilateral knee worsening over years, s/p b/l TKA , with post op hypotension requiring iv hydration, post op anemia   and constipation, now in rehab.

## 2017-06-16 NOTE — PROGRESS NOTE ADULT - SUBJECTIVE AND OBJECTIVE BOX
HISTORY OF PRESENT ILLNESS  61 year old female with a PMHx noted below who presented to [facility] on 6/8 for elective b/L TKR. She has had ongoing bilateral knee osteoarthritis which failed conservative management (viscosupplementation and therapy). She required the use of a SAC intermittently over the last year. Underwent b/L TKR on  6/8 by Dr. Harrell with EBL = 50cc. No intra-operative complications. Post-operatively placed on Lovenox for DVT PPx. Also noted to have a single episode of fever on POD#2 - believed to be reactive. Pain controlled on Oxycodone PRN and Tylenol PRN.    PMHx: Hypertension, thyroid nodule - benign, lumbar radiculopathy, gout    TODAY'S SUBJECTIVE & REVIEW OF SYMPTOMS  [X] Constiutional WNL            [X] Cardio WNL               [X] Resp WNL  [X] GI WNL                           [X]  WNL                    [X] Heme WNL  [X] Endo WNL                       [  ] Skin WNL                  [  ] MSK WNL  [X] Neuro WNL                      [X] Cognitive WNL            [X] Psych WNL    Patient seen and examined. No acute events overnight.  Reports that she has left lateral and posterior thigh pain.  Previously right knee was worse than left.  Made aware that likely source of pain is post-op, change body mechanics, and therapy.  Encouraged to continue to use Ice. Discussed cutting back on pain medications.  DC Oxycontin today. Oxycodone changed to Q4.  Swelling in b/L LE slowly improving, encouraged to do ankle pumps.  To remove b/L knee dressings tomorrow.  Reports not having a bowel movement for a few days, does not feel uncomfortable but asking to have Miralax PRN.    VITALS  T(C): 36.8  T(F): 98.3, Max: 99.1 (06-15 @ 20:47)  HR: 80 (74 - 83)  BP: 116/65 (116/65 - 124/75)  RR:  (18 - 18)  SpO2:  (95% - 100%)  Wt(kg): --    PHYSICAL EXAM  Constitutional - NAD, Comfortable  HEENT - NCAT, EOMI  Chest - CTA bilaterally, No wheeze, No rhonchi, No crackles  Cardiovascular - RRR, S1S2  Abdomen - Obese, BS+, Soft, NTND  Extremities - L>R LE edema, No calf tenderness   Neurologic Exam -                    Cognitive - Awake, Alert, AAO to self, place, date, year, situation     Motor - Limited ROM b/L knee flexion/extension                    B/L UE - 5/5                     LEFT    LE - HF 2/5, KE 2/5, DF 5/5, PF 5/5                    RIGHT LE - HF 2/5, KE 2/5, DF 5/5, PF 5/5        Sensory - Intact to LT  Psychiatric - Mood stable, Affect WNL  Wounds - B/L knee honeycomb dressings CDI    FUNCTIONAL STATUS  Gait - 25' using RW requiring min A, negotiating four 6" stairs using 2HR  Transfers - sit-to-stand mod A  ADL's - grooming supervision, toileting min A  Functional Transfers - min A    RECENT LABS/IMAGING             10.0   7.6   )-----------( 336      ( 14 Jun 2017 07:07 )             30.3      139  |  99  |  14.0  ----------------------------<  102   ( 14 June 2017)  4.4   |  29.0  |  0.62    Ca    9.6      14 Jun 2017 07:07    TPro  6.2<L>  /  Alb  3.5  /  TBili  0.7  /  DBili  x   /  AST  20  /  ALT  21  /  AlkPhos  82  06-14    Prealb 6/14 - 15    RADIOLOGY/OTHER RESULTS  -----    CURRENT MEDICATIONS   MEDICATIONS  (STANDING):  enoxaparin Injectable 30milliGRAM(s) SubCutaneous every 12 hours  docusate sodium 100milliGRAM(s) Oral three times a day  ferrous    sulfate 325milliGRAM(s) Oral daily  multivitamin 1Tablet(s) Oral daily  valsartan 80milliGRAM(s) Oral daily  senna 2Tablet(s) Oral at bedtime  folic acid 1milliGRAM(s) Oral daily  allopurinol 300milliGRAM(s) Oral daily  famotidine    Tablet 20milliGRAM(s) Oral two times a day  metoprolol 75milliGRAM(s) Oral every 12 hours  celecoxib 200milliGRAM(s) Oral two times a day before meals  furosemide    Tablet 20milliGRAM(s) Oral daily    MEDICATIONS  (PRN):  acetaminophen   Tablet. 650milliGRAM(s) Oral every 6 hours PRN Mild Pain (1 - 3)  bisacodyl 5milliGRAM(s) Oral every 12 hours PRN Constipation  polyethylene glycol 3350 17Gram(s) Oral daily PRN Constipation  oxyCODONE IR 5milliGRAM(s) Oral every 4 hours PRN Moderate Pain (4 - 6)  oxyCODONE IR 10milliGRAM(s) Oral every 4 hours PRN Severe Pain (7 - 10)    ASSESSMENT/PLAN  61 year old female with functional deficits following elective bilateral TKA for OA    Anemia - Hb stable, Iron, MVI, Folate, monitor  B/L LE Edema - Lasix, Elevate  HTN - Lopressor, Valsartan  Gout - Allopurinol  GI/Bowel management - Pepcid, Colace, Senna, Dulcolax PRN, Miralax PRN   management - Toilet PRN  Skin - Honeycomb dressing b/L knees until 6/17, Turn Q2  Pain - Tylenol PRN, Oxycodone Q4 PRN (changed from Q3 6/15), DC Oxycontin (6/16), Celebrex until 6/18, Ice to b/L knees.  DVT PPx - Lovenox, SCDs  Protein malnutrition/Diet - Reg/thins (Cardiac), Ensure TID    Continue 3 hours of comprehensive therapy, PT/OT. Plans for discharge to home on 6/18.

## 2017-06-16 NOTE — PROGRESS NOTE ADULT - PROBLEM SELECTOR PLAN 2
Continue Metoprolol, Diovan, and Lasix with parameters. controlled   Continue Metoprolol, Diovan, and Lasix with parameters.

## 2017-06-16 NOTE — PROGRESS NOTE ADULT - SUBJECTIVE AND OBJECTIVE BOX
CC: F/u bilateral TKA    HPI: 60 yo female with PMH HTN, arthritis bilateral knee worsening over years, s/p b/l TKA , with post op hypotension requiring iv hydration, post op anemia   and constipation, now in rehab.     INTERVAL HPI/OVERNIGHT EVENTS: Patient seen and examined sitting in wheelchair eating breakfast.  Pain controlled.  Not sleeping well at night secondary to roommate's family.  Patient denies any headache, dizziness, SOB, CP, abdominal pain, nausea, vomiting, dysuria.  (+) BM 6/14/17    Vital Signs Last 24 Hrs  T(C): 36.8, Max: 37.3 (06-15 @ 20:47)  T(F): 98.3, Max: 99.1 (06-15 @ 20:47)  HR: 80 (74 - 83)  BP: 116/65 (116/65 - 124/75)  BP(mean): --  RR: 18 (18 - 18)  SpO2: 97% (95% - 100%)  I&O's Detail  I & Os for 24h ending 16 Jun 2017 07:00  =============================================  IN:    Total IN: 0 ml  ---------------------------------------------  OUT:    Voided: 1 ml    Total OUT: 1 ml  ---------------------------------------------  Total NET: -1 ml    I & Os for current day (as of 16 Jun 2017 09:39)  =============================================  IN:    Total IN: 0 ml  ---------------------------------------------  OUT:    Voided: 1 ml    Total OUT: 1 ml  ---------------------------------------------  Total NET: -1 ml    PHYSICAL EXAM:  GENERAL: NAD, well-groomed, well-developed  HEAD:  Atraumatic, Normocephalic  EYES: EOMI, PERRLA, conjunctiva and sclera clear  NECK: Supple, No JVD, Normal thyroid  NERVOUS SYSTEM:  Alert & Oriented X3, Good concentration; Motor Strength 5/5 B/L upper and lower extremities  CHEST/LUNG: Clear to auscultation bilaterally; No rales, rhonchi, wheezing, or rubs  HEART: Regular rate and rhythm; No murmurs, rubs, or gallops  ABDOMEN: Soft, Nontender, Nondistended; Bowel sounds present  EXTREMITIES:  2+ Peripheral Pulses, No clubbing or cyanosis; Bilateral knees with clean dressing  SKIN: No rashes or lesions        MEDICATIONS  (STANDING):  enoxaparin Injectable 30milliGRAM(s) SubCutaneous every 12 hours  docusate sodium 100milliGRAM(s) Oral three times a day  ferrous    sulfate 325milliGRAM(s) Oral daily  multivitamin 1Tablet(s) Oral daily  valsartan 80milliGRAM(s) Oral daily  senna 2Tablet(s) Oral at bedtime  folic acid 1milliGRAM(s) Oral daily  allopurinol 300milliGRAM(s) Oral daily  famotidine    Tablet 20milliGRAM(s) Oral two times a day  metoprolol 75milliGRAM(s) Oral every 12 hours  celecoxib 200milliGRAM(s) Oral two times a day before meals  furosemide    Tablet 20milliGRAM(s) Oral daily    MEDICATIONS  (PRN):  acetaminophen   Tablet. 650milliGRAM(s) Oral every 6 hours PRN Mild Pain (1 - 3)  bisacodyl 5milliGRAM(s) Oral every 12 hours PRN Constipation  polyethylene glycol 3350 17Gram(s) Oral daily PRN Constipation  oxyCODONE IR 5milliGRAM(s) Oral every 4 hours PRN Moderate Pain (4 - 6)  oxyCODONE IR 10milliGRAM(s) Oral every 4 hours PRN Severe Pain (7 - 10) CC: F/u bilateral TKA      INTERVAL HPI/OVERNIGHT EVENTS: Patient seen and examined sitting in wheelchair eating breakfast.  Pain controlled.  Not sleeping well at night secondary to roommate's family.  Patient denies any headache, dizziness, SOB, CP, abdominal pain, nausea, vomiting, dysuria.  (+) BM 6/14/17    Vital Signs Last 24 Hrs  T(C): 36.8, Max: 37.3 (06-15 @ 20:47)  T(F): 98.3, Max: 99.1 (06-15 @ 20:47)  HR: 80 (74 - 83)  BP: 116/65 (116/65 - 124/75)  BP(mean): --  RR: 18 (18 - 18)  SpO2: 97% (95% - 100%)  I&O's Detail  I & Os for 24h ending 16 Jun 2017 07:00  =============================================  IN:    Total IN: 0 ml  ---------------------------------------------  OUT:    Voided: 1 ml    Total OUT: 1 ml  ---------------------------------------------  Total NET: -1 ml    I & Os for current day (as of 16 Jun 2017 09:39)  =============================================  IN:    Total IN: 0 ml  ---------------------------------------------  OUT:    Voided: 1 ml    Total OUT: 1 ml  ---------------------------------------------  Total NET: -1 ml    PHYSICAL EXAM:  GENERAL: NAD, well-groomed, well-developed  NECK: Supple, No JVD, Normal thyroid  NERVOUS SYSTEM:  Alert & Oriented X3, Good concentration; Motor Strength 5/5 B/L upper and lower extremities  CHEST/LUNG: Clear to auscultation bilaterally; No rales, rhonchi, wheezing, or rubs  HEART: Regular rate and rhythm; No murmurs, rubs, or gallops  ABDOMEN: Soft, Nontender, Nondistended; Bowel sounds present  EXTREMITIES:  2+ Peripheral Pulses, No clubbing or cyanosis; Bilateral knees with clean dressing          MEDICATIONS  (STANDING):  enoxaparin Injectable 30milliGRAM(s) SubCutaneous every 12 hours  docusate sodium 100milliGRAM(s) Oral three times a day  ferrous    sulfate 325milliGRAM(s) Oral daily  multivitamin 1Tablet(s) Oral daily  valsartan 80milliGRAM(s) Oral daily  senna 2Tablet(s) Oral at bedtime  folic acid 1milliGRAM(s) Oral daily  allopurinol 300milliGRAM(s) Oral daily  famotidine    Tablet 20milliGRAM(s) Oral two times a day  metoprolol 75milliGRAM(s) Oral every 12 hours  celecoxib 200milliGRAM(s) Oral two times a day before meals  furosemide    Tablet 20milliGRAM(s) Oral daily    MEDICATIONS  (PRN):  acetaminophen   Tablet. 650milliGRAM(s) Oral every 6 hours PRN Mild Pain (1 - 3)  bisacodyl 5milliGRAM(s) Oral every 12 hours PRN Constipation  polyethylene glycol 3350 17Gram(s) Oral daily PRN Constipation  oxyCODONE IR 5milliGRAM(s) Oral every 4 hours PRN Moderate Pain (4 - 6)  oxyCODONE IR 10milliGRAM(s) Oral every 4 hours PRN Severe Pain (7 - 10)

## 2017-06-16 NOTE — PROGRESS NOTE ADULT - PROBLEM SELECTOR PLAN 1
Pain control.  Continue rehab.    Incentive spirometry.  DVT prophylaxis as per Ortho. Pain controlled better   Continue rehab.    Incentive spirometry.  DVT prophylaxis as per Ortho.

## 2017-06-17 VITALS
HEART RATE: 77 BPM | SYSTOLIC BLOOD PRESSURE: 107 MMHG | OXYGEN SATURATION: 98 % | DIASTOLIC BLOOD PRESSURE: 67 MMHG | RESPIRATION RATE: 17 BRPM | TEMPERATURE: 99 F

## 2017-06-17 PROCEDURE — 99238 HOSP IP/OBS DSCHRG MGMT 30/<: CPT

## 2017-06-17 RX ORDER — ENOXAPARIN SODIUM 100 MG/ML
30 INJECTION SUBCUTANEOUS
Qty: 2 | Refills: 0
Start: 2017-06-17 | End: 2017-06-18

## 2017-06-17 RX ADMIN — CELECOXIB 200 MILLIGRAM(S): 200 CAPSULE ORAL at 08:17

## 2017-06-17 RX ADMIN — OXYCODONE HYDROCHLORIDE 10 MILLIGRAM(S): 5 TABLET ORAL at 08:18

## 2017-06-17 RX ADMIN — Medication 100 MILLIGRAM(S): at 06:32

## 2017-06-17 RX ADMIN — Medication 650 MILLIGRAM(S): at 14:45

## 2017-06-17 RX ADMIN — Medication 300 MILLIGRAM(S): at 11:35

## 2017-06-17 RX ADMIN — VALSARTAN 80 MILLIGRAM(S): 80 TABLET ORAL at 06:32

## 2017-06-17 RX ADMIN — FAMOTIDINE 20 MILLIGRAM(S): 10 INJECTION INTRAVENOUS at 06:32

## 2017-06-17 RX ADMIN — OXYCODONE HYDROCHLORIDE 10 MILLIGRAM(S): 5 TABLET ORAL at 11:35

## 2017-06-17 RX ADMIN — Medication 325 MILLIGRAM(S): at 11:35

## 2017-06-17 RX ADMIN — ENOXAPARIN SODIUM 30 MILLIGRAM(S): 100 INJECTION SUBCUTANEOUS at 06:32

## 2017-06-17 RX ADMIN — Medication 1 TABLET(S): at 11:35

## 2017-06-17 RX ADMIN — OXYCODONE HYDROCHLORIDE 10 MILLIGRAM(S): 5 TABLET ORAL at 12:30

## 2017-06-17 RX ADMIN — OXYCODONE HYDROCHLORIDE 10 MILLIGRAM(S): 5 TABLET ORAL at 00:47

## 2017-06-17 RX ADMIN — Medication 1 MILLIGRAM(S): at 11:35

## 2017-06-17 RX ADMIN — OXYCODONE HYDROCHLORIDE 10 MILLIGRAM(S): 5 TABLET ORAL at 06:32

## 2017-06-17 RX ADMIN — Medication 75 MILLIGRAM(S): at 06:32

## 2017-06-17 RX ADMIN — ENOXAPARIN SODIUM 30 MILLIGRAM(S): 100 INJECTION SUBCUTANEOUS at 16:16

## 2017-06-17 RX ADMIN — Medication 100 MILLIGRAM(S): at 14:45

## 2017-06-17 RX ADMIN — CELECOXIB 200 MILLIGRAM(S): 200 CAPSULE ORAL at 06:31

## 2017-06-17 NOTE — PROGRESS NOTE ADULT - SUBJECTIVE AND OBJECTIVE BOX
HISTORY OF PRESENT ILLNESS  61 year old female with a PMHx noted below who presented to [facility] on 6/8 for elective b/L TKR. She has had ongoing bilateral knee osteoarthritis which failed conservative management (viscosupplementation and therapy). She required the use of a SAC intermittently over the last year. Underwent b/L TKR on  6/8 by Dr. Harrell with EBL = 50cc. No intra-operative complications. Post-operatively placed on Lovenox for DVT PPx. Also noted to have a single episode of fever on POD#2 - believed to be reactive. Pain controlled on Oxycodone PRN and Tylenol PRN.    PMHx: Hypertension, thyroid nodule - benign, lumbar radiculopathy, gout    TODAY'S SUBJECTIVE & REVIEW OF SYMPTOMS  [X] Constiutional WNL            [X] Cardio WNL               [X] Resp WNL  [X] GI WNL                           [X]  WNL                    [X] Heme WNL  [X] Endo WNL                       [  ] Skin WNL                  [  ] MSK WNL  [X] Neuro WNL                      [X] Cognitive WNL            [X] Psych WNL    Patient seen and examined. No acute events overnight.  Pt c/o bilateral knee pain controlled with meds.  Pt reports persistent weakness both LEs left>right.  +BM  Denies bladder complaints      VITALS  T(C): 36.8  T(F): 98.3, Max: 99.1 (06-15 @ 20:47)  HR: 80 (74 - 83)  BP: 116/65 (116/65 - 124/75)  RR:  (18 - 18)  SpO2:  (95% - 100%)  Wt(kg): --    PHYSICAL EXAM  Constitutional - NAD, Comfortable  HEENT - NCAT, EOMI  Chest - CTA bilaterally, No wheeze, No rhonchi, No crackles  Cardiovascular - RRR, S1S2  Abdomen - BS+, Soft, NTND  Extremities -Mild  L>R LE edema, No calf tenderness   Neurologic Exam -                    Cognitive - Awake, Alert, AAO to self, place, date, year, situation     Motor - Limited ROM b/L knee flexion/extension                    B/L UE - 5/5                     LEFT    LE - HF 2/5, KE 3-/5, DF 5/5, PF 5/5                    RIGHT LE - HF 2/5, KE 3/5, DF 5/5, PF 5/5        Sensory - Intact to LT  Psychiatric - Mood stable, Affect WNL  Wounds - B/L knee honeycomb dressings removed; incisions CDI  No erythema noted    FUNCTIONAL STATUS  Gait - 150' using RW requiringsupervision negotiating four 6" stairs using 2HR supervision  Transfers - sit-to-stand supervision  ADL's - grooming supervision, toileting Supervision  Functional Transfers - Supervision    RECENT LABS/IMAGING             10.0   7.6   )-----------( 336      ( 14 Jun 2017 07:07 )             30.3      139  |  99  |  14.0  ----------------------------<  102   ( 14 June 2017)  4.4   |  29.0  |  0.62    Ca    9.6      14 Jun 2017 07:07    TPro  6.2<L>  /  Alb  3.5  /  TBili  0.7  /  DBili  x   /  AST  20  /  ALT  21  /  AlkPhos  82  06-14    Prealb 6/14 - 15    RADIOLOGY/OTHER RESULTS  -----    CURRENT MEDICATIONS   MEDICATIONS  (STANDING):  enoxaparin Injectable 30milliGRAM(s) SubCutaneous every 12 hours  docusate sodium 100milliGRAM(s) Oral three times a day  ferrous    sulfate 325milliGRAM(s) Oral daily  multivitamin 1Tablet(s) Oral daily  valsartan 80milliGRAM(s) Oral daily  senna 2Tablet(s) Oral at bedtime  folic acid 1milliGRAM(s) Oral daily  allopurinol 300milliGRAM(s) Oral daily  famotidine    Tablet 20milliGRAM(s) Oral two times a day  metoprolol 75milliGRAM(s) Oral every 12 hours  celecoxib 200milliGRAM(s) Oral two times a day before meals  furosemide    Tablet 20milliGRAM(s) Oral daily    MEDICATIONS  (PRN):  acetaminophen   Tablet. 650milliGRAM(s) Oral every 6 hours PRN Mild Pain (1 - 3)  bisacodyl 5milliGRAM(s) Oral every 12 hours PRN Constipation  polyethylene glycol 3350 17Gram(s) Oral daily PRN Constipation  oxyCODONE IR 5milliGRAM(s) Oral every 4 hours PRN Moderate Pain (4 - 6)  oxyCODONE IR 10milliGRAM(s) Oral every 4 hours PRN Severe Pain (7 - 10)    ASSESSMENT/PLAN  61 year old female with functional deficits following elective bilateral TKA for OA    Anemia - Hb stable, Iron, MVI, Folate, monitor  BLE Edema - Lasix, Elevate  HTN - Lopressor, Valsartan  Gout - Allopurinol  GI/Bowel management - Pepcid, Colace, Senna, Dulcolax PRN, Miralax PRN   management - Toilet PRN  Skin - Honey comb dressings removed, Turn Q2  Pain - Tylenol PRN, Oxycodone Q4 PRN (changed from Q3 6/15), DC Oxycontin (6/16), Celebrex until 6/18, Ice to b/L knees.  DVT PPx - Lovenox, SCDs  Protein malnutrition/Diet - Reg/thins (Cardiac), Ensure TID    Continue 3 hours of comprehensive therapy, PT/OT. Plans for discharge to home on 6/18.  Pt requesting d/c this evening; as per rehab team all goals met today for d/c.  D/C home with home care

## 2017-06-25 RX ORDER — ASPIRIN/CALCIUM CARB/MAGNESIUM 324 MG
1 TABLET ORAL
Qty: 84 | Refills: 0 | OUTPATIENT
Start: 2017-06-25 | End: 2017-07-25

## 2017-06-26 PROCEDURE — 84134 ASSAY OF PREALBUMIN: CPT

## 2017-06-26 PROCEDURE — 80053 COMPREHEN METABOLIC PANEL: CPT

## 2017-06-26 PROCEDURE — 85027 COMPLETE CBC AUTOMATED: CPT

## 2017-06-26 PROCEDURE — 97112 NEUROMUSCULAR REEDUCATION: CPT

## 2017-06-26 PROCEDURE — 97163 PT EVAL HIGH COMPLEX 45 MIN: CPT

## 2017-06-26 PROCEDURE — 97116 GAIT TRAINING THERAPY: CPT

## 2017-06-26 PROCEDURE — 36415 COLL VENOUS BLD VENIPUNCTURE: CPT

## 2017-06-26 PROCEDURE — 97110 THERAPEUTIC EXERCISES: CPT

## 2017-06-26 PROCEDURE — 97530 THERAPEUTIC ACTIVITIES: CPT

## 2017-06-26 PROCEDURE — 97167 OT EVAL HIGH COMPLEX 60 MIN: CPT

## 2017-06-26 PROCEDURE — 97535 SELF CARE MNGMENT TRAINING: CPT

## 2017-06-30 ENCOUNTER — APPOINTMENT (OUTPATIENT)
Dept: ORTHOPEDIC SURGERY | Facility: CLINIC | Age: 62
End: 2017-06-30
Payer: COMMERCIAL

## 2017-06-30 VITALS
HEART RATE: 75 BPM | WEIGHT: 240 LBS | BODY MASS INDEX: 39.99 KG/M2 | HEIGHT: 65 IN | SYSTOLIC BLOOD PRESSURE: 137 MMHG | DIASTOLIC BLOOD PRESSURE: 78 MMHG

## 2017-06-30 PROCEDURE — 99024 POSTOP FOLLOW-UP VISIT: CPT

## 2017-06-30 PROCEDURE — 73562 X-RAY EXAM OF KNEE 3: CPT | Mod: 50

## 2017-08-01 ENCOUNTER — APPOINTMENT (OUTPATIENT)
Dept: ORTHOPEDIC SURGERY | Facility: CLINIC | Age: 62
End: 2017-08-01
Payer: MEDICARE

## 2017-08-01 VITALS
DIASTOLIC BLOOD PRESSURE: 98 MMHG | WEIGHT: 240 LBS | HEIGHT: 65 IN | HEART RATE: 64 BPM | SYSTOLIC BLOOD PRESSURE: 162 MMHG | BODY MASS INDEX: 39.99 KG/M2

## 2017-08-01 PROCEDURE — 99024 POSTOP FOLLOW-UP VISIT: CPT

## 2017-08-01 PROCEDURE — 73562 X-RAY EXAM OF KNEE 3: CPT | Mod: 50

## 2017-08-17 ENCOUNTER — RX RENEWAL (OUTPATIENT)
Age: 62
End: 2017-08-17

## 2017-09-01 ENCOUNTER — APPOINTMENT (OUTPATIENT)
Dept: ORTHOPEDIC SURGERY | Facility: CLINIC | Age: 62
End: 2017-09-01
Payer: COMMERCIAL

## 2017-09-01 VITALS
BODY MASS INDEX: 39.99 KG/M2 | DIASTOLIC BLOOD PRESSURE: 84 MMHG | HEIGHT: 65 IN | SYSTOLIC BLOOD PRESSURE: 147 MMHG | HEART RATE: 68 BPM | WEIGHT: 240 LBS

## 2017-09-01 PROCEDURE — 99024 POSTOP FOLLOW-UP VISIT: CPT

## 2017-09-01 PROCEDURE — 73562 X-RAY EXAM OF KNEE 3: CPT | Mod: 50

## 2017-12-08 ENCOUNTER — APPOINTMENT (OUTPATIENT)
Dept: ORTHOPEDIC SURGERY | Facility: CLINIC | Age: 62
End: 2017-12-08
Payer: COMMERCIAL

## 2017-12-08 VITALS
HEIGHT: 65 IN | SYSTOLIC BLOOD PRESSURE: 148 MMHG | WEIGHT: 240 LBS | HEART RATE: 72 BPM | BODY MASS INDEX: 39.99 KG/M2 | DIASTOLIC BLOOD PRESSURE: 86 MMHG

## 2017-12-08 PROCEDURE — 99213 OFFICE O/P EST LOW 20 MIN: CPT

## 2017-12-08 PROCEDURE — 73562 X-RAY EXAM OF KNEE 3: CPT | Mod: 50

## 2017-12-08 RX ORDER — OXYCODONE HCL/ACETAMINOPHEN 5 MG-500MG
CAPSULE ORAL
Refills: 0 | Status: DISCONTINUED | COMMUNITY
End: 2017-12-08

## 2017-12-08 RX ORDER — HYDROCORTISONE 25 MG/G
2.5 CREAM TOPICAL
Qty: 28 | Refills: 0 | Status: DISCONTINUED | COMMUNITY
Start: 2017-04-27 | End: 2017-12-08

## 2017-12-08 RX ORDER — LORAZEPAM 0.5 MG/1
0.5 TABLET ORAL
Qty: 1 | Refills: 0 | Status: DISCONTINUED | COMMUNITY
Start: 2017-06-02 | End: 2017-12-08

## 2017-12-08 RX ORDER — OXYCODONE 5 MG/1
5 TABLET ORAL
Qty: 60 | Refills: 0 | Status: DISCONTINUED | COMMUNITY
Start: 2017-06-30 | End: 2017-12-08

## 2017-12-08 RX ORDER — OXYCODONE 5 MG/1
5 TABLET ORAL
Qty: 60 | Refills: 0 | Status: DISCONTINUED | COMMUNITY
Start: 2017-09-01 | End: 2017-12-08

## 2017-12-08 RX ORDER — ZOLPIDEM TARTRATE 5 MG/1
5 TABLET ORAL
Qty: 14 | Refills: 0 | Status: DISCONTINUED | COMMUNITY
Start: 2017-06-02 | End: 2017-12-08

## 2017-12-08 RX ORDER — OXYCODONE 5 MG/1
5 TABLET ORAL
Qty: 60 | Refills: 0 | Status: DISCONTINUED | COMMUNITY
Start: 2017-06-13 | End: 2017-12-08

## 2017-12-08 RX ORDER — MUPIROCIN 20 MG/G
2 OINTMENT TOPICAL
Qty: 22 | Refills: 0 | Status: DISCONTINUED | COMMUNITY
Start: 2017-05-19 | End: 2017-12-08

## 2017-12-08 RX ORDER — RANITIDINE 150 MG/1
150 TABLET ORAL
Qty: 180 | Refills: 0 | Status: DISCONTINUED | COMMUNITY
Start: 2017-06-17 | End: 2017-12-08

## 2017-12-08 RX ORDER — OXYCODONE 5 MG/1
5 TABLET ORAL EVERY 8 HOURS
Qty: 90 | Refills: 0 | Status: DISCONTINUED | COMMUNITY
Start: 2017-07-14 | End: 2017-12-08

## 2017-12-08 RX ORDER — DOCUSATE SODIUM 100 MG/1
100 CAPSULE ORAL
Refills: 0 | Status: DISCONTINUED | COMMUNITY
End: 2017-12-08

## 2017-12-08 RX ORDER — OXYCODONE 5 MG/1
5 TABLET ORAL
Qty: 30 | Refills: 0 | Status: DISCONTINUED | COMMUNITY
Start: 2017-08-17 | End: 2017-12-08

## 2017-12-08 RX ORDER — MELOXICAM 15 MG/1
15 TABLET ORAL
Qty: 30 | Refills: 0 | Status: DISCONTINUED | COMMUNITY
Start: 2017-05-12 | End: 2017-12-08

## 2017-12-08 RX ORDER — VALSARTAN 40 MG/1
TABLET ORAL
Refills: 0 | Status: DISCONTINUED | COMMUNITY
End: 2017-12-08

## 2017-12-08 RX ORDER — CEPHALEXIN 250 MG/1
250 CAPSULE ORAL
Qty: 28 | Refills: 0 | Status: DISCONTINUED | COMMUNITY
Start: 2017-02-06 | End: 2017-12-08

## 2017-12-08 RX ORDER — PREDNISONE 10 MG/1
10 TABLET ORAL
Qty: 20 | Refills: 0 | Status: DISCONTINUED | COMMUNITY
Start: 2017-04-07 | End: 2017-12-08

## 2018-03-06 ENCOUNTER — OUTPATIENT (OUTPATIENT)
Dept: OUTPATIENT SERVICES | Facility: HOSPITAL | Age: 63
LOS: 1 days | End: 2018-03-06
Payer: COMMERCIAL

## 2018-03-06 ENCOUNTER — APPOINTMENT (OUTPATIENT)
Dept: MAMMOGRAPHY | Facility: IMAGING CENTER | Age: 63
End: 2018-03-06
Payer: COMMERCIAL

## 2018-03-06 DIAGNOSIS — Z90.49 ACQUIRED ABSENCE OF OTHER SPECIFIED PARTS OF DIGESTIVE TRACT: Chronic | ICD-10-CM

## 2018-03-06 DIAGNOSIS — Z12.31 ENCOUNTER FOR SCREENING MAMMOGRAM FOR MALIGNANT NEOPLASM OF BREAST: ICD-10-CM

## 2018-03-06 DIAGNOSIS — Z98.890 OTHER SPECIFIED POSTPROCEDURAL STATES: Chronic | ICD-10-CM

## 2018-03-06 DIAGNOSIS — Z98.1 ARTHRODESIS STATUS: Chronic | ICD-10-CM

## 2018-03-06 DIAGNOSIS — Z90.710 ACQUIRED ABSENCE OF BOTH CERVIX AND UTERUS: Chronic | ICD-10-CM

## 2018-03-06 PROCEDURE — 77067 SCR MAMMO BI INCL CAD: CPT

## 2018-03-06 PROCEDURE — 77067 SCR MAMMO BI INCL CAD: CPT | Mod: 26

## 2018-03-06 PROCEDURE — 77063 BREAST TOMOSYNTHESIS BI: CPT | Mod: 26

## 2018-03-06 PROCEDURE — 77063 BREAST TOMOSYNTHESIS BI: CPT

## 2018-05-02 ENCOUNTER — APPOINTMENT (OUTPATIENT)
Dept: ORTHOPEDIC SURGERY | Facility: CLINIC | Age: 63
End: 2018-05-02
Payer: COMMERCIAL

## 2018-05-02 VITALS
SYSTOLIC BLOOD PRESSURE: 127 MMHG | BODY MASS INDEX: 39.99 KG/M2 | DIASTOLIC BLOOD PRESSURE: 82 MMHG | HEIGHT: 65 IN | HEART RATE: 73 BPM | WEIGHT: 240 LBS

## 2018-05-02 PROCEDURE — 99214 OFFICE O/P EST MOD 30 MIN: CPT

## 2018-05-02 PROCEDURE — 73562 X-RAY EXAM OF KNEE 3: CPT | Mod: 50

## 2018-05-02 RX ORDER — ENOXAPARIN SODIUM 100 MG/ML
30 INJECTION SUBCUTANEOUS
Qty: 3 | Refills: 0 | Status: DISCONTINUED | COMMUNITY
Start: 2017-06-15 | End: 2018-05-02

## 2018-05-02 RX ORDER — IBUPROFEN 800 MG/1
800 TABLET, FILM COATED ORAL
Qty: 90 | Refills: 0 | Status: DISCONTINUED | COMMUNITY
Start: 2017-07-22 | End: 2018-05-02

## 2018-05-02 RX ORDER — METOPROLOL TARTRATE 50 MG/1
TABLET ORAL
Refills: 0 | Status: DISCONTINUED | COMMUNITY
End: 2018-05-02

## 2018-05-09 RX ORDER — RANITIDINE HYDROCHLORIDE 150 MG/1
1 TABLET, FILM COATED ORAL
Qty: 0 | Refills: 0 | COMMUNITY

## 2018-05-09 RX ORDER — METOPROLOL TARTRATE 50 MG
1 TABLET ORAL
Qty: 0 | Refills: 0 | COMMUNITY

## 2018-05-09 NOTE — ASU PATIENT PROFILE, ADULT - NS TRANSFER EYEGLASSES PAIRS
1 pair Detail Level: Zone Text: The above diagnosis and findings were noted on the exam but not discussed at this time with the patient.

## 2018-05-10 ENCOUNTER — OUTPATIENT (OUTPATIENT)
Dept: OUTPATIENT SERVICES | Facility: HOSPITAL | Age: 63
LOS: 1 days | Discharge: HOME | End: 2018-05-10

## 2018-05-10 VITALS
HEART RATE: 70 BPM | RESPIRATION RATE: 18 BRPM | SYSTOLIC BLOOD PRESSURE: 99 MMHG | DIASTOLIC BLOOD PRESSURE: 67 MMHG | OXYGEN SATURATION: 99 %

## 2018-05-10 VITALS
RESPIRATION RATE: 18 BRPM | DIASTOLIC BLOOD PRESSURE: 45 MMHG | OXYGEN SATURATION: 100 % | SYSTOLIC BLOOD PRESSURE: 131 MMHG | HEART RATE: 70 BPM | HEIGHT: 65 IN | TEMPERATURE: 99 F | WEIGHT: 229.94 LBS

## 2018-05-10 DIAGNOSIS — Z98.890 OTHER SPECIFIED POSTPROCEDURAL STATES: Chronic | ICD-10-CM

## 2018-05-10 DIAGNOSIS — Z90.710 ACQUIRED ABSENCE OF BOTH CERVIX AND UTERUS: Chronic | ICD-10-CM

## 2018-05-10 DIAGNOSIS — Z90.49 ACQUIRED ABSENCE OF OTHER SPECIFIED PARTS OF DIGESTIVE TRACT: Chronic | ICD-10-CM

## 2018-05-10 DIAGNOSIS — Z98.1 ARTHRODESIS STATUS: Chronic | ICD-10-CM

## 2018-05-10 LAB
ANION GAP SERPL CALC-SCNC: 8 MMOL/L — SIGNIFICANT CHANGE UP (ref 5–17)
BUN SERPL-MCNC: 16 MG/DL — SIGNIFICANT CHANGE UP (ref 7–23)
CALCIUM SERPL-MCNC: 9.7 MG/DL — SIGNIFICANT CHANGE UP (ref 8.5–10.1)
CHLORIDE SERPL-SCNC: 106 MMOL/L — SIGNIFICANT CHANGE UP (ref 96–108)
CO2 SERPL-SCNC: 27 MMOL/L — SIGNIFICANT CHANGE UP (ref 22–31)
CREAT SERPL-MCNC: 0.87 MG/DL — SIGNIFICANT CHANGE UP (ref 0.5–1.3)
GLUCOSE SERPL-MCNC: 109 MG/DL — HIGH (ref 70–99)
HCT VFR BLD CALC: 40.5 % — SIGNIFICANT CHANGE UP (ref 34.5–45)
HGB BLD-MCNC: 13.6 G/DL — SIGNIFICANT CHANGE UP (ref 11.5–15.5)
MCHC RBC-ENTMCNC: 30.8 PG — SIGNIFICANT CHANGE UP (ref 27–34)
MCHC RBC-ENTMCNC: 33.6 GM/DL — SIGNIFICANT CHANGE UP (ref 32–36)
MCV RBC AUTO: 91.6 FL — SIGNIFICANT CHANGE UP (ref 80–100)
NRBC # BLD: 0 /100 WBCS — SIGNIFICANT CHANGE UP (ref 0–0)
PLATELET # BLD AUTO: 238 K/UL — SIGNIFICANT CHANGE UP (ref 150–400)
POTASSIUM SERPL-MCNC: 5.3 MMOL/L — SIGNIFICANT CHANGE UP (ref 3.5–5.3)
POTASSIUM SERPL-SCNC: 5.3 MMOL/L — SIGNIFICANT CHANGE UP (ref 3.5–5.3)
RBC # BLD: 4.42 M/UL — SIGNIFICANT CHANGE UP (ref 3.8–5.2)
RBC # FLD: 14.2 % — SIGNIFICANT CHANGE UP (ref 10.3–14.5)
SODIUM SERPL-SCNC: 141 MMOL/L — SIGNIFICANT CHANGE UP (ref 135–145)
WBC # BLD: 5.94 K/UL — SIGNIFICANT CHANGE UP (ref 3.8–10.5)
WBC # FLD AUTO: 5.94 K/UL — SIGNIFICANT CHANGE UP (ref 3.8–10.5)

## 2018-05-10 NOTE — PROCEDURAL SAFETY CHECKLIST WITH OR WITHOUT SEDATION - NSPOSTCOMMENTFT_GEN_ALL_CORE
Probe inserted by DR. Quesada without difficulty at 0835; bubble study completed at 0837;probe removed at 0845; ramírez. well.

## 2018-05-10 NOTE — PACU DISCHARGE NOTE - COMMENTS
Pt. and  verb. agreement and understanding to and teach back to written and verbal discharge instructions.   Pt. discharged to home via private auto accompanied by .

## 2018-05-21 ENCOUNTER — APPOINTMENT (OUTPATIENT)
Dept: CARDIOLOGY | Facility: CLINIC | Age: 63
End: 2018-05-21
Payer: MEDICARE

## 2018-05-21 DIAGNOSIS — I10 ESSENTIAL (PRIMARY) HYPERTENSION: ICD-10-CM

## 2018-05-21 DIAGNOSIS — E66.9 OBESITY, UNSPECIFIED: ICD-10-CM

## 2018-05-21 DIAGNOSIS — Z86.73 PERSONAL HISTORY OF TRANSIENT ISCHEMIC ATTACK (TIA), AND CEREBRAL INFARCTION WITHOUT RESIDUAL DEFICITS: ICD-10-CM

## 2018-05-21 DIAGNOSIS — I08.1 RHEUMATIC DISORDERS OF BOTH MITRAL AND TRICUSPID VALVES: ICD-10-CM

## 2018-05-21 DIAGNOSIS — I63.8 OTHER CEREBRAL INFARCTION: ICD-10-CM

## 2018-05-21 PROCEDURE — 93000 ELECTROCARDIOGRAM COMPLETE: CPT

## 2018-05-21 PROCEDURE — 99205 OFFICE O/P NEW HI 60 MIN: CPT

## 2018-06-06 ENCOUNTER — APPOINTMENT (OUTPATIENT)
Dept: ORTHOPEDIC SURGERY | Facility: CLINIC | Age: 63
End: 2018-06-06
Payer: COMMERCIAL

## 2018-06-06 VITALS
DIASTOLIC BLOOD PRESSURE: 83 MMHG | HEART RATE: 80 BPM | WEIGHT: 240 LBS | SYSTOLIC BLOOD PRESSURE: 145 MMHG | BODY MASS INDEX: 39.99 KG/M2 | HEIGHT: 65 IN

## 2018-06-06 PROCEDURE — 99213 OFFICE O/P EST LOW 20 MIN: CPT

## 2018-08-21 PROBLEM — I10 ESSENTIAL (PRIMARY) HYPERTENSION: Chronic | Status: ACTIVE | Noted: 2017-05-18

## 2018-08-21 PROBLEM — E04.1 NONTOXIC SINGLE THYROID NODULE: Chronic | Status: ACTIVE | Noted: 2017-05-18

## 2018-08-22 ENCOUNTER — MED ADMIN CHARGE (OUTPATIENT)
Age: 63
End: 2018-08-22

## 2018-08-23 ENCOUNTER — INPATIENT (INPATIENT)
Facility: HOSPITAL | Age: 63
LOS: 0 days | Discharge: HOME | DRG: 274 | End: 2018-08-24
Attending: INTERNAL MEDICINE | Admitting: INTERNAL MEDICINE
Payer: COMMERCIAL

## 2018-08-23 ENCOUNTER — APPOINTMENT (OUTPATIENT)
Dept: CV DIAGNOSITCS | Facility: HOSPITAL | Age: 63
End: 2018-08-23

## 2018-08-23 ENCOUNTER — TRANSCRIPTION ENCOUNTER (OUTPATIENT)
Age: 63
End: 2018-08-23

## 2018-08-23 VITALS
DIASTOLIC BLOOD PRESSURE: 80 MMHG | WEIGHT: 240.08 LBS | HEIGHT: 65 IN | OXYGEN SATURATION: 98 % | RESPIRATION RATE: 14 BRPM | HEART RATE: 80 BPM | SYSTOLIC BLOOD PRESSURE: 131 MMHG | TEMPERATURE: 98 F

## 2018-08-23 DIAGNOSIS — Z98.1 ARTHRODESIS STATUS: Chronic | ICD-10-CM

## 2018-08-23 DIAGNOSIS — Z98.890 OTHER SPECIFIED POSTPROCEDURAL STATES: Chronic | ICD-10-CM

## 2018-08-23 DIAGNOSIS — Z90.49 ACQUIRED ABSENCE OF OTHER SPECIFIED PARTS OF DIGESTIVE TRACT: Chronic | ICD-10-CM

## 2018-08-23 DIAGNOSIS — Q21.9 CONGENITAL MALFORMATION OF CARDIAC SEPTUM, UNSPECIFIED: ICD-10-CM

## 2018-08-23 DIAGNOSIS — Z90.710 ACQUIRED ABSENCE OF BOTH CERVIX AND UTERUS: Chronic | ICD-10-CM

## 2018-08-23 LAB
ANION GAP SERPL CALC-SCNC: 13 MMOL/L — SIGNIFICANT CHANGE UP (ref 5–17)
BUN SERPL-MCNC: 20 MG/DL — SIGNIFICANT CHANGE UP (ref 7–23)
CALCIUM SERPL-MCNC: 9.7 MG/DL — SIGNIFICANT CHANGE UP (ref 8.4–10.5)
CHLORIDE SERPL-SCNC: 104 MMOL/L — SIGNIFICANT CHANGE UP (ref 96–108)
CO2 SERPL-SCNC: 22 MMOL/L — SIGNIFICANT CHANGE UP (ref 22–31)
CREAT SERPL-MCNC: 0.84 MG/DL — SIGNIFICANT CHANGE UP (ref 0.5–1.3)
GLUCOSE SERPL-MCNC: 105 MG/DL — HIGH (ref 70–99)
HCT VFR BLD CALC: 41.2 % — SIGNIFICANT CHANGE UP (ref 34.5–45)
HGB BLD-MCNC: 13.7 G/DL — SIGNIFICANT CHANGE UP (ref 11.5–15.5)
MCHC RBC-ENTMCNC: 30.8 PG — SIGNIFICANT CHANGE UP (ref 27–34)
MCHC RBC-ENTMCNC: 33.2 GM/DL — SIGNIFICANT CHANGE UP (ref 32–36)
MCV RBC AUTO: 92.8 FL — SIGNIFICANT CHANGE UP (ref 80–100)
PLATELET # BLD AUTO: 291 K/UL — SIGNIFICANT CHANGE UP (ref 150–400)
POTASSIUM SERPL-MCNC: 4.1 MMOL/L — SIGNIFICANT CHANGE UP (ref 3.5–5.3)
POTASSIUM SERPL-SCNC: 4.1 MMOL/L — SIGNIFICANT CHANGE UP (ref 3.5–5.3)
RBC # BLD: 4.43 M/UL — SIGNIFICANT CHANGE UP (ref 3.8–5.2)
RBC # FLD: 13.2 % — SIGNIFICANT CHANGE UP (ref 10.3–14.5)
SODIUM SERPL-SCNC: 139 MMOL/L — SIGNIFICANT CHANGE UP (ref 135–145)
WBC # BLD: 6.4 K/UL — SIGNIFICANT CHANGE UP (ref 3.8–10.5)
WBC # FLD AUTO: 6.4 K/UL — SIGNIFICANT CHANGE UP (ref 3.8–10.5)

## 2018-08-23 PROCEDURE — 99152 MOD SED SAME PHYS/QHP 5/>YRS: CPT

## 2018-08-23 PROCEDURE — 93580 TRANSCATH CLOSURE OF ASD: CPT

## 2018-08-23 PROCEDURE — 93662 INTRACARDIAC ECG (ICE): CPT | Mod: 26

## 2018-08-23 PROCEDURE — 93306 TTE W/DOPPLER COMPLETE: CPT | Mod: 26

## 2018-08-23 PROCEDURE — 93010 ELECTROCARDIOGRAM REPORT: CPT

## 2018-08-23 RX ORDER — SODIUM CHLORIDE 9 MG/ML
1000 INJECTION INTRAMUSCULAR; INTRAVENOUS; SUBCUTANEOUS
Qty: 0 | Refills: 0 | Status: DISCONTINUED | OUTPATIENT
Start: 2018-08-23 | End: 2018-08-23

## 2018-08-23 RX ORDER — DIAZEPAM 5 MG
5 TABLET ORAL ONCE
Qty: 0 | Refills: 0 | Status: DISCONTINUED | OUTPATIENT
Start: 2018-08-23 | End: 2018-08-24

## 2018-08-23 RX ORDER — ALLOPURINOL 300 MG
300 TABLET ORAL DAILY
Qty: 0 | Refills: 0 | Status: DISCONTINUED | OUTPATIENT
Start: 2018-08-23 | End: 2018-08-24

## 2018-08-23 RX ORDER — BUPROPION HYDROCHLORIDE 150 MG/1
150 TABLET, EXTENDED RELEASE ORAL DAILY
Qty: 0 | Refills: 0 | Status: DISCONTINUED | OUTPATIENT
Start: 2018-08-23 | End: 2018-08-24

## 2018-08-23 RX ORDER — LOSARTAN POTASSIUM 100 MG/1
50 TABLET, FILM COATED ORAL DAILY
Qty: 0 | Refills: 0 | Status: DISCONTINUED | OUTPATIENT
Start: 2018-08-23 | End: 2018-08-24

## 2018-08-23 RX ORDER — PANTOPRAZOLE SODIUM 20 MG/1
40 TABLET, DELAYED RELEASE ORAL
Qty: 0 | Refills: 0 | Status: DISCONTINUED | OUTPATIENT
Start: 2018-08-23 | End: 2018-08-24

## 2018-08-23 RX ORDER — ASPIRIN/CALCIUM CARB/MAGNESIUM 324 MG
81 TABLET ORAL DAILY
Qty: 0 | Refills: 0 | Status: DISCONTINUED | OUTPATIENT
Start: 2018-08-23 | End: 2018-08-24

## 2018-08-23 RX ORDER — METOPROLOL TARTRATE 50 MG
100 TABLET ORAL EVERY 12 HOURS
Qty: 0 | Refills: 0 | Status: DISCONTINUED | OUTPATIENT
Start: 2018-08-23 | End: 2018-08-23

## 2018-08-23 RX ORDER — METOPROLOL TARTRATE 50 MG
100 TABLET ORAL
Qty: 0 | Refills: 0 | Status: DISCONTINUED | OUTPATIENT
Start: 2018-08-23 | End: 2018-08-24

## 2018-08-23 RX ORDER — METOPROLOL TARTRATE 50 MG
0 TABLET ORAL
Qty: 0 | Refills: 0 | COMMUNITY

## 2018-08-23 RX ORDER — ACETAMINOPHEN 500 MG
650 TABLET ORAL ONCE
Qty: 0 | Refills: 0 | Status: COMPLETED | OUTPATIENT
Start: 2018-08-23 | End: 2018-08-23

## 2018-08-23 RX ORDER — ATORVASTATIN CALCIUM 80 MG/1
80 TABLET, FILM COATED ORAL AT BEDTIME
Qty: 0 | Refills: 0 | Status: DISCONTINUED | OUTPATIENT
Start: 2018-08-23 | End: 2018-08-24

## 2018-08-23 RX ORDER — CEFAZOLIN SODIUM 1 G
1000 VIAL (EA) INJECTION EVERY 8 HOURS
Qty: 0 | Refills: 0 | Status: COMPLETED | OUTPATIENT
Start: 2018-08-23 | End: 2018-08-24

## 2018-08-23 RX ORDER — CLOPIDOGREL BISULFATE 75 MG/1
75 TABLET, FILM COATED ORAL DAILY
Qty: 0 | Refills: 0 | Status: DISCONTINUED | OUTPATIENT
Start: 2018-08-23 | End: 2018-08-24

## 2018-08-23 RX ORDER — FUROSEMIDE 40 MG
20 TABLET ORAL DAILY
Qty: 0 | Refills: 0 | Status: DISCONTINUED | OUTPATIENT
Start: 2018-08-23 | End: 2018-08-24

## 2018-08-23 RX ADMIN — Medication 100 MILLIGRAM(S): at 17:15

## 2018-08-23 RX ADMIN — Medication 100 MILLIGRAM(S): at 17:07

## 2018-08-23 RX ADMIN — ATORVASTATIN CALCIUM 80 MILLIGRAM(S): 80 TABLET, FILM COATED ORAL at 21:09

## 2018-08-23 NOTE — H&P CARDIOLOGY - HISTORY OF PRESENT ILLNESS
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 Angelo. The patient states she does feel moderate shortness of breath upon walking more than 1 flight of stairs. She admits relief upon resting The patient states she suffered a CVA in March 2018, and subsequently had a holter monitor which did not reveal Afib. A follow up MER was performed which revealed with intra atrial shunting with left to right and right to left shunting. She currently denies chest pain, syncope, dizziness, headache or nausea.

## 2018-08-23 NOTE — DISCHARGE NOTE ADULT - CARE PLAN
Principal Discharge DX:	PFO (patent foramen ovale)  Goal:	ASD closured  Assessment and plan of treatment:	Echo in 1 month, then 4- 6 months. Continue Aspirin and Plavix for 3 months followed by aspirin only.  Prophylactic antibiotics prior to invasive procedures for 6 months.  Secondary Diagnosis:	Essential hypertension  Goal:	Your blood pressure will be controlled.  Assessment and plan of treatment:	Continue with your blood pressure medications; eat a heart healthy diet with low salt diet; exercise regularly (consult with your physician or cardiologist first); maintain a heart healthy weight; if you smoke - quit (A resource to help you stop smoking is the [facility] [employer] – phone number [phone].); include healthy ways to manage stress. Continue to follow with your primary care physician or cardiologist.  Secondary Diagnosis:	Mixed hyperlipidemia  Goal:	Your LDL cholesterol will be less than 70mg/dL  Assessment and plan of treatment:	Continue with your cholesterol medications. Eat a heart healthy diet that is low in saturated fats and salt, and includes whole grains, fruits, vegetables and lean protein; exercise regularly (consult with your physician or cardiologist first); maintain a heart healthy weight; if you smoke - quit (A resource to help you stop smoking is the [facility] [employer] – phone number [phone].). Continue to follow with your primary physician or cardiologist. Principal Discharge DX:	ASD (atrial septal defect)  Goal:	ASD closure  Assessment and plan of treatment:	Echo in 1 month, then 4- 6 months. Continue Aspirin and Plavix for 3 months followed by aspirin only.  Prophylactic antibiotics prior to invasive procedures for 6 months.  Secondary Diagnosis:	Essential hypertension  Goal:	Your blood pressure will be controlled.  Assessment and plan of treatment:	Continue with your blood pressure medications; eat a heart healthy diet with low salt diet; exercise regularly (consult with your physician or cardiologist first); maintain a heart healthy weight; if you smoke - quit (A resource to help you stop smoking is the [facility] [employer] – phone number [phone].); include healthy ways to manage stress. Continue to follow with your primary care physician or cardiologist.  Secondary Diagnosis:	Mixed hyperlipidemia  Goal:	Your LDL cholesterol will be less than 70mg/dL  Assessment and plan of treatment:	Continue with your cholesterol medications. Eat a heart healthy diet that is low in saturated fats and salt, and includes whole grains, fruits, vegetables and lean protein; exercise regularly (consult with your physician or cardiologist first); maintain a heart healthy weight; if you smoke - quit (A resource to help you stop smoking is the [facility] [employer] – phone number [phone].). Continue to follow with your primary physician or cardiologist.

## 2018-08-23 NOTE — DISCHARGE NOTE ADULT - MEDICATION SUMMARY - MEDICATIONS TO TAKE
I will START or STAY ON the medications listed below when I get home from the hospital:    aspirin 81 mg oral delayed release tablet  -- 1 tab(s) by mouth once a day  -- Indication: For ASD (atrial septal defect)    valsartan 80 mg oral tablet  -- 1 tab(s) by mouth once a day  -- Indication: For hypertension    allopurinol 300 mg oral tablet  -- 1 tab(s) by mouth once a day  -- Indication: For gout    Lipitor 80 mg oral tablet  -- 1 tab(s) by mouth once a day  -- Indication: For high lipids    Plavix 75 mg oral tablet  -- 1 tab(s) by mouth once a day  -- Indication: For ASD (atrial septal defect)    Metoprolol Tartrate 100 mg oral tablet  -- 1 tab(s) by mouth 2 times a day  -- Indication: For hypertension    Lasix 20 mg oral tablet  -- 1 tab(s) by mouth once a day  -- Indication: For hypertension    Zantac 150 oral tablet  -- 1 tab(s) by mouth once a day  -- Indication: For stomach    Wellbutrin  mg/24 hours oral tablet, extended release  -- 1 tab(s) by mouth every 24 hours  -- Indication: For depression    Folbic oral tablet  -- 1 tab(s) by mouth once a day  -- Indication: For supplement    Vitamin D2 50,000 intl units (1.25 mg) oral capsule  -- 1 cap(s) by mouth once a week  -- Indication: For supplement

## 2018-08-23 NOTE — DISCHARGE NOTE ADULT - CARE PROVIDER_API CALL
Buddy Angelo), Cardiology; Internal Medicine; Interventional Cardiology  [address] [place] [address]  Phone: [phone]  Fax: [phone]

## 2018-08-23 NOTE — DISCHARGE NOTE ADULT - PATIENT PORTAL LINK FT
You can access the [facility] Patient Portal, offered by [facility], by registering with the following website: [email or web address]

## 2018-08-23 NOTE — DISCHARGE NOTE ADULT - HOSPITAL COURSE
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 Angelo. The patient states she does feel moderate shortness of breath upon walking more than 1 flight of stairs. She admits relief upon resting The patient states she suffered a CVA in March 2018, and subsequently had a holter monitor which did not reveal Afib. A follow up MER was performed which revealed with intra atrial shunting with left to right and right to left shunting. She currently denies chest pain, syncope, dizziness, headache or nausea.   Pt s/p ASD closure via R groin. Pt tolerated procedure well and overnight remained uneventful. No c/o chest pain or SOB. Pt is hemodynamically stable, EKG and all lab results reviewed. Insertion/incision site benign, no bleeding or hematoma, and cath site dressing changed. Discharge teaching provided to Pt/caretaker and verbalized understanding the instruction. Pt is stable for discharge home as per attending. 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 Angelo. The patient states she does feel moderate shortness of breath upon walking more than 1 flight of stairs. She admits relief upon resting The patient states she suffered a CVA in March 2018, and subsequently had a holter monitor which did not reveal Afib. A follow up MER was performed which revealed with intra atrial shunting with left to right and right to left shunting. She currently denies chest pain, syncope, dizziness, headache or nausea.   Pt s/p ASD closure via R groin. Post procedure echo shows closure device is well-seated. Pt tolerated procedure well and overnight remained uneventful. No c/o chest pain or SOB. Pt is hemodynamically stable, EKG and all lab results reviewed. Right groin site benign without bleeding or hematoma. Discharge teaching provided to pt and verbalized understanding of instructions. Pt is stable for discharge home as per attending.

## 2018-08-23 NOTE — DISCHARGE NOTE ADULT - PLAN OF CARE
ASD closured Echo in 1 month, then 4- 6 months. Continue Aspirin and Plavix for 3 months followed by aspirin only.  Prophylactic antibiotics prior to invasive procedures for 6 months. Your blood pressure will be controlled. Continue with your blood pressure medications; eat a heart healthy diet with low salt diet; exercise regularly (consult with your physician or cardiologist first); maintain a heart healthy weight; if you smoke - quit (A resource to help you stop smoking is the [facility] – phone number [phone].); include healthy ways to manage stress. Continue to follow with your primary care physician or cardiologist. Your LDL cholesterol will be less than 70mg/dL Continue with your cholesterol medications. Eat a heart healthy diet that is low in saturated fats and salt, and includes whole grains, fruits, vegetables and lean protein; exercise regularly (consult with your physician or cardiologist first); maintain a heart healthy weight; if you smoke - quit (A resource to help you stop smoking is the [facility] – phone number [phone].). Continue to follow with your primary physician or cardiologist. ASD closure

## 2018-08-24 VITALS
RESPIRATION RATE: 18 BRPM | OXYGEN SATURATION: 98 % | DIASTOLIC BLOOD PRESSURE: 95 MMHG | TEMPERATURE: 98 F | SYSTOLIC BLOOD PRESSURE: 134 MMHG | HEART RATE: 64 BPM

## 2018-08-24 PROBLEM — I69.322 DYSARTHRIA FOLLOWING CEREBRAL INFARCTION: Chronic | Status: ACTIVE | Noted: 2018-08-23

## 2018-08-24 LAB
ANION GAP SERPL CALC-SCNC: 11 MMOL/L — SIGNIFICANT CHANGE UP (ref 5–17)
BASOPHILS # BLD AUTO: 0.1 K/UL — SIGNIFICANT CHANGE UP (ref 0–0.2)
BASOPHILS NFR BLD AUTO: 1 % — SIGNIFICANT CHANGE UP (ref 0–2)
BUN SERPL-MCNC: 19 MG/DL — SIGNIFICANT CHANGE UP (ref 7–23)
CALCIUM SERPL-MCNC: 9.5 MG/DL — SIGNIFICANT CHANGE UP (ref 8.4–10.5)
CHLORIDE SERPL-SCNC: 105 MMOL/L — SIGNIFICANT CHANGE UP (ref 96–108)
CO2 SERPL-SCNC: 25 MMOL/L — SIGNIFICANT CHANGE UP (ref 22–31)
CREAT SERPL-MCNC: 0.97 MG/DL — SIGNIFICANT CHANGE UP (ref 0.5–1.3)
EOSINOPHIL # BLD AUTO: 0.3 K/UL — SIGNIFICANT CHANGE UP (ref 0–0.5)
EOSINOPHIL NFR BLD AUTO: 3.8 % — SIGNIFICANT CHANGE UP (ref 0–6)
GLUCOSE SERPL-MCNC: 105 MG/DL — HIGH (ref 70–99)
HCT VFR BLD CALC: 40 % — SIGNIFICANT CHANGE UP (ref 34.5–45)
HGB BLD-MCNC: 13.1 G/DL — SIGNIFICANT CHANGE UP (ref 11.5–15.5)
LYMPHOCYTES # BLD AUTO: 2.2 K/UL — SIGNIFICANT CHANGE UP (ref 1–3.3)
LYMPHOCYTES # BLD AUTO: 26.5 % — SIGNIFICANT CHANGE UP (ref 13–44)
MCHC RBC-ENTMCNC: 30.6 PG — SIGNIFICANT CHANGE UP (ref 27–34)
MCHC RBC-ENTMCNC: 32.8 GM/DL — SIGNIFICANT CHANGE UP (ref 32–36)
MCV RBC AUTO: 93.2 FL — SIGNIFICANT CHANGE UP (ref 80–100)
MONOCYTES # BLD AUTO: 0.7 K/UL — SIGNIFICANT CHANGE UP (ref 0–0.9)
MONOCYTES NFR BLD AUTO: 8.7 % — SIGNIFICANT CHANGE UP (ref 2–14)
NEUTROPHILS # BLD AUTO: 4.9 K/UL — SIGNIFICANT CHANGE UP (ref 1.8–7.4)
NEUTROPHILS NFR BLD AUTO: 60 % — SIGNIFICANT CHANGE UP (ref 43–77)
PLATELET # BLD AUTO: 260 K/UL — SIGNIFICANT CHANGE UP (ref 150–400)
POTASSIUM SERPL-MCNC: 4.2 MMOL/L — SIGNIFICANT CHANGE UP (ref 3.5–5.3)
POTASSIUM SERPL-SCNC: 4.2 MMOL/L — SIGNIFICANT CHANGE UP (ref 3.5–5.3)
RBC # BLD: 4.29 M/UL — SIGNIFICANT CHANGE UP (ref 3.8–5.2)
RBC # FLD: 12.9 % — SIGNIFICANT CHANGE UP (ref 10.3–14.5)
SODIUM SERPL-SCNC: 141 MMOL/L — SIGNIFICANT CHANGE UP (ref 135–145)
WBC # BLD: 8.2 K/UL — SIGNIFICANT CHANGE UP (ref 3.8–10.5)
WBC # FLD AUTO: 8.2 K/UL — SIGNIFICANT CHANGE UP (ref 3.8–10.5)

## 2018-08-24 PROCEDURE — 85027 COMPLETE CBC AUTOMATED: CPT

## 2018-08-24 PROCEDURE — C1817: CPT

## 2018-08-24 PROCEDURE — C1894: CPT

## 2018-08-24 PROCEDURE — 93005 ELECTROCARDIOGRAM TRACING: CPT

## 2018-08-24 PROCEDURE — 93306 TTE W/DOPPLER COMPLETE: CPT

## 2018-08-24 PROCEDURE — 99153 MOD SED SAME PHYS/QHP EA: CPT

## 2018-08-24 PROCEDURE — C1887: CPT

## 2018-08-24 PROCEDURE — 93580 TRANSCATH CLOSURE OF ASD: CPT

## 2018-08-24 PROCEDURE — C1759: CPT

## 2018-08-24 PROCEDURE — 80048 BASIC METABOLIC PNL TOTAL CA: CPT

## 2018-08-24 PROCEDURE — 99152 MOD SED SAME PHYS/QHP 5/>YRS: CPT

## 2018-08-24 PROCEDURE — 93662 INTRACARDIAC ECG (ICE): CPT

## 2018-08-24 PROCEDURE — C1769: CPT

## 2018-08-24 RX ORDER — ACETAMINOPHEN 500 MG
650 TABLET ORAL ONCE
Qty: 0 | Refills: 0 | Status: COMPLETED | OUTPATIENT
Start: 2018-08-24 | End: 2018-08-24

## 2018-08-24 RX ADMIN — PANTOPRAZOLE SODIUM 40 MILLIGRAM(S): 20 TABLET, DELAYED RELEASE ORAL at 06:05

## 2018-08-24 RX ADMIN — LOSARTAN POTASSIUM 50 MILLIGRAM(S): 100 TABLET, FILM COATED ORAL at 06:05

## 2018-08-24 RX ADMIN — Medication 100 MILLIGRAM(S): at 00:00

## 2018-08-24 RX ADMIN — Medication 100 MILLIGRAM(S): at 06:05

## 2018-08-24 RX ADMIN — BUPROPION HYDROCHLORIDE 150 MILLIGRAM(S): 150 TABLET, EXTENDED RELEASE ORAL at 06:05

## 2018-08-24 RX ADMIN — Medication 300 MILLIGRAM(S): at 06:05

## 2018-08-24 RX ADMIN — Medication 650 MILLIGRAM(S): at 09:39

## 2018-08-24 RX ADMIN — Medication 81 MILLIGRAM(S): at 06:05

## 2018-08-24 RX ADMIN — CLOPIDOGREL BISULFATE 75 MILLIGRAM(S): 75 TABLET, FILM COATED ORAL at 06:05

## 2018-08-24 NOTE — PROGRESS NOTE ADULT - SUBJECTIVE AND OBJECTIVE BOX
Subjective/Objective:  Patient is a 62y old  Female who presents with a chief complaint of ASD closure (23 Aug 2018 20:26)    Allergies    No Known Allergies    Intolerances      Medications:  allopurinol 300 milliGRAM(s) Oral daily  aspirin enteric coated 81 milliGRAM(s) Oral daily  atorvastatin 80 milliGRAM(s) Oral at bedtime  buPROPion XL . 150 milliGRAM(s) Oral daily  clopidogrel Tablet 75 milliGRAM(s) Oral daily  diazepam    Tablet 5 milliGRAM(s) Oral once  furosemide    Tablet 20 milliGRAM(s) Oral daily  losartan 50 milliGRAM(s) Oral daily  metoprolol tartrate 100 milliGRAM(s) Oral two times a day  pantoprazole    Tablet 40 milliGRAM(s) Oral before breakfast      Review of Systems:   No c/o chest pain or SOB, and all others negative.    Vitals:  T(C): 36.5 (18 @ 06:03), Max: 36.9 (18 @ 07:00)  HR: 74 (18 @ 06:03) (60 - 80)  BP: 110/62 (18 @ 06:03) (110/62 - 146/68)  BP(mean): 97 (18 @ 07:00) (97 - 97)  RR: 18 (18 @ 06:03) (14 - 18)  SpO2: 100% (18 @ 06:03) (97% - 100%)  Wt(kg): --  Daily Height in cm: 165.1 (23 Aug 2018 09:55)    Daily Weight in k.9 (23 Aug 2018 07:00)  I&O's Summary    23 Aug 2018 07:01  -  24 Aug 2018 06:11  --------------------------------------------------------  IN: 260 mL / OUT: 400 mL / NET: -140 mL      Physical Exam:  Appearance: Pt in NAD, non-toxic  Cardiovascular: S1 S2  Cath Site: No evidence of bleeding or hematoma, Non-tender to palpation, 2+ distal pulses  Respiratory: Clear to auscultation bilaterally  Gastrointestinal: Soft, NT/ND, Bowel Sounds +  Neurologic: Non-focal                          13.1   8.2   )-----------( 260      ( 24 Aug 2018 00:04 )             40.0     08-    141  |  105  |  19  ----------------------------<  105<H>  4.2   |  25  |  0.97    Ca    9.5      24 Aug 2018 00:04              Lipid panel   Hgb A1c     Interpretation of Telemetry: SR at HR 60-70's.  No special event over night.    Assessment/Plan:   S/P PFO closure via R groin. Pt tolerated procedure well and overnight remained uneventful. No c/o chest pain or SOB. Pt is hemodynamically stable, EKG and all lab results reviewed. Insertion/incision site benign, no bleeding or hematoma, and cath site dressing changed. Discharge teaching provided to Pt/caretaker and verbalized understanding the instruction.   F/U with cardiologist in 1-2 weeks.  Plan to see attending this morning then may d/c home in Am if stable.  cont assess and monitor.

## 2018-09-10 ENCOUNTER — NON-APPOINTMENT (OUTPATIENT)
Age: 63
End: 2018-09-10

## 2018-09-10 ENCOUNTER — APPOINTMENT (OUTPATIENT)
Dept: CARDIOLOGY | Facility: CLINIC | Age: 63
End: 2018-09-10
Payer: MEDICARE

## 2018-09-10 VITALS
SYSTOLIC BLOOD PRESSURE: 146 MMHG | HEIGHT: 65 IN | BODY MASS INDEX: 39.99 KG/M2 | WEIGHT: 240 LBS | OXYGEN SATURATION: 100 % | DIASTOLIC BLOOD PRESSURE: 73 MMHG | HEART RATE: 74 BPM

## 2018-09-10 DIAGNOSIS — Z87.74 PERSONAL HISTORY OF (CORRECTED) CONGENITAL MALFORMATIONS OF HEART AND CIRCULATORY SYSTEM: ICD-10-CM

## 2018-09-10 DIAGNOSIS — Q24.8 OTHER SPECIFIED CONGENITAL MALFORMATIONS OF HEART: ICD-10-CM

## 2018-09-10 PROCEDURE — 93000 ELECTROCARDIOGRAM COMPLETE: CPT

## 2018-09-10 PROCEDURE — 99215 OFFICE O/P EST HI 40 MIN: CPT

## 2018-09-10 RX ORDER — HYDROCODONE BITARTRATE AND ACETAMINOPHEN 5; 300 MG/1; MG/1
5-300 TABLET ORAL
Qty: 40 | Refills: 0 | Status: DISCONTINUED | COMMUNITY
Start: 2018-05-02 | End: 2018-09-10

## 2019-07-18 ENCOUNTER — OUTPATIENT (OUTPATIENT)
Dept: OUTPATIENT SERVICES | Facility: HOSPITAL | Age: 64
LOS: 1 days | End: 2019-07-18
Payer: COMMERCIAL

## 2019-07-18 ENCOUNTER — APPOINTMENT (OUTPATIENT)
Dept: MAMMOGRAPHY | Facility: IMAGING CENTER | Age: 64
End: 2019-07-18
Payer: COMMERCIAL

## 2019-07-18 DIAGNOSIS — Z98.890 OTHER SPECIFIED POSTPROCEDURAL STATES: Chronic | ICD-10-CM

## 2019-07-18 DIAGNOSIS — Z90.710 ACQUIRED ABSENCE OF BOTH CERVIX AND UTERUS: Chronic | ICD-10-CM

## 2019-07-18 DIAGNOSIS — Z90.49 ACQUIRED ABSENCE OF OTHER SPECIFIED PARTS OF DIGESTIVE TRACT: Chronic | ICD-10-CM

## 2019-07-18 DIAGNOSIS — Z98.1 ARTHRODESIS STATUS: Chronic | ICD-10-CM

## 2019-07-18 DIAGNOSIS — Z00.8 ENCOUNTER FOR OTHER GENERAL EXAMINATION: ICD-10-CM

## 2019-07-18 PROCEDURE — 77067 SCR MAMMO BI INCL CAD: CPT

## 2019-07-18 PROCEDURE — 77063 BREAST TOMOSYNTHESIS BI: CPT | Mod: 26

## 2019-07-18 PROCEDURE — 77063 BREAST TOMOSYNTHESIS BI: CPT

## 2019-07-18 PROCEDURE — 77067 SCR MAMMO BI INCL CAD: CPT | Mod: 26

## 2019-10-23 ENCOUNTER — APPOINTMENT (OUTPATIENT)
Dept: ORTHOPEDIC SURGERY | Facility: CLINIC | Age: 64
End: 2019-10-23
Payer: COMMERCIAL

## 2019-10-23 VITALS
HEIGHT: 65 IN | WEIGHT: 210 LBS | HEART RATE: 65 BPM | BODY MASS INDEX: 34.99 KG/M2 | SYSTOLIC BLOOD PRESSURE: 121 MMHG | DIASTOLIC BLOOD PRESSURE: 77 MMHG

## 2019-10-23 PROCEDURE — 73562 X-RAY EXAM OF KNEE 3: CPT | Mod: 50

## 2019-10-23 PROCEDURE — 99214 OFFICE O/P EST MOD 30 MIN: CPT | Mod: 25

## 2019-10-23 PROCEDURE — 20610 DRAIN/INJ JOINT/BURSA W/O US: CPT | Mod: LT

## 2019-10-23 PROCEDURE — 72170 X-RAY EXAM OF PELVIS: CPT

## 2019-10-23 NOTE — ADDENDUM
[FreeTextEntry1] : I, Wallace Lopez, acted solely as a scribe for Dr. Agus Harrell on this date 10/23/2019.

## 2019-10-23 NOTE — PHYSICAL EXAM
[LE] : Sensory: Intact in bilateral lower extremities [ALL] : dorsalis pedis, posterior tibial, femoral, popliteal, and radial 2+ and symmetric bilaterally [Normal] : Alert and in no acute distress [Antalgic] : not antalgic [Poor Appearance] : well-appearing [Acute Distress] : not in acute distress [de-identified] : GENERAL APPEARANCE: Well nourished and hydrated, pleasant, alert, and oriented x 3. Appears their stated age. \par HEENT: Normocephalic, extraocular eye motion intact. Nasal septum midline. Oral cavity clear. External auditory canal clear. \par RESPIRATORY: Breath sounds clear and audible in all lobes. No wheezing, No accessory muscle use.\par CARDIOVASCULAR: No apparent abnormalities. No lower leg edema. No varicosities. Pedal pulses are palpable.\par NEUROLOGIC: Sensation is normal, no muscle weakness in the upper or lower extremities.\par DERMATOLOGIC: No apparent skin lesions, moist, warm, no rash.\par SPINE: Cervical spine appears normal and moves freely; thoracic spine appears normal and moves freely; lumbosacral spine appears normal and moves freely, normal, nontender.\par MUSCULOSKELETAL: Hands, wrists, and elbows are normal and move freely, shoulders are normal and move freely.  [Obese] : not obese [de-identified] : Bilateral knee exam shows well healed midline incision, no obvious signs of infection, ROM 0 - 120 degrees without pain.\par Left hip exam shows FROM, tenderness over the greater trochanteric bursa.  \par  [de-identified] : 5V Xray of the bilateral knees done in office today and reviewed by Dr. Agus Harrell demonstrates s/p bilateral TKA with implants in good positioning, no sign of wear, loosening or subsidence. \par 1V Xray of the pelvis done in office today and reviewed by Dr. Agus Harrell demonstrates mild bilateral hip osteoarthritis.\par \par MRI of the lumbar spine obtained 8/16/2019 at [facility] shows s/p posterior fusion of L4-5 with intraictal spacer at this level. Grade 1 anterolisthesis of L4 on L5; mild rotary scoliosis. Tiny right parasagittal disc protrusion at L1-L2 unchanged. Small left parasagittal disc protrusion at L2-3 with probable associated osteophytic ridging, prominent left neural foraminal narrowing at this level. Small central and left foraminal disc protrusion at L3-4 with moderate central canal stenosis and prominent left neural foraminal canal narrowing. Mild central canal stenosis at L4-5 primarily due to mild increase in posterior epidural fat.

## 2019-10-23 NOTE — PROCEDURE
[de-identified] : I injected the patient's left hip greater trochanteric bursa with cortisone\par \par I discussed at length with the patient the planned steroid and lidocaine injection for hip bursitis. The risks, benefits, convalescence and alternatives were reviewed and pt consents. The possible side effects discussed included but were not limited to: pain, swelling, heat, bleeding, and redness. Symptoms are generally mild but if they are extensive then contact the office. Giving pain relievers by mouth such as NSAIDs or Tylenol can generally treat the reactions to steroid and lidocaine. Rare cases of infection have been noted. Rash, hives and itching may occur post injection. If you have muscle pain or cramps, flushing and or swelling of the face, rapid heart beat, nausea, dizziness, fever, chills, headache, difficulty breathing, swelling in the arms or legs, or have a prickly feeling of your skin, contact a health care provider immediately. Following this discussion, the hip was prepped with Alcohol and under sterile condition the 80 mg Depo-Medrol and 6 cc Lidocaine injection was performed with a spinal needle through a greater trochanter bursa injection site. The needle was introduced into the bursa and the medication was injected. Upon withdrawal of the needle the site was cleaned with alcohol and a band aid applied. The patient tolerated the injection well and there were no adverse effects. Post injection instructions included no strenuous activity for 24 hours, cryotherapy and if there are any adverse effects to contact the office.

## 2019-10-23 NOTE — DISCUSSION/SUMMARY
[de-identified] : 63 year old  female with mild osteoarthritis of the bilateral hips and greater trochanteric bursitis of the left hip. She is not yet a candidate for left CHEY. Her lateral hip pain may be from her lumbar spine. Patient elected to receive cortisone injection in her left hip greater trochanteric bursa. She is also s/p bilateral TKA DOS 6/8/2017. Xrays were reviewed and the patient was reassured that their TKA components are in good position with no signs of loosening or wear. F/U PRN. \par \par A knee replacement means resurfacing of all 3 surfaces of the patella, the femur, and tibia with metal and plastic parts. The prosthetic parts are usually cemented into position and well outpatient range of motion from full extension to about 120° of flexion. The postoperative motion, however, is determined by multiple factors, the most important of which is preoperative motion. In general, the better motion preoperatively, the better the motion postoperatively. The operation, pending medical clearance, gently requires hospitalization of 1 to 2 days for one knee, 2 to 4 days for bilateral knee replacements. In general, we prefer to perform the procedure under spinal anesthesia with femoral nerve block and occasional single shot sciatic nerve block. We may ask a patient to give 2 units of blood for bilateral total knee arthroplasties, for one knee we institute a normogram which may include administration of preoperative Procrit. The operative procedure takes probably 1-2 hours. The operation requires a straight incision anywhere from 5-7 inches down from the knee. Post-operatively the patient will be walking the day of surgery. The first couple days are very painful and the pain medication will alleviate, but not eliminate the pain. The patient must really push hard to get range of motion. Our goal for having a person go home as that the range of motion is approximately 0-90° of flexion and that they can walk with a walker or cane. A walking aid is to be dispensed once the patient is secure enough. In general there, there is no cast or brace required with routine knee replacement. In the long term, we do not encourage our patients to run for the sake of running, although pending their preoperative status, we often allow patient to play doubles tennis or comparative activities. We also allowed them to do gentle intermediate downhill skiing if they are truly an expert skier. Biking is encouraged as well swimming. The followup periods are usually 3 weeks, 6 weeks, 3 months, and yearly intervals. Potential complications with total knee replacement included anesthetic complications and death, infection around 1%, nerve damage, by which means peroneal nerve palsy, footdrop or flapping foot with ambulation. This is particularly more apt to occur in the patient with a valgus or knock-knee deformity. The incidence can be quite high in this particular patient population. There will be areas of skin numbness, but this is not an untoward effect nor do we consider it a complication. Other potential complications include dislocation of the patella component, usually less than 2%; loosening of the tibial or femoral component is much more infrequent. Most often this occurs with infection or long-term use. Patient of extreme risk including markedly overweight patient's may be more prone to prosthetic wear. Major blood vessel damage is also extremely rare. Directly because of the anatomic proximity of the popliteal artery this could be lacerated with subsequent repair required. Be it unlikely, disruption of the popliteal artery could theoretically result in amputation. Similarly, infection could theoretically result in amputation if one were to grow out of an organism that cannot be controlled with antibiotics. General medical complications include phlebitis, for which we would prophylactically anticoagulate patients, but could still occur, and fatal pulmonary embolus which has been reported. Cardiovascular problems, such as heart attack or ischemia are always a concern with such hemodynamic changes in the blood vascular system. Other General complications are rare, but anything medicine could theoretically happen. I think the patient understands the risk benefit ratio of total knee replacement and will think about whether there like to pursue with an operation or nonoperative treatment program. I reviewed the plan of care as well as a model of a total knee implant equivalent to the one that will be used for their total knee joint replacement. The patient agreed to the plan of care as well as the use of implants for their knee total joint replacement.

## 2019-10-23 NOTE — HISTORY OF PRESENT ILLNESS
[Stable] : stable [4] : a current pain level of 4/10 [Intermit.] : ~He/She~ states the symptoms seem to be intermittent [Bending] : worsened by bending [Walking] : worsened by walking [Recumbency] : relieved by recumbency [Rest] : relieved by rest [de-identified] : 63 year old female here for evaluation of left hip, buttock, and low-back pain. She reports pain extends from left sided low-back down her entire left lower extremity. She underwent MRI of the lumbar spine recently, showing central stenosis, L3-L4 bone edema, already saw spine surgeon. Pt is also s/p bilateral TKA DOS 6/8/2017. She does report locking of the left knee 3 days ago.

## 2020-02-04 ENCOUNTER — INPATIENT (INPATIENT)
Facility: HOSPITAL | Age: 65
LOS: 0 days | Discharge: HOME | DRG: 310 | End: 2020-02-05
Attending: INTERNAL MEDICINE | Admitting: INTERNAL MEDICINE
Payer: COMMERCIAL

## 2020-02-04 VITALS — WEIGHT: 195.11 LBS | HEIGHT: 66 IN | HEART RATE: 133 BPM

## 2020-02-04 DIAGNOSIS — Z98.1 ARTHRODESIS STATUS: Chronic | ICD-10-CM

## 2020-02-04 DIAGNOSIS — I48.91 UNSPECIFIED ATRIAL FIBRILLATION: ICD-10-CM

## 2020-02-04 DIAGNOSIS — Z98.890 OTHER SPECIFIED POSTPROCEDURAL STATES: Chronic | ICD-10-CM

## 2020-02-04 DIAGNOSIS — Z90.710 ACQUIRED ABSENCE OF BOTH CERVIX AND UTERUS: Chronic | ICD-10-CM

## 2020-02-04 DIAGNOSIS — Z90.49 ACQUIRED ABSENCE OF OTHER SPECIFIED PARTS OF DIGESTIVE TRACT: Chronic | ICD-10-CM

## 2020-02-04 LAB
ADD ON TEST-SPECIMEN IN LAB: SIGNIFICANT CHANGE UP
ALBUMIN SERPL ELPH-MCNC: 4.2 G/DL — SIGNIFICANT CHANGE UP (ref 3.3–5)
ALP SERPL-CCNC: 82 U/L — SIGNIFICANT CHANGE UP (ref 40–120)
ALT FLD-CCNC: 45 U/L — SIGNIFICANT CHANGE UP (ref 12–78)
ANION GAP SERPL CALC-SCNC: 6 MMOL/L — SIGNIFICANT CHANGE UP (ref 5–17)
APTT BLD: 34.3 SEC — SIGNIFICANT CHANGE UP (ref 27.5–36.3)
AST SERPL-CCNC: 26 U/L — SIGNIFICANT CHANGE UP (ref 15–37)
BASOPHILS # BLD AUTO: 0.06 K/UL — SIGNIFICANT CHANGE UP (ref 0–0.2)
BASOPHILS NFR BLD AUTO: 0.8 % — SIGNIFICANT CHANGE UP (ref 0–2)
BILIRUB SERPL-MCNC: 0.4 MG/DL — SIGNIFICANT CHANGE UP (ref 0.2–1.2)
BUN SERPL-MCNC: 18 MG/DL — SIGNIFICANT CHANGE UP (ref 7–23)
CALCIUM SERPL-MCNC: 9.7 MG/DL — SIGNIFICANT CHANGE UP (ref 8.5–10.1)
CHLORIDE SERPL-SCNC: 108 MMOL/L — SIGNIFICANT CHANGE UP (ref 96–108)
CK SERPL-CCNC: 119 U/L — SIGNIFICANT CHANGE UP (ref 26–192)
CK SERPL-CCNC: 169 U/L — SIGNIFICANT CHANGE UP (ref 26–192)
CO2 SERPL-SCNC: 27 MMOL/L — SIGNIFICANT CHANGE UP (ref 22–31)
CREAT SERPL-MCNC: 0.98 MG/DL — SIGNIFICANT CHANGE UP (ref 0.5–1.3)
EOSINOPHIL # BLD AUTO: 0.2 K/UL — SIGNIFICANT CHANGE UP (ref 0–0.5)
EOSINOPHIL NFR BLD AUTO: 2.7 % — SIGNIFICANT CHANGE UP (ref 0–6)
GLUCOSE SERPL-MCNC: 107 MG/DL — HIGH (ref 70–99)
HCT VFR BLD CALC: 44.8 % — SIGNIFICANT CHANGE UP (ref 34.5–45)
HGB BLD-MCNC: 14.6 G/DL — SIGNIFICANT CHANGE UP (ref 11.5–15.5)
IMM GRANULOCYTES NFR BLD AUTO: 0.3 % — SIGNIFICANT CHANGE UP (ref 0–1.5)
INR BLD: 0.99 RATIO — SIGNIFICANT CHANGE UP (ref 0.88–1.16)
LYMPHOCYTES # BLD AUTO: 2.57 K/UL — SIGNIFICANT CHANGE UP (ref 1–3.3)
LYMPHOCYTES # BLD AUTO: 34.1 % — SIGNIFICANT CHANGE UP (ref 13–44)
MAGNESIUM SERPL-MCNC: 2 MG/DL — SIGNIFICANT CHANGE UP (ref 1.6–2.6)
MAGNESIUM SERPL-MCNC: 2.2 MG/DL — SIGNIFICANT CHANGE UP (ref 1.6–2.6)
MCHC RBC-ENTMCNC: 31.7 PG — SIGNIFICANT CHANGE UP (ref 27–34)
MCHC RBC-ENTMCNC: 32.6 GM/DL — SIGNIFICANT CHANGE UP (ref 32–36)
MCV RBC AUTO: 97.4 FL — SIGNIFICANT CHANGE UP (ref 80–100)
MONOCYTES # BLD AUTO: 0.68 K/UL — SIGNIFICANT CHANGE UP (ref 0–0.9)
MONOCYTES NFR BLD AUTO: 9 % — SIGNIFICANT CHANGE UP (ref 2–14)
NEUTROPHILS # BLD AUTO: 4 K/UL — SIGNIFICANT CHANGE UP (ref 1.8–7.4)
NEUTROPHILS NFR BLD AUTO: 53.1 % — SIGNIFICANT CHANGE UP (ref 43–77)
NT-PROBNP SERPL-SCNC: 4148 PG/ML — HIGH (ref 0–125)
PLATELET # BLD AUTO: 304 K/UL — SIGNIFICANT CHANGE UP (ref 150–400)
POTASSIUM SERPL-MCNC: 4.7 MMOL/L — SIGNIFICANT CHANGE UP (ref 3.5–5.3)
POTASSIUM SERPL-SCNC: 4.7 MMOL/L — SIGNIFICANT CHANGE UP (ref 3.5–5.3)
PROT SERPL-MCNC: 7.1 GM/DL — SIGNIFICANT CHANGE UP (ref 6–8.3)
PROTHROM AB SERPL-ACNC: 11 SEC — SIGNIFICANT CHANGE UP (ref 10–12.9)
RBC # BLD: 4.6 M/UL — SIGNIFICANT CHANGE UP (ref 3.8–5.2)
RBC # FLD: 13.3 % — SIGNIFICANT CHANGE UP (ref 10.3–14.5)
SODIUM SERPL-SCNC: 141 MMOL/L — SIGNIFICANT CHANGE UP (ref 135–145)
T4 FREE SERPL-MCNC: 0.87 NG/DL — SIGNIFICANT CHANGE UP (ref 0.76–1.46)
TROPONIN I SERPL-MCNC: 0.03 NG/ML — SIGNIFICANT CHANGE UP (ref 0.01–0.04)
TROPONIN I SERPL-MCNC: 0.03 NG/ML — SIGNIFICANT CHANGE UP (ref 0.01–0.04)
TROPONIN I SERPL-MCNC: <0.015 NG/ML — SIGNIFICANT CHANGE UP (ref 0.01–0.04)
TSH SERPL-MCNC: 0.33 UU/ML — LOW (ref 0.34–4.82)
TSH SERPL-MCNC: 0.63 UU/ML — SIGNIFICANT CHANGE UP (ref 0.34–4.82)
WBC # BLD: 7.53 K/UL — SIGNIFICANT CHANGE UP (ref 3.8–10.5)
WBC # FLD AUTO: 7.53 K/UL — SIGNIFICANT CHANGE UP (ref 3.8–10.5)

## 2020-02-04 PROCEDURE — 86803 HEPATITIS C AB TEST: CPT

## 2020-02-04 PROCEDURE — 93306 TTE W/DOPPLER COMPLETE: CPT

## 2020-02-04 PROCEDURE — 84443 ASSAY THYROID STIM HORMONE: CPT

## 2020-02-04 PROCEDURE — 84484 ASSAY OF TROPONIN QUANT: CPT

## 2020-02-04 PROCEDURE — 36415 COLL VENOUS BLD VENIPUNCTURE: CPT

## 2020-02-04 PROCEDURE — 84100 ASSAY OF PHOSPHORUS: CPT

## 2020-02-04 PROCEDURE — 92960 CARDIOVERSION ELECTRIC EXT: CPT

## 2020-02-04 PROCEDURE — 71045 X-RAY EXAM CHEST 1 VIEW: CPT | Mod: 26

## 2020-02-04 PROCEDURE — 93010 ELECTROCARDIOGRAM REPORT: CPT

## 2020-02-04 PROCEDURE — 84439 ASSAY OF FREE THYROXINE: CPT

## 2020-02-04 PROCEDURE — 80048 BASIC METABOLIC PNL TOTAL CA: CPT

## 2020-02-04 PROCEDURE — 99497 ADVNCD CARE PLAN 30 MIN: CPT | Mod: 25

## 2020-02-04 PROCEDURE — 84481 FREE ASSAY (FT-3): CPT

## 2020-02-04 PROCEDURE — 93325 DOPPLER ECHO COLOR FLOW MAPG: CPT

## 2020-02-04 PROCEDURE — 82550 ASSAY OF CK (CPK): CPT

## 2020-02-04 PROCEDURE — 83036 HEMOGLOBIN GLYCOSYLATED A1C: CPT

## 2020-02-04 PROCEDURE — 83735 ASSAY OF MAGNESIUM: CPT

## 2020-02-04 PROCEDURE — 80061 LIPID PANEL: CPT

## 2020-02-04 PROCEDURE — 93005 ELECTROCARDIOGRAM TRACING: CPT

## 2020-02-04 PROCEDURE — 93312 ECHO TRANSESOPHAGEAL: CPT

## 2020-02-04 PROCEDURE — 99223 1ST HOSP IP/OBS HIGH 75: CPT

## 2020-02-04 PROCEDURE — 93320 DOPPLER ECHO COMPLETE: CPT

## 2020-02-04 PROCEDURE — 71045 X-RAY EXAM CHEST 1 VIEW: CPT

## 2020-02-04 RX ORDER — ALLOPURINOL 300 MG
300 TABLET ORAL DAILY
Refills: 0 | Status: DISCONTINUED | OUTPATIENT
Start: 2020-02-04 | End: 2020-02-05

## 2020-02-04 RX ORDER — RANITIDINE HYDROCHLORIDE 150 MG/1
1 TABLET, FILM COATED ORAL
Qty: 0 | Refills: 0 | DISCHARGE

## 2020-02-04 RX ORDER — ASPIRIN/CALCIUM CARB/MAGNESIUM 324 MG
325 TABLET ORAL ONCE
Refills: 0 | Status: COMPLETED | OUTPATIENT
Start: 2020-02-04 | End: 2020-02-04

## 2020-02-04 RX ORDER — ALPRAZOLAM 0.25 MG
0.25 TABLET ORAL ONCE
Refills: 0 | Status: DISCONTINUED | OUTPATIENT
Start: 2020-02-04 | End: 2020-02-04

## 2020-02-04 RX ORDER — SENNA PLUS 8.6 MG/1
2 TABLET ORAL AT BEDTIME
Refills: 0 | Status: DISCONTINUED | OUTPATIENT
Start: 2020-02-04 | End: 2020-02-05

## 2020-02-04 RX ORDER — APIXABAN 2.5 MG/1
5 TABLET, FILM COATED ORAL EVERY 12 HOURS
Refills: 0 | Status: DISCONTINUED | OUTPATIENT
Start: 2020-02-04 | End: 2020-02-05

## 2020-02-04 RX ORDER — ONDANSETRON 8 MG/1
4 TABLET, FILM COATED ORAL EVERY 6 HOURS
Refills: 0 | Status: DISCONTINUED | OUTPATIENT
Start: 2020-02-04 | End: 2020-02-05

## 2020-02-04 RX ORDER — ATORVASTATIN CALCIUM 80 MG/1
40 TABLET, FILM COATED ORAL AT BEDTIME
Refills: 0 | Status: DISCONTINUED | OUTPATIENT
Start: 2020-02-04 | End: 2020-02-05

## 2020-02-04 RX ORDER — SODIUM CHLORIDE 9 MG/ML
1000 INJECTION INTRAMUSCULAR; INTRAVENOUS; SUBCUTANEOUS
Refills: 0 | Status: DISCONTINUED | OUTPATIENT
Start: 2020-02-04 | End: 2020-02-05

## 2020-02-04 RX ORDER — ALPRAZOLAM 0.25 MG
0.5 TABLET ORAL ONCE
Refills: 0 | Status: DISCONTINUED | OUTPATIENT
Start: 2020-02-04 | End: 2020-02-04

## 2020-02-04 RX ORDER — ACETAMINOPHEN 500 MG
650 TABLET ORAL EVERY 6 HOURS
Refills: 0 | Status: DISCONTINUED | OUTPATIENT
Start: 2020-02-04 | End: 2020-02-05

## 2020-02-04 RX ORDER — DILTIAZEM HCL 120 MG
5 CAPSULE, EXT RELEASE 24 HR ORAL
Qty: 125 | Refills: 0 | Status: DISCONTINUED | OUTPATIENT
Start: 2020-02-04 | End: 2020-02-05

## 2020-02-04 RX ORDER — SODIUM CHLORIDE 9 MG/ML
1000 INJECTION INTRAMUSCULAR; INTRAVENOUS; SUBCUTANEOUS ONCE
Refills: 0 | Status: COMPLETED | OUTPATIENT
Start: 2020-02-04 | End: 2020-02-04

## 2020-02-04 RX ORDER — BUPROPION HYDROCHLORIDE 150 MG/1
1 TABLET, EXTENDED RELEASE ORAL
Qty: 0 | Refills: 0 | DISCHARGE

## 2020-02-04 RX ORDER — METOPROLOL TARTRATE 50 MG
5 TABLET ORAL EVERY 6 HOURS
Refills: 0 | Status: DISCONTINUED | OUTPATIENT
Start: 2020-02-04 | End: 2020-02-05

## 2020-02-04 RX ORDER — ASPIRIN/CALCIUM CARB/MAGNESIUM 324 MG
81 TABLET ORAL DAILY
Refills: 0 | Status: DISCONTINUED | OUTPATIENT
Start: 2020-02-05 | End: 2020-02-05

## 2020-02-04 RX ORDER — SODIUM CHLORIDE 9 MG/ML
500 INJECTION INTRAMUSCULAR; INTRAVENOUS; SUBCUTANEOUS ONCE
Refills: 0 | Status: COMPLETED | OUTPATIENT
Start: 2020-02-04 | End: 2020-02-04

## 2020-02-04 RX ORDER — DILTIAZEM HCL 120 MG
10 CAPSULE, EXT RELEASE 24 HR ORAL ONCE
Refills: 0 | Status: COMPLETED | OUTPATIENT
Start: 2020-02-04 | End: 2020-02-04

## 2020-02-04 RX ADMIN — Medication 0.5 MILLIGRAM(S): at 12:50

## 2020-02-04 RX ADMIN — SODIUM CHLORIDE 1000 MILLILITER(S): 9 INJECTION INTRAMUSCULAR; INTRAVENOUS; SUBCUTANEOUS at 13:40

## 2020-02-04 RX ADMIN — Medication 325 MILLIGRAM(S): at 12:50

## 2020-02-04 RX ADMIN — Medication 0.25 MILLIGRAM(S): at 23:23

## 2020-02-04 RX ADMIN — APIXABAN 5 MILLIGRAM(S): 2.5 TABLET, FILM COATED ORAL at 15:35

## 2020-02-04 RX ADMIN — SODIUM CHLORIDE 75 MILLILITER(S): 9 INJECTION INTRAMUSCULAR; INTRAVENOUS; SUBCUTANEOUS at 18:45

## 2020-02-04 RX ADMIN — Medication 5 MG/HR: at 18:45

## 2020-02-04 RX ADMIN — SODIUM CHLORIDE 500 MILLILITER(S): 9 INJECTION INTRAMUSCULAR; INTRAVENOUS; SUBCUTANEOUS at 23:20

## 2020-02-04 RX ADMIN — ATORVASTATIN CALCIUM 40 MILLIGRAM(S): 80 TABLET, FILM COATED ORAL at 22:09

## 2020-02-04 RX ADMIN — Medication 650 MILLIGRAM(S): at 18:43

## 2020-02-04 RX ADMIN — SODIUM CHLORIDE 1000 MILLILITER(S): 9 INJECTION INTRAMUSCULAR; INTRAVENOUS; SUBCUTANEOUS at 15:00

## 2020-02-04 RX ADMIN — Medication 10 MILLIGRAM(S): at 12:50

## 2020-02-04 RX ADMIN — SODIUM CHLORIDE 75 MILLILITER(S): 9 INJECTION INTRAMUSCULAR; INTRAVENOUS; SUBCUTANEOUS at 15:44

## 2020-02-04 NOTE — H&P ADULT - NSICDXPASTSURGICALHX_GEN_ALL_CORE_FT
PAST SURGICAL HISTORY:  S/P carpal tunnel release R    S/P cholecystectomy     S/P hernia repair     S/P hysterectomy     S/P lumbar fusion     S/P shoulder surgery bilat rotator cuff    S/P thyroid surgery

## 2020-02-04 NOTE — H&P ADULT - NSHPREVIEWOFSYSTEMS_GEN_ALL_CORE
REVIEW OF SYSTEMS:    CONSTITUTIONAL: No weakness, fevers or chills  EYES/ENT: No visual changes;  No vertigo or throat pain   NECK: No pain or stiffness  RESPIRATORY: No cough, wheezing, hemoptysis; +  shortness of breath  CARDIOVASCULAR: no chest pain +  palpitations  GASTROINTESTINAL: No abdominal or epigastric pain. No nausea, vomiting, or hematemesis; No diarrhea or constipation. No melena or hematochezia.  GENITOURINARY: No dysuria, frequency or hematuria  NEUROLOGICAL: No numbness or weakness  SKIN: No itching, burning, rashes, or lesions   All other review of systems is negative unless indicated above.

## 2020-02-04 NOTE — H&P ADULT - ASSESSMENT
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 Quesada's office for EKG showing A-fib. Patient reports 3 days history of feeling dizzy , lightheaded, episodes of palpitations , associated with dyspnea on exertion and on and off mild headaches . Patient took  Lopressor 100, valsartan today in am.  Patient also reports bilateral jaw pain R>L  on and off , but denies chest pain, cough, dyspnea at rest.  Since may 2019 pt lost 60 pounds with diet and exercise. Patient reports remote history of arrhythmia in past. Denies recent illness etoh use, caffeine use, diarrhea, urinary sx, abdominal pain, + chronic back pain, + left thigh numbness needs another spine surgery, left hand numbness h/o carpel tunnel syndrome s/p right hand surgery.     In ED - T(F): 97.8  Max: 97.8 HR: 98(98 - 133) BP: 91/57  (70/47 - 129/89) RR: 16  (16 - 20) SpO2: 99%(99% - 100%) EKG - a fib in 120 s/p ASA, xanax, cardizem 10 IV with some drop in SBP to 80 th , s/p IV fluids    * Dizziness, palpitations due to Atrial fibrillation with RVR   * PFO s/p closure  * h/o CVA   - tele  - serial troponin  - IV fluids  - hold PO ARB, BB  - start cardizem drip, IV lopressor prn for uncontrolled HR   - start AC - CHADS- VASC score 5 - high risk - risk 6.7 %  - start Eliquis  - hold plavix , c/w ASA, statin  - check TSH, free T4, lipid profile, A1C   - cardiology consult d/w Dr. Quesada - plan for cardioversion in am, NPO from midnight  - 2 d echo - noted elevated BNP  - serial troponin     * HTN  - hold valsartan and metoprolol 100 bid  - c/w IV cardizem drip and IV lopressor prn  - hold lasix 20 - pt was taking it for BP control as per PCP     * Gout  - c/w allopurinol    * Chronic back pain  - c/w tylenol prn    Advanced directives  - spend 17 min  - FULL code    DVT proph - Eliquis  IMPROVE VTE Individual Risk Assessment  RISK                                                                Points  [  ] Previous VTE                                                  3  [  ] Thrombophilia                                               2  [  ] Lower limb paralysis                                      2        (unable to hold up >15 seconds)    [  ] Current Cancer                                              2         (within 6 months)  [  ] Immobilization > 24 hrs                                1  [  ] ICU/CCU stay > 24 hours                              1  [ x ] Age > 60                                                      1  IMPROVE VTE Score ______1___    IMPROVE Score 0-1: Low Risk, No VTE prophylaxis required for most patients, encourage ambulation.   IMPROVE Score 2-3: At risk, pharmacologic VTE prophylaxis is indicated for most patients (in the absence of a contraindication)  IMPROVE Score > or = 4: High Risk, pharmacologic VTE prophylaxis is indicated for most patients (in the absence of a contraindication)    Time spend 82 minutes

## 2020-02-04 NOTE — ED PROVIDER NOTE - OBJECTIVE STATEMENT
63 y/o female with a PMHx of ASD, CVA on Plavix, H/O PFO, HTN, Loop recorder, presents to the ED sent from Dr. Quesada's office for EKG showing A-fib. States after she ate dinner 3 days ago, she felt sudden dizziness, a "shooting headache". Took Lopressor and ASA 81mg after episode. Reports she has been lightheaded and dizzy since then. Denies chest pain, shortness of breath. Recently lost 60 pounds with diet and exercise. No known dx of A-fib before. ASA 81mg daily. NKDA. Former cigarette smoker, quit 4 years but currently vapes. PMD: Dr. Najma Caballero. 65 y/o female with a PMHx of ASD, CVA on Plavix, H/O PFO, HTN, Loop recorder, presents to the ED sent from Dr. Quesada's office for EKG showing A-fib. States after she ate dinner 3 days ago, she felt sudden dizziness, a "shooting headache". Took Lopressor and ASA 81mg after episode. Reports she has been lightheaded and dizzy since then. Denies chest pain, shortness of breath. Recently lost 60 pounds with diet and exercise. Hx of arrthymia in past; ASA 81mg daily. NKDA. Former cigarette smoker, quit 4 years but currently vapes. PMD: Dr. Najma Caballero.

## 2020-02-04 NOTE — H&P ADULT - HISTORY OF PRESENT ILLNESS
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 Quesada's office for EKG showing A-fib. Patient reports 3 days history of feeling dizzy , lightheaded, episodes of palpitations , associated with dyspnea on exertion and on and off mild headaches . Patient took  Lopressor 100, valsartan today in am.  Patient also reports bilateral jaw pain R>L  on and off , but denies chest pain, cough, dyspnea at rest.  Since may 2019 pt lost 60 pounds with diet and exercise. Patient reports remote history of arrhythmia in past. Denies recent illness etoh use, caffeine use, diarrhea, urinary sx, abdominal pain, + chronic back pain, + left thigh numbness needs another spine surgery, left hand numbness h/o carpel tunnel syndrome s/p right hand surgery.     In ED - T(F): 97.8  Max: 97.8 HR: 98(98 - 133) BP: 91/57  (70/47 - 129/89) RR: 16  (16 - 20) SpO2: 99%(99% - 100%) EKG - a fib in 120 s/p ASA, xanax, cardizem 10 IV with some drop in SBP to 80 th , s/p IV fluids

## 2020-02-04 NOTE — ED ADULT NURSE NOTE - CHIEF COMPLAINT QUOTE
Patient comes in from Dr. Quesada office for afib. Patient states that on Saturday she had a racing heart rate and irregular beats. Patient followed up with Dr. Quesada today which did EKG, showing afib. MD called ahead. Patient heart rate at triage 133. Denies chest pain, sob.

## 2020-02-04 NOTE — ED PROVIDER NOTE - CLINICAL SUMMARY MEDICAL DECISION MAKING FREE TEXT BOX
65 y/o female with A-fib set for cardioversion, EKG, labs, and admit 63 y/o female with A-fib; sent for cardioversion, EKG, labs, and admit

## 2020-02-04 NOTE — ED ADULT TRIAGE NOTE - CHIEF COMPLAINT QUOTE
Patient comes in from Dr. uQesada office for afib. Patient states that on Saturday she had a racing heart rate and irregular beats. Patient followed up with Dr. Quesada today which did EKG, showing afib. MD called ahead. Patient heart rate at triage 133. Denies chest pain, sob.

## 2020-02-04 NOTE — H&P ADULT - NSICDXPASTMEDICALHX_GEN_ALL_CORE_FT
PAST MEDICAL HISTORY:  Chronic back pain     CVA, old, dysarthria     Gout     Hypertension     OA (osteoarthritis)     PFO (patent foramen ovale)     Thyroid nodule

## 2020-02-05 ENCOUNTER — TRANSCRIPTION ENCOUNTER (OUTPATIENT)
Age: 65
End: 2020-02-05

## 2020-02-05 VITALS — SYSTOLIC BLOOD PRESSURE: 131 MMHG | DIASTOLIC BLOOD PRESSURE: 68 MMHG | HEART RATE: 72 BPM

## 2020-02-05 LAB
ANION GAP SERPL CALC-SCNC: 5 MMOL/L — SIGNIFICANT CHANGE UP (ref 5–17)
BUN SERPL-MCNC: 13 MG/DL — SIGNIFICANT CHANGE UP (ref 7–23)
CALCIUM SERPL-MCNC: 8.9 MG/DL — SIGNIFICANT CHANGE UP (ref 8.5–10.1)
CHLORIDE SERPL-SCNC: 116 MMOL/L — HIGH (ref 96–108)
CHOLEST SERPL-MCNC: 93 MG/DL — SIGNIFICANT CHANGE UP (ref 10–199)
CK SERPL-CCNC: 103 U/L — SIGNIFICANT CHANGE UP (ref 26–192)
CO2 SERPL-SCNC: 24 MMOL/L — SIGNIFICANT CHANGE UP (ref 22–31)
CREAT SERPL-MCNC: 0.71 MG/DL — SIGNIFICANT CHANGE UP (ref 0.5–1.3)
GLUCOSE SERPL-MCNC: 101 MG/DL — HIGH (ref 70–99)
HBA1C BLD-MCNC: 5.6 % — SIGNIFICANT CHANGE UP (ref 4–5.6)
HCV AB S/CO SERPL IA: 0.15 S/CO — SIGNIFICANT CHANGE UP (ref 0–0.99)
HCV AB SERPL-IMP: SIGNIFICANT CHANGE UP
HDLC SERPL-MCNC: 37 MG/DL — LOW
LIPID PNL WITH DIRECT LDL SERPL: 37 MG/DL — SIGNIFICANT CHANGE UP
MAGNESIUM SERPL-MCNC: 2.2 MG/DL — SIGNIFICANT CHANGE UP (ref 1.6–2.6)
POTASSIUM SERPL-MCNC: 4.4 MMOL/L — SIGNIFICANT CHANGE UP (ref 3.5–5.3)
POTASSIUM SERPL-SCNC: 4.4 MMOL/L — SIGNIFICANT CHANGE UP (ref 3.5–5.3)
SODIUM SERPL-SCNC: 145 MMOL/L — SIGNIFICANT CHANGE UP (ref 135–145)
TOTAL CHOLESTEROL/HDL RATIO MEASUREMENT: 2.5 RATIO — LOW (ref 3.3–7.1)
TRIGL SERPL-MCNC: 98 MG/DL — SIGNIFICANT CHANGE UP (ref 10–149)
TROPONIN I SERPL-MCNC: 0.02 NG/ML — SIGNIFICANT CHANGE UP (ref 0.01–0.04)

## 2020-02-05 PROCEDURE — 99239 HOSP IP/OBS DSCHRG MGMT >30: CPT

## 2020-02-05 PROCEDURE — 93010 ELECTROCARDIOGRAM REPORT: CPT

## 2020-02-05 RX ORDER — METOPROLOL TARTRATE 50 MG
1 TABLET ORAL
Qty: 60 | Refills: 0
Start: 2020-02-05 | End: 2020-03-05

## 2020-02-05 RX ORDER — VALSARTAN 80 MG/1
1 TABLET ORAL
Qty: 0 | Refills: 0 | DISCHARGE

## 2020-02-05 RX ORDER — CLOPIDOGREL BISULFATE 75 MG/1
1 TABLET, FILM COATED ORAL
Qty: 0 | Refills: 0 | DISCHARGE

## 2020-02-05 RX ORDER — APIXABAN 2.5 MG/1
1 TABLET, FILM COATED ORAL
Qty: 60 | Refills: 0
Start: 2020-02-05 | End: 2020-03-05

## 2020-02-05 RX ORDER — METOPROLOL TARTRATE 50 MG
1 TABLET ORAL
Qty: 0 | Refills: 0 | DISCHARGE

## 2020-02-05 RX ORDER — ACETAMINOPHEN 500 MG
2 TABLET ORAL
Qty: 0 | Refills: 0 | DISCHARGE

## 2020-02-05 RX ADMIN — Medication 81 MILLIGRAM(S): at 13:23

## 2020-02-05 RX ADMIN — APIXABAN 5 MILLIGRAM(S): 2.5 TABLET, FILM COATED ORAL at 06:18

## 2020-02-05 RX ADMIN — SODIUM CHLORIDE 75 MILLILITER(S): 9 INJECTION INTRAMUSCULAR; INTRAVENOUS; SUBCUTANEOUS at 08:48

## 2020-02-05 RX ADMIN — Medication 300 MILLIGRAM(S): at 13:23

## 2020-02-05 NOTE — DISCHARGE NOTE PROVIDER - HOSPITAL COURSE
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 Quesada's office for EKG showing A-fib. Patient reports 3 days history of feeling dizzy , lightheaded, episodes of palpitations , associated with dyspnea on exertion and on and off mild headaches . Patient took  Lopressor 100, valsartan today in am.  Patient also reports bilateral jaw pain R>L  on and off , but denies chest pain, cough, dyspnea at rest.  Since may 2019 pt lost 60 pounds with diet and exercise. Patient reports remote history of arrhythmia in past. Denies recent illness etoh use, caffeine use, diarrhea, urinary sx, abdominal pain, + chronic back pain, + left thigh numbness needs another spine surgery, left hand numbness h/o carpel tunnel syndrome s/p right hand surgery.     Hosp course: s/p successful; DCCV now in nsr on eliquis. BP borderline. PT has no complaints. D/W cardiology-> plan for continuation of eliquis, hold plavix and continue asa. Stop ARB and reduce BB by 50%. Pt to follow up with cardiology in 1 week        Total dc time: 61 min. PCP aware        In ED - T(F): 97.8  Max: 97.8 HR: 98(98 - 133) BP: 91/57  (70/47 - 129/89) RR: 16  (16 - 20) SpO2: 99%(99% - 100%) EKG - a fib in 120 s/p ASA, xanax, cardizem 10 IV with some drop in SBP to 80 th , s/p IV fluids (04 Feb 2020 14:31)                REVIEW OF SYSTEMS:        CONSTITUTIONAL: No weakness, fevers or chills    EYES/ENT: No visual changes;  No vertigo or throat pain     NECK: No pain or stiffness    RESPIRATORY: No cough, wheezing, hemoptysis; No shortness of breath    CARDIOVASCULAR: No chest pain or palpitations    GASTROINTESTINAL: No abdominal or epigastric pain. No nausea, vomiting, or hematemesis; No diarrhea or constipation. No melena or hematochezia.    GENITOURINARY: No dysuria, frequency or hematuria    NEUROLOGICAL: No numbness or weakness    SKIN: No itching, burning, rashes, or lesions     All other review of systems is negative unless indicated above        Height (cm): 165.1 (02-04 @ 12:33)    Weight (kg): 91.7 (02-04 @ 17:26)    BMI (kg/m2): 33.6 (02-04 @ 17:26)    BSA (m2): 1.99 (02-04 @ 17:26)    ICU Vital Signs Last 24 Hrs    T(C): 36.6 (05 Feb 2020 09:27), Max: 36.9 (04 Feb 2020 17:26)    T(F): 97.8 (05 Feb 2020 09:27), Max: 98.4 (04 Feb 2020 17:26)    HR: 64 (05 Feb 2020 09:55) (64 - 133)    BP: 100/55 (05 Feb 2020 10:05) (70/47 - 129/89)    BP(mean): 72 (04 Feb 2020 14:57) (72 - 72)    ABP: --    ABP(mean): --    RR: 17 (05 Feb 2020 09:55) (16 - 20)    SpO2: 100% (05 Feb 2020 09:55) (95% - 100%)                PHYSICAL EXAM:        Constitutional: NAD, awake and alert, well-developed    HEENT: PERR, EOMI, Normal Hearing, MMM    Neck: Soft and supple, No LAD, No JVD    Respiratory: Breath sounds are clear bilaterally, No wheezing, rales or rhonchi    Cardiovascular: S1 and S2, regular rate and rhythm, no Murmurs, gallops or rubs    Gastrointestinal: Bowel Sounds present, soft, nontender, nondistended, no guarding, no rebound    Extremities: No peripheral edema    Vascular: 2+ peripheral pulses    Neurological: A/O x 3, no focal deficits    Musculoskeletal: 5/5 strength b/l upper and lower extremities    Skin: No rashes            LABS: All Labs Reviewed:                            14.6     7.53  )-----------( 304      ( 04 Feb 2020 12:09 )               44.8         02-05        145  |  116<H>  |  13    ----------------------------<  101<H>    4.4   |  24  |  0.71        Ca    8.9      05 Feb 2020 08:31    Phos  2.9     02-05    Mg     2.2     02-05        TPro  7.1  /  Alb  4.2  /  TBili  0.4  /  DBili  x   /  AST  26  /  ALT  45  /  AlkPhos  82  02-04        PT/INR - ( 04 Feb 2020 12:09 )   PT: 11.0 sec;   INR: 0.99 ratio               PTT - ( 04 Feb 2020 12:09 )  PTT:34.3 sec    CARDIAC MARKERS ( 05 Feb 2020 00:34 )    0.024 ng/mL / x     / 103 U/L / x     / x        CARDIAC MARKERS ( 04 Feb 2020 20:06 )

## 2020-02-05 NOTE — CONSULT NOTE ADULT - SUBJECTIVE AND OBJECTIVE BOX
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 Quesada's office for EKG showing A-fib. Patient reports 3 days history of feeling dizzy , lightheaded, episodes of palpitations , associated with dyspnea on exertion and on and off mild headaches . Patient took  Lopressor 100, valsartan today in am.  Patient also reports bilateral jaw pain R>L  on and off , but denies chest pain, cough, dyspnea at rest.  Since may 2019 pt lost 60 pounds with diet and exercise. Patient reports remote history of arrhythmia in past. Denies recent illness etoh use, caffeine use, diarrhea, urinary sx, abdominal pain, + chronic back pain, + left thigh numbness needs another spine surgery, left hand numbness h/o carpel tunnel syndrome s/p right hand surgery.   In ED - T(F): 97.8  Max: 97.8 HR: 98(98 - 133) BP: 91/57  (70/47 - 129/89) RR: 16  (16 - 20) SpO2: 99%(99% - 100%) EKG - a fib in 120 s/p ASA, xanax, cardizem 10 IV with some drop in SBP to 80 th , s/p IV fluids (04 Feb 2020 14:31)    2/5. Tele- afib with -140s.  Case d/w pt and  at bedside.  Awaiting MER/CV today.    PAST MEDICAL & SURGICAL HISTORY:  OA (osteoarthritis)  Chronic back pain  Gout  PFO (patent foramen ovale)  CVA, old, dysarthria  Thyroid nodule  Hypertension  S/P hysterectomy  S/P carpal tunnel release: R  S/P thyroid surgery  S/P hernia repair  S/P shoulder surgery: bilat rotator cuff  S/P lumbar fusion  S/P cholecystectomy    Allergies  No Known Allergies    SOCIAL HISTORY: Denies tobacco, etoh abuse or illicit drug use    FAMILY HISTORY:  Family history of coronary artery disease  Family history of lung cancer    MEDICATIONS  (STANDING):  allopurinol 300 milliGRAM(s) Oral daily  apixaban 5 milliGRAM(s) Oral every 12 hours  aspirin enteric coated 81 milliGRAM(s) Oral daily  atorvastatin 40 milliGRAM(s) Oral at bedtime  diltiazem Infusion 5 mG/Hr (5 mL/Hr) IV Continuous <Continuous>  sodium chloride 0.9%. 1000 milliLiter(s) (75 mL/Hr) IV Continuous <Continuous>    MEDICATIONS  (PRN):  acetaminophen   Tablet .. 650 milliGRAM(s) Oral every 6 hours PRN Temp greater or equal to 38C (100.4F), Mild Pain (1 - 3)  aluminum hydroxide/magnesium hydroxide/simethicone Suspension 30 milliLiter(s) Oral every 4 hours PRN Dyspepsia  metoprolol tartrate Injectable 5 milliGRAM(s) IV Push every 6 hours PRN for HR > 120 hold for SBP <110  ondansetron Injectable 4 milliGRAM(s) IV Push every 6 hours PRN Nausea  senna 2 Tablet(s) Oral at bedtime PRN Constipation    Vital Signs Last 24 Hrs  T(C): 36.3 (05 Feb 2020 06:09), Max: 36.9 (04 Feb 2020 17:26)  T(F): 97.4 (05 Feb 2020 06:09), Max: 98.4 (04 Feb 2020 17:26)  HR: 120 (05 Feb 2020 06:09) (68 - 133)  BP: 107/69 (05 Feb 2020 06:09) (70/47 - 129/89)  BP(mean): 72 (04 Feb 2020 14:57) (72 - 72)  RR: 18 (05 Feb 2020 06:09) (16 - 20)  SpO2: 98% (05 Feb 2020 06:09) (95% - 100%)    REVIEW OF SYSTEMS:    CONSTITUTIONAL:  As per HPI.  HEENT:  Eyes:  No diplopia or blurred vision. ENT:  No earache, sore throat or runny nose.  CARDIOVASCULAR:  No pressure, squeezing, strangling, tightness, heaviness or aching about the chest, neck, axilla or epigastrium. +Palpitations  RESPIRATORY:  +SOB.   GASTROINTESTINAL:  No nausea, vomiting or diarrhea.  MUSCULOSKELETAL:  As per HPI.  NEUROLOGIC:  No paresthesias, fasciculations, seizures or weakness.    PHYSICAL EXAMINATION:    GENERAL APPEARANCE:  Pt. is not currently dyspneic, in no distress. Pt. is alert, oriented, and pleasant.  HEENT:  Pupils are normal and react normally. No icterus. Mucous membranes well colored.  NECK:  Supple. No lymphadenopathy. Jugular venous pressure not elevated. Carotids equal.   HEART:   The cardiac impulse has a normal quality. There are no murmurs, rubs or gallops noted. Irregular rhythm, tachy.  CHEST:  Chest is clear to auscultation. Normal respiratory effort.  ABDOMEN:  Soft and nontender.   EXTREMITIES:  There is no edema.     LABS:                        14.6   7.53  )-----------( 304      ( 04 Feb 2020 12:09 )             44.8     02-04    141  |  108  |  18  ----------------------------<  107<H>  4.7   |  27  |  0.98    Ca    9.7      04 Feb 2020 12:09  Mg     2.0     02-04    TPro  7.1  /  Alb  4.2  /  TBili  0.4  /  DBili  x   /  AST  26  /  ALT  45  /  AlkPhos  82  02-04    LIVER FUNCTIONS - ( 04 Feb 2020 12:09 )  Alb: 4.2 g/dL / Pro: 7.1 gm/dL / ALK PHOS: 82 U/L / ALT: 45 U/L / AST: 26 U/L / GGT: x           PT/INR - ( 04 Feb 2020 12:09 )   PT: 11.0 sec;   INR: 0.99 ratio      PTT - ( 04 Feb 2020 12:09 )  PTT:34.3 sec  CARDIAC MARKERS ( 05 Feb 2020 00:34 )  0.024 ng/mL / x     / 103 U/L / x     / x      CARDIAC MARKERS ( 04 Feb 2020 20:06 )  0.027 ng/mL / x     / x     / x     / x      CARDIAC MARKERS ( 04 Feb 2020 15:36 )  0.025 ng/mL / x     / 119 U/L / x     / x      CARDIAC MARKERS ( 04 Feb 2020 12:09 )  <0.015 ng/mL / x     / 169 U/L / x     / x        EKG: < from: 12 Lead ECG (02.04.20 @ 11:50) >  Atrial fibrillation with rapid ventricular response  Nonspecific ST and T wave abnormality  Abnormal ECG    TELEMETRY: Afib with RVR    CARDIAC TESTS: pending    RADIOLOGY & ADDITIONAL STUDIES: < from: Xray Chest 1 View- PORTABLE-Urgent (02.04.20 @ 13:32) >  IMPRESSION: Loop recorder. Shoulder degeneration right greater than left.     ASSESSMENT & PLAN:

## 2020-02-05 NOTE — DISCHARGE NOTE PROVIDER - NSDCMRMEDTOKEN_GEN_ALL_CORE_FT
allopurinol 300 mg oral tablet: 1 tab(s) orally once a day  apixaban 5 mg oral tablet: 1 tab(s) orally every 12 hours  aspirin 81 mg oral delayed release tablet: 1 tab(s) orally once a day  atorvastatin 40 mg oral tablet: 1 tab(s) orally once a day  Co Q-10 100 mg oral capsule: 1 cap(s) orally once a day  Folbic oral tablet: 1 tab(s) orally once a day  Hydrocort 2.5% topical cream: Apply topically to affected area 2 times a day, As Needed  Lasix 20 mg oral tablet: 1 tab(s) orally once a day  Lopressor 50 mg oral tablet: 1 tab(s) orally 2 times a day   Vitamin D2 50,000 intl units (1.25 mg) oral capsule: 1 cap(s) orally once a week on Sunday

## 2020-02-05 NOTE — DISCHARGE NOTE PROVIDER - CARE PROVIDER_API CALL
Adan Quesada (MD)  Cardiovascular Disease; Nuclear Cardiology  [address] [place] [address]  Phone: [phone]  Fax: [phone]  Follow Up Time:

## 2020-02-05 NOTE — PROCEDURAL SAFETY CHECKLIST WITH OR WITHOUT SEDATION - NSPOSTCOMMENTFT_GEN_ALL_CORE
Outpatient bedside MER/CV performed. All safety equipment in place and ready at bedside. Brief/Time out performed at bedside with all team members present @0934a, anesthesia start time @0935a, probe in @0936a, bubbles administered @0941a, probe out @0944a. CV performed at @0945a, 200J administered, 1 shock given, pt now in normal sinus rhythm. Anesthesia stop time @0955a.  Dr Felix discussed findings with patient. Report given to SRINIVAS Panda. Pt confirmed to be on tele monitor prior to transport.

## 2020-02-05 NOTE — CONSULT NOTE ADULT - ASSESSMENT
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    1. AFib with RVR. CHADS VASC score 5 - high risk - risk 6.7 %- continue Eliquis.  D/C plavix, cont asa/statin.   TFTs pending. 2Decho pending.  Awaiting MER/CV today. Keep NPO. Trops negative.  Cont cardizem gtt for now if BP allows. HR remains elevated 120s at present.    2. Dizziness/palpitations. Due to afib. See above.    3. HTN. Cont cardizem gtt for now. Will readjust BP meds as need post CV.    4. DVT proph. Replete lytes as needed.

## 2020-02-05 NOTE — DISCHARGE NOTE NURSING/CASE MANAGEMENT/SOCIAL WORK - PATIENT PORTAL LINK FT
You can access the FollowMyHealth Patient Portal offered by [facility] by registering at the following website: [email or web address]. By joining [employer]’s FollowMyHealth portal, you will also be able to view your health information using other applications (apps) compatible with our system.

## 2020-02-06 PROBLEM — M19.90 UNSPECIFIED OSTEOARTHRITIS, UNSPECIFIED SITE: Chronic | Status: ACTIVE | Noted: 2020-02-04

## 2020-02-06 PROBLEM — M54.9 DORSALGIA, UNSPECIFIED: Chronic | Status: ACTIVE | Noted: 2020-02-04

## 2020-02-06 PROBLEM — Q21.1 ATRIAL SEPTAL DEFECT: Chronic | Status: ACTIVE | Noted: 2020-02-04

## 2020-02-06 PROBLEM — M10.9 GOUT, UNSPECIFIED: Chronic | Status: ACTIVE | Noted: 2020-02-04

## 2020-02-11 ENCOUNTER — NON-APPOINTMENT (OUTPATIENT)
Age: 65
End: 2020-02-11

## 2020-02-11 ENCOUNTER — APPOINTMENT (OUTPATIENT)
Dept: ELECTROPHYSIOLOGY | Facility: CLINIC | Age: 65
End: 2020-02-11
Payer: COMMERCIAL

## 2020-02-11 VITALS
SYSTOLIC BLOOD PRESSURE: 134 MMHG | OXYGEN SATURATION: 94 % | DIASTOLIC BLOOD PRESSURE: 85 MMHG | HEIGHT: 65 IN | BODY MASS INDEX: 32.82 KG/M2 | WEIGHT: 197 LBS | HEART RATE: 62 BPM

## 2020-02-11 PROCEDURE — 99205 OFFICE O/P NEW HI 60 MIN: CPT

## 2020-02-11 PROCEDURE — 93000 ELECTROCARDIOGRAM COMPLETE: CPT

## 2020-02-11 RX ORDER — METRONIDAZOLE 10 MG/G
1 GEL TOPICAL
Qty: 60 | Refills: 0 | Status: DISCONTINUED | COMMUNITY
Start: 2017-04-21 | End: 2020-02-11

## 2020-02-11 RX ORDER — VALSARTAN 80 MG/1
80 TABLET, COATED ORAL
Qty: 90 | Refills: 0 | Status: DISCONTINUED | COMMUNITY
Start: 2017-05-24 | End: 2020-02-11

## 2020-02-11 RX ORDER — BETAMETHASONE DIPROPIONATE 0.5 MG/G
0.05 CREAM TOPICAL
Qty: 45 | Refills: 0 | Status: DISCONTINUED | COMMUNITY
Start: 2018-04-13 | End: 2020-02-11

## 2020-02-11 RX ORDER — FLUTICASONE PROPIONATE 50 UG/1
50 SPRAY, METERED NASAL
Qty: 16 | Refills: 0 | Status: DISCONTINUED | COMMUNITY
Start: 2017-12-18 | End: 2020-02-11

## 2020-02-11 RX ORDER — APIXABAN 5 MG/1
5 TABLET, FILM COATED ORAL
Qty: 60 | Refills: 0 | Status: ACTIVE | COMMUNITY

## 2020-02-11 RX ORDER — CLOTRIMAZOLE AND BETAMETHASONE DIPROPIONATE 10; .5 MG/G; MG/G
1-0.05 CREAM TOPICAL
Qty: 60 | Refills: 0 | Status: DISCONTINUED | COMMUNITY
Start: 2018-04-06 | End: 2020-02-11

## 2020-02-11 RX ORDER — ATORVASTATIN CALCIUM 40 MG/1
40 TABLET, FILM COATED ORAL
Refills: 0 | Status: ACTIVE | COMMUNITY
Start: 2018-03-12

## 2020-02-11 RX ORDER — ECONAZOLE NITRATE 10 MG/G
1 CREAM TOPICAL
Qty: 45 | Refills: 0 | Status: DISCONTINUED | COMMUNITY
Start: 2018-04-13 | End: 2020-02-11

## 2020-02-11 RX ORDER — CLOPIDOGREL BISULFATE 75 MG/1
75 TABLET, FILM COATED ORAL
Qty: 90 | Refills: 0 | Status: DISCONTINUED | COMMUNITY
Start: 2018-04-04 | End: 2020-02-11

## 2020-02-11 RX ORDER — RANITIDINE HCL 15 MG/ML
75 SYRUP ORAL
Refills: 0 | Status: DISCONTINUED | COMMUNITY
End: 2020-02-11

## 2020-02-11 RX ORDER — ALLOPURINOL 300 MG/1
300 TABLET ORAL
Refills: 0 | Status: ACTIVE | COMMUNITY

## 2020-02-12 DIAGNOSIS — E78.5 HYPERLIPIDEMIA, UNSPECIFIED: ICD-10-CM

## 2020-02-12 DIAGNOSIS — I48.91 UNSPECIFIED ATRIAL FIBRILLATION: ICD-10-CM

## 2020-02-12 DIAGNOSIS — Z98.1 ARTHRODESIS STATUS: ICD-10-CM

## 2020-02-12 DIAGNOSIS — Z86.73 PERSONAL HISTORY OF TRANSIENT ISCHEMIC ATTACK (TIA), AND CEREBRAL INFARCTION WITHOUT RESIDUAL DEFICITS: ICD-10-CM

## 2020-02-12 DIAGNOSIS — M10.9 GOUT, UNSPECIFIED: ICD-10-CM

## 2020-02-12 DIAGNOSIS — Z79.01 LONG TERM (CURRENT) USE OF ANTICOAGULANTS: ICD-10-CM

## 2020-02-12 DIAGNOSIS — M54.9 DORSALGIA, UNSPECIFIED: ICD-10-CM

## 2020-02-12 DIAGNOSIS — Z79.82 LONG TERM (CURRENT) USE OF ASPIRIN: ICD-10-CM

## 2020-02-12 DIAGNOSIS — G89.29 OTHER CHRONIC PAIN: ICD-10-CM

## 2020-02-12 DIAGNOSIS — I10 ESSENTIAL (PRIMARY) HYPERTENSION: ICD-10-CM

## 2020-02-12 DIAGNOSIS — Z90.710 ACQUIRED ABSENCE OF BOTH CERVIX AND UTERUS: ICD-10-CM

## 2020-02-12 DIAGNOSIS — M19.90 UNSPECIFIED OSTEOARTHRITIS, UNSPECIFIED SITE: ICD-10-CM

## 2020-02-12 DIAGNOSIS — Z90.49 ACQUIRED ABSENCE OF OTHER SPECIFIED PARTS OF DIGESTIVE TRACT: ICD-10-CM

## 2020-02-12 DIAGNOSIS — Z80.1 FAMILY HISTORY OF MALIGNANT NEOPLASM OF TRACHEA, BRONCHUS AND LUNG: ICD-10-CM

## 2020-02-12 DIAGNOSIS — Z82.49 FAMILY HISTORY OF ISCHEMIC HEART DISEASE AND OTHER DISEASES OF THE CIRCULATORY SYSTEM: ICD-10-CM

## 2020-02-12 DIAGNOSIS — Z87.891 PERSONAL HISTORY OF NICOTINE DEPENDENCE: ICD-10-CM

## 2020-02-12 NOTE — PHYSICAL EXAM
[Heart Rate And Rhythm] : heart rate and rhythm were normal [Heart Sounds] : normal S1 and S2 [Well Groomed] : well groomed [Normal Conjunctiva] : the conjunctiva exhibited no abnormalities [Eyelids - No Xanthelasma] : the eyelids demonstrated no xanthelasmas [General Appearance - In No Acute Distress] : no acute distress [No Oral Pallor] : no oral pallor [Normal Oral Mucosa] : normal oral mucosa [No Oral Cyanosis] : no oral cyanosis [Normal Jugular Venous A Waves Present] : normal jugular venous A waves present [Murmurs] : no murmurs present [Normal Jugular Venous V Waves Present] : normal jugular venous V waves present [No Jugular Venous Beard A Waves] : no jugular venous beard A waves [Auscultation Breath Sounds / Voice Sounds] : lungs were clear to auscultation bilaterally [Exaggerated Use Of Accessory Muscles For Inspiration] : no accessory muscle use [Respiration, Rhythm And Depth] : normal respiratory rhythm and effort [Abdomen Soft] : soft [Abdomen Tenderness] : non-tender [Abnormal Walk] : normal gait [Abdomen Mass (___ Cm)] : no abdominal mass palpated [Nail Clubbing] : no clubbing of the fingernails [Gait - Sufficient For Exercise Testing] : the gait was sufficient for exercise testing [Cyanosis, Localized] : no localized cyanosis [Petechial Hemorrhages (___cm)] : no petechial hemorrhages [Skin Color & Pigmentation] : normal skin color and pigmentation [No Venous Stasis] : no venous stasis [] : no rash [Skin Lesions] : no skin lesions [No Skin Ulcers] : no skin ulcer [No Xanthoma] : no  xanthoma was observed [Affect] : the affect was normal [Oriented To Time, Place, And Person] : oriented to person, place, and time [No Anxiety] : not feeling anxious [Mood] : the mood was normal

## 2020-02-12 NOTE — HISTORY OF PRESENT ILLNESS
[FreeTextEntry1] : 65yo F with pmhx CVA on Plavix (not on ASA) s/p PFO closure and ILR implant, "rapid heart beat" in 2004 which pt believes was provoked by increased caffeine intake, was placed on Coumadin and was then discontinued shortly after, HTN, HLD, Gout, OA, chronic back pain, h/o thyroid surgery due to benign tumor and thyroid nodule, 60 lb weight loss since 5/2019, recently  hospitalization for Afib s/p successful DCCV (200J x1) who presents for ablation evaluation. \par \par Patient reports symptoms began 3 days before hospitalization, when she was out for dinner with spouse, felt her heart racing, mild headaches, and and uncomforrtable to a point where she wanted to "jump out of her body." She subsequently went home, took an extra Lopressor 100mg and Aspirin and symptoms mildly improved. The next day pt went supermarket shopping and began feeling lightheaded, dizzy, and diaphoretic, and was found to be in Atrial fibrillation on EKG at Dr. Quesada's office the next day. From there she was sent to . After DCCV, eliquis initiated, ARB discontinued and Metorpolol dosage decreased to 50mg BID (from 100mg BID) secondary to hypotension. \par \par Currently reports feeling well after DCCV, however expressed fear of Afib recurring and interested in undergoing ablation procedure. She denies chest pain, pressure, dizziness, lightheadedness, shortness of breath. Also denies recent alcohol/caffeine use. She reports few nosebleeds since starting eliquis but denies hematuria, dark stools, or any other signs of bleeding.  \par

## 2020-02-12 NOTE — REVIEW OF SYSTEMS
[Anxiety] : anxiety [Recent Weight Loss (___ Lbs)] : recent [unfilled] ~Ulb weight loss [Negative] : Heme/Lymph

## 2020-02-12 NOTE — DISCUSSION/SUMMARY
[FreeTextEntry1] : 65 yo F with history of hypertension, CVA attributed to PFO s/p closure s/p recent hospitalization for persistent atrial fibrillation.   She is status post cardioversion and maintaining sinus rhythm on beta blocker therapy.  We discussed options for therapy.  While one may consider ablation she is status post PFO closure with this being the likely explanation for her cerebral vascular event, ie ablation does require a transeptal puncture.  While this would be technically more difficult secondary to the septal occluder, I did reassure her that this is different than her original PFO and most often closes within 6 months.  Furthermore she is now on AC and at the time of her event she was not even on aspirin.  I counseled her that ablation may be aggressive in the setting of this single episode.  We have decided to reserve ablation for recurrence and start Ryhtmol at this time.  She will call in one week and if tolerating this we will do an exercise treadmill test. \par \par Follow up in the office in 3 months.

## 2020-03-02 ENCOUNTER — EMERGENCY (EMERGENCY)
Facility: HOSPITAL | Age: 65
LOS: 1 days | Discharge: HOME | End: 2020-03-02
Attending: STUDENT IN AN ORGANIZED HEALTH CARE EDUCATION/TRAINING PROGRAM
Payer: COMMERCIAL

## 2020-03-02 VITALS
HEART RATE: 68 BPM | DIASTOLIC BLOOD PRESSURE: 78 MMHG | RESPIRATION RATE: 18 BRPM | OXYGEN SATURATION: 98 % | TEMPERATURE: 98 F | SYSTOLIC BLOOD PRESSURE: 118 MMHG

## 2020-03-02 VITALS
OXYGEN SATURATION: 100 % | WEIGHT: 195.11 LBS | DIASTOLIC BLOOD PRESSURE: 95 MMHG | RESPIRATION RATE: 16 BRPM | HEART RATE: 67 BPM | SYSTOLIC BLOOD PRESSURE: 160 MMHG | HEIGHT: 65 IN | TEMPERATURE: 98 F

## 2020-03-02 DIAGNOSIS — Z98.890 OTHER SPECIFIED POSTPROCEDURAL STATES: Chronic | ICD-10-CM

## 2020-03-02 DIAGNOSIS — Z90.710 ACQUIRED ABSENCE OF BOTH CERVIX AND UTERUS: Chronic | ICD-10-CM

## 2020-03-02 DIAGNOSIS — Z98.1 ARTHRODESIS STATUS: Chronic | ICD-10-CM

## 2020-03-02 DIAGNOSIS — Z90.49 ACQUIRED ABSENCE OF OTHER SPECIFIED PARTS OF DIGESTIVE TRACT: Chronic | ICD-10-CM

## 2020-03-02 LAB
ALBUMIN SERPL ELPH-MCNC: 4.4 G/DL — SIGNIFICANT CHANGE UP (ref 3.3–5)
ALP SERPL-CCNC: 67 U/L — SIGNIFICANT CHANGE UP (ref 40–120)
ALT FLD-CCNC: 37 U/L — SIGNIFICANT CHANGE UP (ref 10–45)
ANION GAP SERPL CALC-SCNC: 12 MMOL/L — SIGNIFICANT CHANGE UP (ref 5–17)
APTT BLD: 36.5 SEC — HIGH (ref 27.5–36.3)
AST SERPL-CCNC: 63 U/L — HIGH (ref 10–40)
BASOPHILS # BLD AUTO: 0.05 K/UL — SIGNIFICANT CHANGE UP (ref 0–0.2)
BASOPHILS NFR BLD AUTO: 0.6 % — SIGNIFICANT CHANGE UP (ref 0–2)
BILIRUB SERPL-MCNC: 0.3 MG/DL — SIGNIFICANT CHANGE UP (ref 0.2–1.2)
BUN SERPL-MCNC: 19 MG/DL — SIGNIFICANT CHANGE UP (ref 7–23)
CALCIUM SERPL-MCNC: 10.5 MG/DL — SIGNIFICANT CHANGE UP (ref 8.4–10.5)
CHLORIDE SERPL-SCNC: 103 MMOL/L — SIGNIFICANT CHANGE UP (ref 96–108)
CO2 SERPL-SCNC: 22 MMOL/L — SIGNIFICANT CHANGE UP (ref 22–31)
CREAT SERPL-MCNC: 0.68 MG/DL — SIGNIFICANT CHANGE UP (ref 0.5–1.3)
EOSINOPHIL # BLD AUTO: 0.14 K/UL — SIGNIFICANT CHANGE UP (ref 0–0.5)
EOSINOPHIL NFR BLD AUTO: 1.6 % — SIGNIFICANT CHANGE UP (ref 0–6)
GLUCOSE SERPL-MCNC: 174 MG/DL — HIGH (ref 70–99)
HCT VFR BLD CALC: 41.6 % — SIGNIFICANT CHANGE UP (ref 34.5–45)
HGB BLD-MCNC: 13.8 G/DL — SIGNIFICANT CHANGE UP (ref 11.5–15.5)
IMM GRANULOCYTES NFR BLD AUTO: 0.2 % — SIGNIFICANT CHANGE UP (ref 0–1.5)
INR BLD: 1.23 RATIO — HIGH (ref 0.88–1.16)
LYMPHOCYTES # BLD AUTO: 2.56 K/UL — SIGNIFICANT CHANGE UP (ref 1–3.3)
LYMPHOCYTES # BLD AUTO: 28.5 % — SIGNIFICANT CHANGE UP (ref 13–44)
MCHC RBC-ENTMCNC: 31.7 PG — SIGNIFICANT CHANGE UP (ref 27–34)
MCHC RBC-ENTMCNC: 33.2 GM/DL — SIGNIFICANT CHANGE UP (ref 32–36)
MCV RBC AUTO: 95.4 FL — SIGNIFICANT CHANGE UP (ref 80–100)
MONOCYTES # BLD AUTO: 0.54 K/UL — SIGNIFICANT CHANGE UP (ref 0–0.9)
MONOCYTES NFR BLD AUTO: 6 % — SIGNIFICANT CHANGE UP (ref 2–14)
NEUTROPHILS # BLD AUTO: 5.68 K/UL — SIGNIFICANT CHANGE UP (ref 1.8–7.4)
NEUTROPHILS NFR BLD AUTO: 63.1 % — SIGNIFICANT CHANGE UP (ref 43–77)
NRBC # BLD: 0 /100 WBCS — SIGNIFICANT CHANGE UP (ref 0–0)
PLATELET # BLD AUTO: 295 K/UL — SIGNIFICANT CHANGE UP (ref 150–400)
POTASSIUM SERPL-MCNC: 5.4 MMOL/L — HIGH (ref 3.5–5.3)
POTASSIUM SERPL-SCNC: 5.4 MMOL/L — HIGH (ref 3.5–5.3)
PROT SERPL-MCNC: 7.1 G/DL — SIGNIFICANT CHANGE UP (ref 6–8.3)
PROTHROM AB SERPL-ACNC: 14.2 SEC — HIGH (ref 10–12.9)
RBC # BLD: 4.36 M/UL — SIGNIFICANT CHANGE UP (ref 3.8–5.2)
RBC # FLD: 13.2 % — SIGNIFICANT CHANGE UP (ref 10.3–14.5)
SODIUM SERPL-SCNC: 137 MMOL/L — SIGNIFICANT CHANGE UP (ref 135–145)
TROPONIN T, HIGH SENSITIVITY RESULT: <6 NG/L — SIGNIFICANT CHANGE UP (ref 0–51)
WBC # BLD: 8.99 K/UL — SIGNIFICANT CHANGE UP (ref 3.8–10.5)
WBC # FLD AUTO: 8.99 K/UL — SIGNIFICANT CHANGE UP (ref 3.8–10.5)

## 2020-03-02 PROCEDURE — 71046 X-RAY EXAM CHEST 2 VIEWS: CPT

## 2020-03-02 PROCEDURE — 93005 ELECTROCARDIOGRAM TRACING: CPT

## 2020-03-02 PROCEDURE — 84484 ASSAY OF TROPONIN QUANT: CPT

## 2020-03-02 PROCEDURE — 71046 X-RAY EXAM CHEST 2 VIEWS: CPT | Mod: 26

## 2020-03-02 PROCEDURE — 36415 COLL VENOUS BLD VENIPUNCTURE: CPT

## 2020-03-02 PROCEDURE — 93010 ELECTROCARDIOGRAM REPORT: CPT | Mod: NC

## 2020-03-02 PROCEDURE — 85730 THROMBOPLASTIN TIME PARTIAL: CPT

## 2020-03-02 PROCEDURE — 85610 PROTHROMBIN TIME: CPT

## 2020-03-02 PROCEDURE — 85027 COMPLETE CBC AUTOMATED: CPT

## 2020-03-02 PROCEDURE — 80053 COMPREHEN METABOLIC PANEL: CPT

## 2020-03-02 PROCEDURE — 99284 EMERGENCY DEPT VISIT MOD MDM: CPT | Mod: 25

## 2020-03-02 PROCEDURE — 99285 EMERGENCY DEPT VISIT HI MDM: CPT

## 2020-03-02 NOTE — ED PROVIDER NOTE - PMH
Chronic back pain    CVA, old, dysarthria    Gout    Hypertension    OA (osteoarthritis)    PFO (patent foramen ovale)    Thyroid nodule

## 2020-03-02 NOTE — ED PROVIDER NOTE - ATTENDING CONTRIBUTION TO CARE
I performed a history and physical exam of the patient and discussed their management with the PA/NP.  I reviewed the ACP's note and agree with the documented findings and plan of care except as noted below. My medical decision making and observations are as follows:    65 yo F with PMHx of CVA, HTN, CAD, AF s/p cardioversion (3 weeks ago) p/w constipation.  Patient reports that she has been feeling more constipated over the past few weeks after having medications adjusted after her cardioversion.  Over the past 4 days she has been unable to have a BM.  Went to her gastroenterologist who attempted manual disimpaction unsuccessfully.  Patient also endorsing urinary retention since this morning.  Pt with mild diffuse abd ttp, heart rrr, lungs cta.  Will ultrasound bladder to eval for retention, give enema, and follow up labs, reassess.

## 2020-03-02 NOTE — ED PROVIDER NOTE - PATIENT PORTAL LINK FT
You can access the FollowMyHealth Patient Portal offered by [facility] by registering at the following website: [email or web address]. By joining [employer]’s FollowMyHealth portal, you will also be able to view your health information using other applications (apps) compatible with our system.

## 2020-03-02 NOTE — ED PROVIDER NOTE - PHYSICAL EXAMINATION
Gen: AAO x 3, NAD  Skin: No rashes or lesions  HEENT: NC/AT, PERRLA, EOMI, MMM  Resp: unlabored CTAB  Cardiac: rrr s1s2, no murmurs, rubs or gallops  GI: ND, +BS, Soft, No focal tenderness, mild diffuse ttp  Ext: no pedal edema, FROM in all extremities  Neuro: no focal deficits

## 2020-03-02 NOTE — ED PROVIDER NOTE - RAPID ASSESSMENT
64y F with PMHx of OA, Gout, CVA, HTN, CAD, AF S/P cardioversion by Dr. Moreno at [facility], Loop recorder presents to ED c/o constipation with no BM since 4d ago. Tried taking Benefiber and Colace x3 with no relief. Saw GI, Dr. Solis, this morning who tried to do a manual excavation of stools which was unsuccessful. Unable to urinate since 6:30am this morning, but was able to urinate a minimal amount after manual excavation of stools.     **Pt seen in waiting room by Paul NOBLE), documentation completed by Amber Moseley. Pt to be sent to main ED for further evaluation - all orders placed to be followed by MD in the main ED**

## 2020-03-02 NOTE — ED PROVIDER NOTE - PROGRESS NOTE DETAILS
Bladder scan with 700cc.  patient requesting to hold off on straight cath and attempt to urinate.  -Scott Fontaine PA-C Patient seen at bedside in NAD.  VSS.  Patient resting comfortably without complaints. Patient with a large BM s/p enema.  Reports that she is feeling much better.  Patient now urinating without difficulty.  Will DC home with bowel regimen and GI follow up.  Strict return precautions provided.  -Scott Fontaine PA-C Patient seen at bedside in NAD.  VSS.  Patient resting comfortably without complaints. Patient with a large BM s/p enema and was able to urinate large volume as well  Reports that she is feeling much better.  Patient now urinating without difficulty.  Abd is soft, ntnd.  Will DC home with bowel regimen and GI follow up.  Strict return precautions provided.  -Scott Fontaine PA-C

## 2020-03-02 NOTE — ED PROVIDER NOTE - NS_ ATTENDINGSCRIBEDETAILS _ED_A_ED_FT
The scribe's documentation has been prepared under my direction and personally reviewed by me in its entirety. I confirm that the note above accurately reflects all work, treatment, procedures, and medical decision making performed by me.  WINDY Dutta MD     I have performed a rapid screening assessment of this patient in the waiting room.  Additional evaluation and/or testing to be performed in the main Emergency Department by another attending. WINDY Dutta MD

## 2020-03-02 NOTE — ED PROVIDER NOTE - NSFOLLOWUPINSTRUCTIONS_ED_ALL_ED_FT
1.  Stay well hydrated  2.  Continue current home medications  3.  Take Miralax 2 x per day as instructed by your gastroenterologist.  You can take senna as needed for persistent constipation.  if stools become looser, limit miralax  to once per day.   4.  Follow up with your PMD and gastroenterologist upon discharge  5.  Return to the ER for worsening constipation, abdominal pain, fevers/chills or any other concerning symptoms

## 2020-03-02 NOTE — ED PROVIDER NOTE - OBJECTIVE STATEMENT
65 yo F with PMHx of CVA, HTN, CAD, AF s/p cardioversion (3 weeks ago) p/w constipation.  Patient reports that she has been feeling more constipated over the past few weeks after having medications adjusted after her cardioversion.  Over the past 4 days she has been unable to have a BM.  Went to her gastroenterologist who attempted manual disimpaction unsuccessfully.  Patient also endorsing urinary retention since this morning.  Now feeling distended with abdominal pressure.  Denies fevers/chills, CP, SOB, diarrhea, nausea/vomiting.  Normal appetite at home.  Tried taking colace and benefiber without improvement.

## 2020-03-02 NOTE — ED ADULT NURSE NOTE - OBJECTIVE STATEMENT
63 y/o female c/o constipation x2 weeks. Pt started taking new antiarrythmic med 2 weeks ago which she thinks caused the constipation. Has tried taking stool softeners and fiber with no relief. Went to GI today who attempted disimpaction but only got a minimal amount out and recommended pt go to ER. Pt also has had difficulty passing urine since yesterday AM (no pain or blood in urine) d/t pressure in abd from constipation. PMH of afib on eliquis and prior cardioversion, HTN, CVA, CAD, gout. Denies cramps, change in PO intake, fevers, chills, cough, N/V, bleeding, confusion, trauma, falls, visual changes, numbness or tingling. A&Ox4, breath sounds clear bilaterally, abd is slightly distended (pt reports this is distended for her) and slightly firm, tenderness diffusely in abd, no edema, skin warm dry and intact, ambulatory and able to move all extremities. IV placed and labs drawn, will continue to reassess.

## 2020-03-02 NOTE — ED ADULT NURSE REASSESSMENT NOTE - NS ED NURSE REASSESS COMMENT FT1
Patient seen by Epifanio Dutta - Left forearm 20G IVL inserted and blood work sent to lab as ordered.

## 2020-03-04 ENCOUNTER — RX CHANGE (OUTPATIENT)
Age: 65
End: 2020-03-04

## 2020-03-05 ENCOUNTER — RX CHANGE (OUTPATIENT)
Age: 65
End: 2020-03-05

## 2020-03-09 ENCOUNTER — APPOINTMENT (OUTPATIENT)
Dept: ELECTROPHYSIOLOGY | Facility: CLINIC | Age: 65
End: 2020-03-09
Payer: COMMERCIAL

## 2020-03-09 ENCOUNTER — NON-APPOINTMENT (OUTPATIENT)
Age: 65
End: 2020-03-09

## 2020-03-09 VITALS
SYSTOLIC BLOOD PRESSURE: 121 MMHG | HEIGHT: 65 IN | DIASTOLIC BLOOD PRESSURE: 79 MMHG | HEART RATE: 61 BPM | OXYGEN SATURATION: 96 %

## 2020-03-09 PROCEDURE — 93000 ELECTROCARDIOGRAM COMPLETE: CPT

## 2020-03-09 PROCEDURE — 99215 OFFICE O/P EST HI 40 MIN: CPT

## 2020-03-09 RX ORDER — ASPIRIN ENTERIC COATED TABLETS 81 MG 81 MG/1
81 TABLET, DELAYED RELEASE ORAL
Refills: 0 | Status: DISCONTINUED | COMMUNITY
End: 2020-03-09

## 2020-03-09 NOTE — HISTORY OF PRESENT ILLNESS
[FreeTextEntry1] : Debilitating constipation, even effecting her ability to urinate. In fact post enema she defecated and was able to urinate as well.  She is currently taking Mirilax.   She is conflicted because she does not want go back into AF and she certainly doesn't want to take Mirilax everyday.  She now has loose stools a few times/day. She is afraid to do anything.  No chest pain. No shortness of breath.

## 2020-03-09 NOTE — PHYSICAL EXAM
[Well Groomed] : well groomed [General Appearance - In No Acute Distress] : no acute distress [Normal Conjunctiva] : the conjunctiva exhibited no abnormalities [Eyelids - No Xanthelasma] : the eyelids demonstrated no xanthelasmas [Normal Oral Mucosa] : normal oral mucosa [No Oral Pallor] : no oral pallor [No Oral Cyanosis] : no oral cyanosis [Normal Jugular Venous A Waves Present] : normal jugular venous A waves present [Normal Jugular Venous V Waves Present] : normal jugular venous V waves present [No Jugular Venous Beard A Waves] : no jugular venous beard A waves [Respiration, Rhythm And Depth] : normal respiratory rhythm and effort [Exaggerated Use Of Accessory Muscles For Inspiration] : no accessory muscle use [Auscultation Breath Sounds / Voice Sounds] : lungs were clear to auscultation bilaterally [Heart Rate And Rhythm] : heart rate and rhythm were normal [Heart Sounds] : normal S1 and S2 [Murmurs] : no murmurs present [Abdomen Soft] : soft [Abdomen Tenderness] : non-tender [Abdomen Mass (___ Cm)] : no abdominal mass palpated [Abnormal Walk] : normal gait [Gait - Sufficient For Exercise Testing] : the gait was sufficient for exercise testing [Nail Clubbing] : no clubbing of the fingernails [Cyanosis, Localized] : no localized cyanosis [Petechial Hemorrhages (___cm)] : no petechial hemorrhages [Skin Color & Pigmentation] : normal skin color and pigmentation [] : no rash [No Venous Stasis] : no venous stasis [Skin Lesions] : no skin lesions [No Skin Ulcers] : no skin ulcer [No Xanthoma] : no  xanthoma was observed [Oriented To Time, Place, And Person] : oriented to person, place, and time [Affect] : the affect was normal [Mood] : the mood was normal [No Anxiety] : not feeling anxious

## 2020-03-09 NOTE — REVIEW OF SYSTEMS
[Recent Weight Loss (___ Lbs)] : recent [unfilled] ~Ulb weight loss [Anxiety] : anxiety [Negative] : Heme/Lymph

## 2020-03-27 ENCOUNTER — RX CHANGE (OUTPATIENT)
Age: 65
End: 2020-03-27

## 2020-05-14 ENCOUNTER — APPOINTMENT (OUTPATIENT)
Dept: ELECTROPHYSIOLOGY | Facility: CLINIC | Age: 65
End: 2020-05-14
Payer: MEDICARE

## 2020-05-14 PROCEDURE — 99214 OFFICE O/P EST MOD 30 MIN: CPT | Mod: 95

## 2020-05-14 NOTE — HISTORY OF PRESENT ILLNESS
[FreeTextEntry1] : 30 minute telehealth visit in the setting of COVID 19:\par \par Feels well.  She does not have any constipation on Flecianide, but continues on colace and admits that she takes MiraLAX 2x/week to "be sure".  Rare palpitations that she describes as a "skipped beat", but no sustained arrhythmia.  No identifiable trigger.  No associated LH/dizziness. No chest pain. NO shortness of breath. She continues to do her exercise (walking) with no change in effort tolerance.

## 2020-05-14 NOTE — DISCUSSION/SUMMARY
[FreeTextEntry1] : Episode of persistent AF post DCCV.  Doing well on Flecainide.  We again discussed ablation, but would like to be more conservative in the setting of PFO closure and TIA.  Follow up in 6 months.

## 2020-05-14 NOTE — REVIEW OF SYSTEMS
[Anxiety] : anxiety [Negative] : Heme/Lymph [Recent Weight Loss (___ Lbs)] : recent [unfilled] ~Ulb weight loss

## 2020-07-24 NOTE — ED ADULT TRIAGE NOTE - ESI TRIAGE ACUITY LEVEL, MLM
[de-identified] : Ms. Hsu states that her RIGHT ankle is feeling much better\par She wears the boot part-time and walks with crutches partial weightbearing.\par Pain is variable, 0-1/10.\par She is taking vitamin D.\par She's been moving her ankle.\par Now she also is having snapping in her RIGHT hip. She noticed this in the last few weeks. She never had it before. There is mild discomfort.
3

## 2020-09-08 ENCOUNTER — RX RENEWAL (OUTPATIENT)
Age: 65
End: 2020-09-08

## 2020-09-14 ENCOUNTER — OUTPATIENT (OUTPATIENT)
Dept: OUTPATIENT SERVICES | Facility: HOSPITAL | Age: 65
LOS: 1 days | End: 2020-09-14
Payer: COMMERCIAL

## 2020-09-14 ENCOUNTER — APPOINTMENT (OUTPATIENT)
Dept: MAMMOGRAPHY | Facility: CLINIC | Age: 65
End: 2020-09-14
Payer: COMMERCIAL

## 2020-09-14 DIAGNOSIS — Z98.890 OTHER SPECIFIED POSTPROCEDURAL STATES: Chronic | ICD-10-CM

## 2020-09-14 DIAGNOSIS — Z98.1 ARTHRODESIS STATUS: Chronic | ICD-10-CM

## 2020-09-14 DIAGNOSIS — Z90.710 ACQUIRED ABSENCE OF BOTH CERVIX AND UTERUS: Chronic | ICD-10-CM

## 2020-09-14 DIAGNOSIS — Z12.31 ENCOUNTER FOR SCREENING MAMMOGRAM FOR MALIGNANT NEOPLASM OF BREAST: ICD-10-CM

## 2020-09-14 DIAGNOSIS — Z90.49 ACQUIRED ABSENCE OF OTHER SPECIFIED PARTS OF DIGESTIVE TRACT: Chronic | ICD-10-CM

## 2020-09-14 PROCEDURE — 77063 BREAST TOMOSYNTHESIS BI: CPT | Mod: 26

## 2020-09-14 PROCEDURE — 77067 SCR MAMMO BI INCL CAD: CPT | Mod: 26

## 2020-09-14 PROCEDURE — 77063 BREAST TOMOSYNTHESIS BI: CPT

## 2020-09-14 PROCEDURE — 77067 SCR MAMMO BI INCL CAD: CPT

## 2020-10-13 ENCOUNTER — APPOINTMENT (OUTPATIENT)
Dept: ELECTROPHYSIOLOGY | Facility: CLINIC | Age: 65
End: 2020-10-13
Payer: COMMERCIAL

## 2020-10-13 ENCOUNTER — NON-APPOINTMENT (OUTPATIENT)
Age: 65
End: 2020-10-13

## 2020-10-13 VITALS
HEIGHT: 65 IN | OXYGEN SATURATION: 100 % | DIASTOLIC BLOOD PRESSURE: 83 MMHG | WEIGHT: 170 LBS | HEART RATE: 59 BPM | BODY MASS INDEX: 28.32 KG/M2 | SYSTOLIC BLOOD PRESSURE: 127 MMHG

## 2020-10-13 PROCEDURE — 99215 OFFICE O/P EST HI 40 MIN: CPT

## 2020-10-13 PROCEDURE — 93000 ELECTROCARDIOGRAM COMPLETE: CPT | Mod: 59

## 2020-10-13 PROCEDURE — 93291 INTERROG DEV EVAL SCRMS IP: CPT

## 2020-10-13 RX ORDER — DOCUSATE SODIUM 100 MG/1
100 CAPSULE ORAL TWICE DAILY
Refills: 0 | Status: ACTIVE | COMMUNITY
Start: 2020-10-13

## 2020-10-13 RX ORDER — FLECAINIDE ACETATE 50 MG/1
50 TABLET ORAL TWICE DAILY
Qty: 180 | Refills: 1 | Status: DISCONTINUED | COMMUNITY
Start: 2020-09-08 | End: 2020-10-13

## 2020-10-13 RX ORDER — PROPAFENONE 225 MG/1
225 CAPSULE, EXTENDED RELEASE ORAL
Qty: 180 | Refills: 1 | Status: DISCONTINUED | COMMUNITY
Start: 2020-02-11 | End: 2020-10-13

## 2020-10-13 NOTE — PHYSICAL EXAM
[General Appearance - Well Developed] : well developed [Normal Appearance] : normal appearance [Well Groomed] : well groomed [General Appearance - Well Nourished] : well nourished [No Deformities] : no deformities [General Appearance - In No Acute Distress] : no acute distress [Normal Conjunctiva] : the conjunctiva exhibited no abnormalities [Eyelids - No Xanthelasma] : the eyelids demonstrated no xanthelasmas [Normal Oral Mucosa] : normal oral mucosa [No Oral Pallor] : no oral pallor [No Oral Cyanosis] : no oral cyanosis [Normal Jugular Venous A Waves Present] : normal jugular venous A waves present [Normal Jugular Venous V Waves Present] : normal jugular venous V waves present [No Jugular Venous Beard A Waves] : no jugular venous beard A waves [Respiration, Rhythm And Depth] : normal respiratory rhythm and effort [Exaggerated Use Of Accessory Muscles For Inspiration] : no accessory muscle use [Auscultation Breath Sounds / Voice Sounds] : lungs were clear to auscultation bilaterally [Heart Rate And Rhythm] : heart rate and rhythm were normal [Heart Sounds] : normal S1 and S2 [Murmurs] : no murmurs present [Abdomen Soft] : soft [Abdomen Tenderness] : non-tender [Abdomen Mass (___ Cm)] : no abdominal mass palpated [Abnormal Walk] : normal gait [Gait - Sufficient For Exercise Testing] : the gait was sufficient for exercise testing [Nail Clubbing] : no clubbing of the fingernails [Cyanosis, Localized] : no localized cyanosis [Petechial Hemorrhages (___cm)] : no petechial hemorrhages [Skin Color & Pigmentation] : normal skin color and pigmentation [] : no rash [No Venous Stasis] : no venous stasis [Skin Lesions] : no skin lesions [No Skin Ulcers] : no skin ulcer [No Xanthoma] : no  xanthoma was observed [Oriented To Time, Place, And Person] : oriented to person, place, and time [Affect] : the affect was normal [Mood] : the mood was normal [No Anxiety] : not feeling anxious

## 2020-10-13 NOTE — HISTORY OF PRESENT ILLNESS
[FreeTextEntry1] : Occasional skipped beats, but no sustained AF.  Her chief complaint is constipation attributed to the flecainide.  She has tried stool softeners without help.  No chest pain. NO shortness of breath.  NO lightheadedness/dizziness.

## 2020-10-13 NOTE — DISCUSSION/SUMMARY
[FreeTextEntry1] : Episode of persistent AF post DCCV.  Doing well on Flecainide but having constipation from this medication.  We again discussed ablation as well as alternative medications (eg Multaq or a hospitalization for initiation sotalol). but would like to be more conservative in the setting of PFO closure and TIA. I did caution her that Multaq tends to be les likely to be effective than flecainide (although she is only on 50 mg of flecainide).  We will change to Multaq and plan for ablation if this fails. Follow up in 6 months.

## 2020-10-30 ENCOUNTER — APPOINTMENT (OUTPATIENT)
Dept: ORTHOPEDIC SURGERY | Facility: CLINIC | Age: 65
End: 2020-10-30
Payer: COMMERCIAL

## 2020-10-30 VITALS
DIASTOLIC BLOOD PRESSURE: 76 MMHG | SYSTOLIC BLOOD PRESSURE: 123 MMHG | BODY MASS INDEX: 28.32 KG/M2 | HEIGHT: 65 IN | HEART RATE: 61 BPM | WEIGHT: 170 LBS

## 2020-10-30 DIAGNOSIS — Z96.653 PRESENCE OF ARTIFICIAL KNEE JOINT, BILATERAL: ICD-10-CM

## 2020-10-30 DIAGNOSIS — M70.72 OTHER BURSITIS OF HIP, LEFT HIP: ICD-10-CM

## 2020-10-30 DIAGNOSIS — Z47.1 AFTERCARE FOLLOWING JOINT REPLACEMENT SURGERY: ICD-10-CM

## 2020-10-30 DIAGNOSIS — Z96.653 AFTERCARE FOLLOWING JOINT REPLACEMENT SURGERY: ICD-10-CM

## 2020-10-30 PROCEDURE — 72170 X-RAY EXAM OF PELVIS: CPT

## 2020-10-30 PROCEDURE — 99214 OFFICE O/P EST MOD 30 MIN: CPT

## 2020-10-30 PROCEDURE — 73562 X-RAY EXAM OF KNEE 3: CPT | Mod: LT

## 2020-10-30 NOTE — DISCUSSION/SUMMARY
[de-identified] : 65 year old  female with mild osteoarthritis of the bilateral hips and greater trochanteric bursitis of the left hip. She is not yet a candidate for left CHEY. Her lateral hip pain may be from her lumbar spine. She is also s/p bilateral TKA DOS 6/8/2017. We reassured the pt that her hardware is in good positioning with no evidence of wear, loosening, or subsidence. The patient would like to continue with prophylaxis for invasive dental procedures. We prescribed amoxicillin today for her upcoming dental work. F/U PRN. \par \par \par The patient is a 65 year individual with end stage arthritis of their b/l knee joint. Based upon the patient's continued symptoms and failure to respond to conservative treatment, pt successfully completed a staged b/l total knee arthroplasty. A long discussion took place with the patient describing what a total joint replacement is and what the procedure would entail. A total knee arthroplasty model, similar to the implant that was used during the operation, was utilized to demonstrate and to discuss the various bearing surfaces of the implants. The hospitalization and post-operative care and rehabilitation were also discussed. The use of perioperative antibiotics and DVT prophylaxis were discussed. The risk, benefits and alternatives to a surgical intervention were discussed at length with the patient. The patient was also advised of risks related to the medical comorbidities, elevated body mass index (BMI), and smoking where applicable. We discussed how to reduce modifiable risk factors and encouraged smoking cessation were applicable.. A lengthy discussion took place to review the most common complications including but not limited to: deep vein thrombosis, pulmonary embolus, heart attack, stroke, infection, wound breakdown, numbness, damage to nerves, tendon, muscles, arteries or other blood vessels, death and other possible complications from anesthesia. The patient was told that we will take steps to minimize these risks by using sterile technique, antibiotics and DVT prophylaxis when appropriate and follow the patient postoperatively in the office setting to monitor progress. The possibility of recurrent pain, no improvement in pain and actual worsening of pain were also discussed with the patient.\par The discharge plan of care focused on the patient going home following surgery. The patient was encouraged to make the necessary arrangements to have someone stay with them when they are discharged home. Following discharge, a home care nurse was to the patient. The home care nurse would open the patient’s home care case and request home physical therapy services. Home physical therapy was to commence following discharge provided it was appropriate and covered by the health insurance benefit plan. \par The benefits of surgery were discussed with the patient including the potential for improving her current clinical condition through operative intervention. Alternatives to surgical intervention including continued conservative management were also discussed in detail. All questions were answered to the satisfaction of the patient. The treatment plan of care, as well as a model of a total knee arthroplasty equivalent to the one that will be used for their total joint replacement, was shared with the patient. The patient agreed to the plan of care as well as the use of implants in their total joint replacement.

## 2020-10-30 NOTE — END OF VISIT
[FreeTextEntry3] : I, Ace Fuentes, acted solely as a scribe for Dr. Agus Harrell on this date 10/30/2020.

## 2020-10-30 NOTE — REASON FOR VISIT
[Follow-Up Visit] : a follow-up visit for [Other: ____] : [unfilled] [FreeTextEntry2] : S/P Bilateral TKR, DOS: 6/8/17.  \par  \par

## 2020-10-30 NOTE — HISTORY OF PRESENT ILLNESS
[Stable] : stable [4] : a current pain level of 4/10 [Intermit.] : ~He/She~ states the symptoms seem to be intermittent [Bending] : worsened by bending [Walking] : worsened by walking [Recumbency] : relieved by recumbency [Rest] : relieved by rest [de-identified] : 65 year old female here for evaluation of left hip, buttock, and low-back pain. She reports pain extends from left sided low-back down her entire left lower extremity. Pt notes some numbness in her left leg. She underwent MRI of the lumbar spine recently, showing central stenosis, L3-L4 bone edema, already saw spine surgeon. Pt is also s/p bilateral TKA DOS 6/8/2017. She states the TKA is best thing she ever did. Pt was recently diagnosed with O.P in left hip. The patient recently lost 90 pounds. In February, she had an afib attack. She is currently on a blood thinner and an antiarrhythmic medication.

## 2020-10-30 NOTE — PHYSICAL EXAM
[LE] : Sensory: Intact in bilateral lower extremities [ALL] : dorsalis pedis, posterior tibial, femoral, popliteal, and radial 2+ and symmetric bilaterally [Normal] : Alert and in no acute distress [Antalgic] : not antalgic [Poor Appearance] : well-appearing [Acute Distress] : not in acute distress [Obese] : not obese [de-identified] : GENERAL APPEARANCE: Well nourished and hydrated, pleasant, alert, and oriented x 3. Appears their stated age. \par HEENT: Normocephalic, extraocular eye motion intact. Nasal septum midline. Oral cavity clear. External auditory canal clear. \par RESPIRATORY: Breath sounds clear and audible in all lobes. No wheezing, No accessory muscle use.\par CARDIOVASCULAR: No apparent abnormalities. No lower leg edema. No varicosities. Pedal pulses are palpable.\par NEUROLOGIC: Sensation is normal, no muscle weakness in the upper or lower extremities.\par DERMATOLOGIC: No apparent skin lesions, moist, warm, no rash.\par SPINE: Cervical spine appears normal and moves freely; thoracic spine appears normal and moves freely; lumbosacral spine appears normal and moves freely, normal, nontender.\par MUSCULOSKELETAL: Hands, wrists, and elbows are normal and move freely, shoulders are normal and move freely.  [de-identified] : Bilateral knee exam shows well healed midline incision, no obvious signs of infection, ROM 0 - 120 degrees without pain.\par Left hip exam shows FROM, tenderness over the greater trochanteric bursa.  \par \par B/L hip exam showed ROM without pain. no groin pain with SLR  [de-identified] : 3V Xray of the bilateral knees done in office today and reviewed by Dr. Agus Harrell demonstrates s/p bilateral TKA with implants in good positioning, no sign of wear, loosening or subsidence. \par \par 1V Xray of the pelvis done in office today and reviewed by Dr. Agus Harrell demonstrates mild bilateral hip osteoarthritis.\par \par MRI of the lumbar spine obtained 8/16/2019 at [facility] shows s/p posterior fusion of L4-5 with intraictal spacer at this level. Grade 1 anterolisthesis of L4 on L5; mild rotary scoliosis. Tiny right parasagittal disc protrusion at L1-L2 unchanged. Small left parasagittal disc protrusion at L2-3 with probable associated osteophytic ridging, prominent left neural foraminal narrowing at this level. Small central and left foraminal disc protrusion at L3-4 with moderate central canal stenosis and prominent left neural foraminal canal narrowing. Mild central canal stenosis at L4-5 primarily due to mild increase in posterior epidural fat.

## 2021-01-28 NOTE — ED PROVIDER NOTE - GASTROINTESTINAL [-], MLM
Maia Hidalgo 72             Speech Therapy              DAILY TREATMENT NOTE    Date: 1/28/2021  Patients Name:  Corrine Duarte  YOB: 2009 (14 y.o.)  Gender:  female  MRN:  [id number]  [facility] #: [id number]  Referring physician:Sia Macias       Precautions: N/A    PAIN  [x]No     []Yes      Pain Rating (0-10 pain scale): 0  Location:  N/A  Pain Description:N/A    SHORT TERM GOALS/ TREATMENT SESSION:  Subjective report:          Patient seen virtually while patient was at home and SLP was at [facility] speech clinic. Goal 1: When given visual, verbal and/or tactile cues, patient will appropriately formulate grammatically correct sentences (in written form and/or verbally) using target word(s) with 80% accuracy over 3 consecutive sessions. 35% acc min cues     []Met  []Partially met  [x]Not met   Goal 2: When given visual, verbal and/or tactile cues, patient will demonstrate use and understanding of a variety of vocabulary activities (e.g., formulating definitions, categories, concepts, compare/contrast, synonyms/antonyms, multiple meaning words, etc) with 80% accuracy over 3 consecutive sessions. 65% acc min cues definitions  50% antonym/synonyms   []Met  []Partially met  [x]Not met   Goal 3: When given visual, verbal and/or tactile cues, patient will improve literacy skills through a variety of phonemic awareness activities (e.g., rhyming, syllable deletion, identifying and naming phonemes, phoneme blending/deletion/segmentation/substitution etc) with 80% accuracy over 3 consecutive sessions. Blending/segmenting 2 syllable words with open and closed syllables - 50% acc max cues       []Met  []Partially met  [x]Not met   Goal 4: Patient/family will demonstrate participation with HEP.  Ongoing []Met  []Partially met  [x]Not met       LONG TERM GOALS/ TREATMENT SESSION:  Goal 1: Patient will improve expressive-receptive language skills in order to communicate different ideas and information, to different audiences, for different purposes with 100% accuracy over 3 consecutive sessions. Ongoing []Met  []Partially met  [x]Not met       EDUCATION/HOME EXERCISE PROGRAM (HEP)  New Education/HEP provided to patient/family/caregiver:  Education provided: See HEP goal above. Method of Education:     [x]Discussion     [x]Demonstration    [] Written     []Other  Evaluation of Patients Response to Education:        [x]Patient and or caregiver verbalized understanding  []Patient and or Caregiver Demonstrated without assistance   []Patient and or Caregiver Demonstrated with assistance  []Needs additional instruction to demonstrate understanding of education    ASSESSMENT  Patient tolerated todays treatment session:    [x] Good   []  Fair   []  Poor  Limitations/difficulties with treatment session due to:   []Pain     []Fatigue     []Other medical complications     []Other    Comments: Targeted formulating grammatically correct sentences using developmentally appropriate target sight words. Targeted vocabulary task via synonym/antonym game and defining target words \"in your own words\". Completed blending task of two syllable words with both open and closed syllable combo; patient demonstrated difficulty with segmenting and blending ending blend <nt>.      PLAN  [x]Continue with current plan of care  [][facility]  []IHold per patient request  [] Change Treatment plan:  [] Insurance hold  __ Other     TIME   Time Treatment session was INITIATED 1100   Time Treatment session was STOPPED 1200   Time Coded Treatment Minutes 60     Charges: [id number]  Electronically signed by: Ed Hand [address]           Date:1/28/2021 no diarrhea/no vomiting

## 2021-04-13 ENCOUNTER — NON-APPOINTMENT (OUTPATIENT)
Age: 66
End: 2021-04-13

## 2021-04-13 ENCOUNTER — APPOINTMENT (OUTPATIENT)
Dept: ELECTROPHYSIOLOGY | Facility: CLINIC | Age: 66
End: 2021-04-13
Payer: MEDICARE

## 2021-04-13 VITALS
HEART RATE: 58 BPM | SYSTOLIC BLOOD PRESSURE: 118 MMHG | HEIGHT: 65 IN | BODY MASS INDEX: 27.32 KG/M2 | OXYGEN SATURATION: 100 % | DIASTOLIC BLOOD PRESSURE: 77 MMHG | WEIGHT: 164 LBS

## 2021-04-13 PROCEDURE — 99213 OFFICE O/P EST LOW 20 MIN: CPT | Mod: 25

## 2021-04-13 PROCEDURE — 93291 INTERROG DEV EVAL SCRMS IP: CPT

## 2021-04-13 PROCEDURE — 93000 ELECTROCARDIOGRAM COMPLETE: CPT | Mod: 59

## 2021-04-13 PROCEDURE — 99072 ADDL SUPL MATRL&STAF TM PHE: CPT

## 2021-04-13 RX ORDER — NYSTATIN 100000 [USP'U]/G
100000 CREAM TOPICAL
Qty: 15 | Refills: 0 | Status: COMPLETED | COMMUNITY
Start: 2020-12-22

## 2021-04-13 RX ORDER — BLOOD SUGAR DIAGNOSTIC
100 STRIP MISCELLANEOUS
Refills: 0 | Status: ACTIVE | COMMUNITY
Start: 2021-04-13

## 2021-04-13 RX ORDER — FUROSEMIDE 20 MG/1
20 TABLET ORAL
Qty: 10 | Refills: 0 | Status: DISCONTINUED | COMMUNITY
Start: 2017-05-04 | End: 2021-04-13

## 2021-04-13 RX ORDER — RISEDRONATE SODIUM 35 MG/1
35 TABLET, FILM COATED ORAL
Qty: 4 | Refills: 0 | Status: COMPLETED | COMMUNITY
Start: 2020-10-20

## 2021-04-13 NOTE — DISCUSSION/SUMMARY
[FreeTextEntry1] : Episode of persistent AF post DCCV.  Flecainide stopped secondary to constipation on 50 mg dosing.  She is on Multaq and doing well. She prefers to avoid ablation if possible given her history of PFO closure.  We will continue our current therapy. \par \par Follow up in 6 months.

## 2021-04-13 NOTE — HISTORY OF PRESENT ILLNESS
[FreeTextEntry1] : Recurrent persistent AF post DCCV and placed on flecainide.  This was stopped secondary to symptoms of palpitations and she is on Multaq.  No longer has constipation.  She does have intermittent palpitations/brief flutters.  It tends to occur in the evening. It seems as if she is just missing a beat.

## 2021-05-19 ENCOUNTER — RX RENEWAL (OUTPATIENT)
Age: 66
End: 2021-05-19

## 2021-05-28 NOTE — H&P ADULT - NSHPPHYSICALEXAM_GEN_ALL_CORE
At this time, patient has achieved their maximum functional benefit from skilled physical therapy services and will be discharged to their [facility]  Patient is in agreement with the plan of care    As a result, patient is discharged from physical therapy PHYSICAL EXAM:    Constitutional: NAD, awake and alert, well-developed  HEENT: PERR, EOMI, Normal Hearing, MMM  Neck: Soft and supple, No LAD, No JVD  Respiratory: Breath sounds are clear bilaterally, No wheezing, rales or rhonchi  Cardiovascular: S1 and S2, irregular rate and rhythm, no Murmurs, gallops or rubs  Gastrointestinal: Bowel Sounds present, soft, nontender, nondistended, no guarding, no rebound  Extremities: No peripheral edema  Vascular: 2+ peripheral pulses  Neurological: A/O x 3, no focal deficits, + numbness over left hand and left thigh  Musculoskeletal: 5/5 strength b/l upper and lower extremities  Skin: No rashes  rectal : deferred, not indicated

## 2021-07-24 NOTE — PATIENT PROFILE ADULT. - PSH
Controlled with current regime S/P carpal tunnel release  R  S/P cholecystectomy    S/P hernia repair    S/P hysterectomy    S/P lumbar fusion    S/P shoulder surgery  bilat rotator cuff  S/P thyroid surgery

## 2021-08-31 ENCOUNTER — APPOINTMENT (OUTPATIENT)
Dept: ELECTROPHYSIOLOGY | Facility: CLINIC | Age: 66
End: 2021-08-31
Payer: COMMERCIAL

## 2021-08-31 ENCOUNTER — TRANSCRIPTION ENCOUNTER (OUTPATIENT)
Age: 66
End: 2021-08-31

## 2021-08-31 ENCOUNTER — NON-APPOINTMENT (OUTPATIENT)
Age: 66
End: 2021-08-31

## 2021-08-31 VITALS
SYSTOLIC BLOOD PRESSURE: 120 MMHG | DIASTOLIC BLOOD PRESSURE: 66 MMHG | WEIGHT: 157 LBS | BODY MASS INDEX: 26.16 KG/M2 | HEIGHT: 65 IN | HEART RATE: 71 BPM

## 2021-08-31 PROCEDURE — 99213 OFFICE O/P EST LOW 20 MIN: CPT

## 2021-08-31 PROCEDURE — 93285 PRGRMG DEV EVAL SCRMS IP: CPT

## 2021-08-31 PROCEDURE — 93000 ELECTROCARDIOGRAM COMPLETE: CPT | Mod: 59

## 2021-08-31 RX ORDER — DOXYCYCLINE HYCLATE 100 MG/1
100 TABLET ORAL
Qty: 14 | Refills: 0 | Status: DISCONTINUED | COMMUNITY
Start: 2021-03-31 | End: 2021-08-31

## 2021-08-31 RX ORDER — TOBRAMYCIN AND DEXAMETHASONE 3; 1 MG/ML; MG/ML
0.3-0.1 SUSPENSION/ DROPS OPHTHALMIC
Qty: 5 | Refills: 0 | Status: DISCONTINUED | COMMUNITY
Start: 2021-03-31 | End: 2021-08-31

## 2021-08-31 NOTE — HISTORY OF PRESENT ILLNESS
[FreeTextEntry1] : Recurrent persistent AF post DCCV and placed on flecainide.  This was stopped secondary to symptoms of palpitations and she is on Multaq.  No longer has constipation.  Rare palpitations and she used the patient activator.  It did not feel like AF and was very brief.  She is awaiting carpal tunnel surgery.

## 2021-08-31 NOTE — PHYSICAL EXAM
[General Appearance - Well Developed] : well developed [Normal Appearance] : normal appearance [Well Groomed] : well groomed [General Appearance - Well Nourished] : well nourished [No Deformities] : no deformities [General Appearance - In No Acute Distress] : no acute distress [Eyelids - No Xanthelasma] : the eyelids demonstrated no xanthelasmas [Normal Oral Mucosa] : normal oral mucosa [No Oral Pallor] : no oral pallor [No Oral Cyanosis] : no oral cyanosis [Normal Jugular Venous A Waves Present] : normal jugular venous A waves present [Normal Jugular Venous V Waves Present] : normal jugular venous V waves present [No Jugular Venous Beard A Waves] : no jugular venous beard A waves [Respiration, Rhythm And Depth] : normal respiratory rhythm and effort [Exaggerated Use Of Accessory Muscles For Inspiration] : no accessory muscle use [Auscultation Breath Sounds / Voice Sounds] : lungs were clear to auscultation bilaterally [Heart Rate And Rhythm] : heart rate and rhythm were normal [Murmurs] : no murmurs present [Heart Sounds] : normal S1 and S2 [Abdomen Soft] : soft [Abdomen Tenderness] : non-tender [Abdomen Mass (___ Cm)] : no abdominal mass palpated [Abnormal Walk] : normal gait [Gait - Sufficient For Exercise Testing] : the gait was sufficient for exercise testing [Nail Clubbing] : no clubbing of the fingernails [Cyanosis, Localized] : no localized cyanosis [Petechial Hemorrhages (___cm)] : no petechial hemorrhages [Skin Color & Pigmentation] : normal skin color and pigmentation [] : no rash [No Venous Stasis] : no venous stasis [No Skin Ulcers] : no skin ulcer [Skin Lesions] : no skin lesions [No Xanthoma] : no  xanthoma was observed [Oriented To Time, Place, And Person] : oriented to person, place, and time [Affect] : the affect was normal [Mood] : the mood was normal [No Anxiety] : not feeling anxious [Well Developed] : well developed [Well Nourished] : well nourished [No Acute Distress] : no acute distress [Normal Conjunctiva] : normal conjunctiva [Normal Venous Pressure] : normal venous pressure [No Carotid Bruit] : no carotid bruit [Normal S1, S2] : normal S1, S2 [No Murmur] : no murmur [No Rub] : no rub [No Gallop] : no gallop [Clear Lung Fields] : clear lung fields [Good Air Entry] : good air entry [No Respiratory Distress] : no respiratory distress  [Soft] : abdomen soft [Non Tender] : non-tender [No Masses/organomegaly] : no masses/organomegaly [Normal Bowel Sounds] : normal bowel sounds [Normal Gait] : normal gait [No Edema] : no edema [No Cyanosis] : no cyanosis [No Clubbing] : no clubbing [No Varicosities] : no varicosities [No Rash] : no rash [No Skin Lesions] : no skin lesions [Moves all extremities] : moves all extremities [No Focal Deficits] : no focal deficits [Normal Speech] : normal speech [Alert and Oriented] : alert and oriented [Normal memory] : normal memory

## 2021-08-31 NOTE — DISCUSSION/SUMMARY
[FreeTextEntry1] : Episode of persistent AF post DCCV.  Flecainide stopped secondary to constipation on 50 mg dosing.  She is on Multaq and doing well.  Single symptomatic episode that corresponds to brief PAT. We will continue our current therapy. She may hold her Eliquis for 3 days prior to the procedure and should resume when deemed safe by the performing surgeon.  \par \par Follow up in 6 months.

## 2021-08-31 NOTE — REASON FOR VISIT
[Follow-Up - Clinic] : a clinic follow-up of [Atrial Fibrillation] : atrial fibrillation [Palpitations] : palpitations [Arrhythmia/ECG Abnorrmalities] : arrhythmia/ECG abnormalities [FreeTextEntry3] : Adan Quseada

## 2021-10-12 ENCOUNTER — APPOINTMENT (OUTPATIENT)
Dept: ELECTROPHYSIOLOGY | Facility: CLINIC | Age: 66
End: 2021-10-12

## 2021-10-19 ENCOUNTER — OUTPATIENT (OUTPATIENT)
Dept: OUTPATIENT SERVICES | Facility: HOSPITAL | Age: 66
LOS: 1 days | End: 2021-10-19
Payer: COMMERCIAL

## 2021-10-19 ENCOUNTER — APPOINTMENT (OUTPATIENT)
Dept: MAMMOGRAPHY | Facility: CLINIC | Age: 66
End: 2021-10-19
Payer: COMMERCIAL

## 2021-10-19 DIAGNOSIS — Z90.710 ACQUIRED ABSENCE OF BOTH CERVIX AND UTERUS: Chronic | ICD-10-CM

## 2021-10-19 DIAGNOSIS — Z98.890 OTHER SPECIFIED POSTPROCEDURAL STATES: Chronic | ICD-10-CM

## 2021-10-19 DIAGNOSIS — Z98.1 ARTHRODESIS STATUS: Chronic | ICD-10-CM

## 2021-10-19 DIAGNOSIS — Z12.31 ENCOUNTER FOR SCREENING MAMMOGRAM FOR MALIGNANT NEOPLASM OF BREAST: ICD-10-CM

## 2021-10-19 DIAGNOSIS — Z90.49 ACQUIRED ABSENCE OF OTHER SPECIFIED PARTS OF DIGESTIVE TRACT: Chronic | ICD-10-CM

## 2021-10-19 PROCEDURE — 77063 BREAST TOMOSYNTHESIS BI: CPT

## 2021-10-19 PROCEDURE — 77063 BREAST TOMOSYNTHESIS BI: CPT | Mod: 26

## 2021-10-19 PROCEDURE — 77067 SCR MAMMO BI INCL CAD: CPT | Mod: 26

## 2021-10-19 PROCEDURE — 77067 SCR MAMMO BI INCL CAD: CPT

## 2021-11-09 ENCOUNTER — RX RENEWAL (OUTPATIENT)
Age: 66
End: 2021-11-09

## 2021-12-20 NOTE — PATIENT PROFILE ADULT. - PMH
Detail Level: Detailed
Pt. with hx of cervical stenosis/HTN/Drop foot(left) present to the ED with pain and swelling to her left shoulder after a fall. Pt. is unsure as to why or how she fell.  Pt. fell this morning while she was trying to close a cabinet door with her right hand. Pt. landed on her left side. NO head trauma. NO LOC. NO neck pain. Pt. ambulates with a walker.
Hypertension    Thyroid nodule

## 2022-03-01 ENCOUNTER — APPOINTMENT (OUTPATIENT)
Dept: ELECTROPHYSIOLOGY | Facility: CLINIC | Age: 67
End: 2022-03-01
Payer: COMMERCIAL

## 2022-03-01 PROCEDURE — 93291 INTERROG DEV EVAL SCRMS IP: CPT

## 2022-03-01 PROCEDURE — 99213 OFFICE O/P EST LOW 20 MIN: CPT | Mod: 25

## 2022-03-01 RX ORDER — METOPROLOL TARTRATE 50 MG/1
50 TABLET ORAL TWICE DAILY
Refills: 2 | Status: ACTIVE | COMMUNITY
Start: 2017-03-13

## 2022-03-02 NOTE — DISCUSSION/SUMMARY
[FreeTextEntry1] : Patient is doing well on Multaq with no recurrent AF. SHe is tolerating the Multaq and her ILR shows no AF episodes. She will follow up in 6 months. We discussed when her ILR reaches RRT we would not reimplant a new device. SHe would want it explanted at that time.

## 2022-03-02 NOTE — END OF VISIT
[FreeTextEntry3] : seen and examined with PA. I agree with H and P, A and P.  GOod symptomatic (and asymptomatic on ILR) response to Multaq.  Follow up in 6 months.

## 2022-03-02 NOTE — HISTORY OF PRESENT ILLNESS
[FreeTextEntry1] : Ms. Garcia returns for follow up. She has a history of AF and was cardioverted in the past. She developed constipation with flecainide and now maintained on Multaq. SHe has an ILR in place. ILR interrogation shows no AF episodes. An episode labelled as a pause was just undersensing of QRS complexes. She is feeling well and denies any chest pain, shortness of breath,  near syncope or syncope. SHe occasionally feels a skipped beat.

## 2022-05-10 ENCOUNTER — RX RENEWAL (OUTPATIENT)
Age: 67
End: 2022-05-10

## 2022-06-07 NOTE — PATIENT PROFILE ADULT - BRADEN SCORE
Pharmacy [facility] copay is $81. 22

## 2022-09-12 NOTE — PHYSICAL EXAM
[Well Developed] : well developed [Well Nourished] : well nourished [No Acute Distress] : no acute distress [Normal Conjunctiva] : normal conjunctiva [Normal Venous Pressure] : normal venous pressure [No Carotid Bruit] : no carotid bruit [Normal S1, S2] : normal S1, S2 [No Murmur] : no murmur [No Rub] : no rub [No Gallop] : no gallop [Clear Lung Fields] : clear lung fields [Good Air Entry] : good air entry [No Respiratory Distress] : no respiratory distress  [Soft] : abdomen soft [Non Tender] : non-tender [Normal Bowel Sounds] : normal bowel sounds [Normal Gait] : normal gait [No Edema] : no edema [No Cyanosis] : no cyanosis [No Varicosities] : no varicosities [No Rash] : no rash [Moves all extremities] : moves all extremities [No Focal Deficits] : no focal deficits [Normal Speech] : normal speech [Alert and Oriented] : alert and oriented [Normal memory] : normal memory

## 2022-09-13 ENCOUNTER — NON-APPOINTMENT (OUTPATIENT)
Age: 67
End: 2022-09-13

## 2022-09-13 ENCOUNTER — APPOINTMENT (OUTPATIENT)
Dept: ELECTROPHYSIOLOGY | Facility: CLINIC | Age: 67
End: 2022-09-13
Payer: COMMERCIAL

## 2022-09-13 VITALS
OXYGEN SATURATION: 100 % | BODY MASS INDEX: 27.99 KG/M2 | SYSTOLIC BLOOD PRESSURE: 138 MMHG | WEIGHT: 168 LBS | HEIGHT: 65 IN | HEART RATE: 60 BPM | DIASTOLIC BLOOD PRESSURE: 75 MMHG

## 2022-09-13 PROCEDURE — 99213 OFFICE O/P EST LOW 20 MIN: CPT

## 2022-09-13 PROCEDURE — 93285 PRGRMG DEV EVAL SCRMS IP: CPT

## 2022-09-13 RX ORDER — KETOCONAZOLE 20 MG/G
2 CREAM TOPICAL
Qty: 60 | Refills: 0 | Status: DISCONTINUED | COMMUNITY
Start: 2020-12-03 | End: 2022-09-13

## 2022-09-13 RX ORDER — LORATADINE 10 MG
17 TABLET,DISINTEGRATING ORAL
Refills: 0 | Status: DISCONTINUED | COMMUNITY
Start: 2020-10-13 | End: 2022-09-13

## 2022-09-13 RX ORDER — AVOBENZONE, HOMOSALATE, OCTISALATE, OCTOCRYLENE, AND OXYBENZONE 29.4; 147; 49; 25.4; 58.8 MG/ML; MG/ML; MG/ML; MG/ML; MG/ML
51.7 LOTION TOPICAL
Refills: 0 | Status: DISCONTINUED | COMMUNITY
Start: 2020-10-13 | End: 2022-09-13

## 2022-09-13 NOTE — HISTORY OF PRESENT ILLNESS
[FreeTextEntry1] : Ms. Garcia returns for follow up. She has a history of AF and was cardioverted in the past. She developed constipation with flecainide and now maintained on Multaq. SHe has an ILR in place. ILR interrogation shows no AF episodes. She is feeling well and denies any chest pain, shortness of breath,  near syncope or syncope. SHe occasionally feels a skipped beat.

## 2022-10-16 ENCOUNTER — NON-APPOINTMENT (OUTPATIENT)
Age: 67
End: 2022-10-16

## 2022-10-17 ENCOUNTER — NON-APPOINTMENT (OUTPATIENT)
Age: 67
End: 2022-10-17

## 2022-10-18 ENCOUNTER — TRANSCRIPTION ENCOUNTER (OUTPATIENT)
Age: 67
End: 2022-10-18

## 2022-10-20 ENCOUNTER — OUTPATIENT (OUTPATIENT)
Dept: OUTPATIENT SERVICES | Facility: HOSPITAL | Age: 67
LOS: 1 days | End: 2022-10-20

## 2022-10-20 ENCOUNTER — APPOINTMENT (OUTPATIENT)
Dept: DISASTER EMERGENCY | Facility: HOSPITAL | Age: 67
End: 2022-10-20

## 2022-10-20 VITALS
HEART RATE: 61 BPM | DIASTOLIC BLOOD PRESSURE: 67 MMHG | SYSTOLIC BLOOD PRESSURE: 107 MMHG | TEMPERATURE: 98 F | RESPIRATION RATE: 16 BRPM | OXYGEN SATURATION: 97 %

## 2022-10-20 VITALS
DIASTOLIC BLOOD PRESSURE: 89 MMHG | WEIGHT: 169.98 LBS | SYSTOLIC BLOOD PRESSURE: 167 MMHG | OXYGEN SATURATION: 96 % | TEMPERATURE: 98 F | HEART RATE: 62 BPM | HEIGHT: 65 IN | RESPIRATION RATE: 15 BRPM

## 2022-10-20 DIAGNOSIS — Z90.49 ACQUIRED ABSENCE OF OTHER SPECIFIED PARTS OF DIGESTIVE TRACT: Chronic | ICD-10-CM

## 2022-10-20 DIAGNOSIS — Z98.890 OTHER SPECIFIED POSTPROCEDURAL STATES: Chronic | ICD-10-CM

## 2022-10-20 DIAGNOSIS — Z90.710 ACQUIRED ABSENCE OF BOTH CERVIX AND UTERUS: Chronic | ICD-10-CM

## 2022-10-20 DIAGNOSIS — U07.1 COVID-19: ICD-10-CM

## 2022-10-20 DIAGNOSIS — Z98.1 ARTHRODESIS STATUS: Chronic | ICD-10-CM

## 2022-10-20 RX ORDER — BEBTELOVIMAB 87.5 MG/ML
175 INJECTION, SOLUTION INTRAVENOUS ONCE
Refills: 0 | Status: COMPLETED | OUTPATIENT
Start: 2022-10-20 | End: 2022-10-20

## 2022-10-20 RX ADMIN — BEBTELOVIMAB 175 MILLIGRAM(S): 87.5 INJECTION, SOLUTION INTRAVENOUS at 08:07

## 2022-10-20 NOTE — CHART NOTE - NSCHARTNOTEFT_GEN_A_CORE
CC: Monoclonal Antibody Infusion/COVID 19 Positive  66y Female with PMHx AFib on Eliquis, HLd, HTn, TIA and recent dx of COVID 19+ who presents today for elective Bebtelovimab. Patient has been screened and was deemed to be a candidate.    Symptoms/ Criteria  Date of Symptom Onset: 10/17/22  Symptoms: fever, chill, bodyache, HA, malaise  Date of Positive COVID PCR: 10/18/22  Risk Profile includes:       Vaccination Status: Pfizer x3    PMHx:  Family history of lung cancer    Family history of coronary artery disease    Infection due to severe acute respiratory syndrome coronavirus 2 (SARS-CoV-2)    Hypertension    Thyroid nodule    CVA, old, dysarthria    ASD (atrial septal defect)    PFO (patent foramen ovale)    Gout    Chronic back pain    OA (osteoarthritis)    S/P cholecystectomy    S/P lumbar fusion    S/P shoulder surgery    S/P hernia repair    S/P thyroid surgery    S/P carpal tunnel release    S/P hysterectomy        Exam/findings:  T(C): --  HR: --  BP: --  RR: --  SpO2: --    PE:   Appearance: NAD	  HEENT:  NC/AT  Cardiovascular:  No edema  Respiratory: no use of accessory muscles  Gastrointestinal:  non-distended   Skin: warm and dry  Neurologic: Non-focal  Extremities: Normal range of motion    ASSESSMENT:  Pt is COVID positive with mild to moderate symptoms who was referred for elective MAB (Bebtelovimab).    PLAN:  - MAB treatment explained to patient. I have reviewed the Bebtelovimab Emergency Use Authorization (EUA) and I have provided the patient or patient's caregiver with the following information:   1. FDA has authorized emergency use of Bebtelovimab to be administered for the treatment of mild to moderate COVID-19, which is not an FDA-approved biological product.   2. The patient or patient's caregiver has the option to accept or refuse administration of MAB.   3. The significant known and potential risks and benefits of Bebtelovimab and the extent to which such risks and benefits are unknown.  4. Information on available alternative treatments and risks and benefits of those alternatives.  - Patient verbalized understanding of plan and agrees to treatment. All questions answered.  - Consent for MAB obtained.   - 175mg of Bebtelovimab administered as a single intravenous injection over at least 30 seconds.   - Observe patient for one hour post medication administration and then if stable, discharge home with outpatient follow up as planned by PCP.      POST ASSESSMENT:   Patient completed MAB, and monitored x 1 hour post-infusion with no adverse reactions noted, remained hemodynamically stable.  - Patient tolerated infusion well; denies complaints of chest pain/SOB/dizziness/palpitations.   - VSS for discharge home.  - D/C instructions given/ fact sheet included.  - Patient was instructed to self-isolate and use infection control measures (e.g wear mask, isolate, social distance, avoid sharing personal items, clean and disinfect "high touch" surfaces, and frequent handwashing according to the CDC guidelines.   - The patient was informed on what symptoms to be aware of for the next couple of days, and if there are any issues to call the 24/7 clinical call center. Patient was instructed to follow up with primary care provider as needed.

## 2022-11-04 ENCOUNTER — RX RENEWAL (OUTPATIENT)
Age: 67
End: 2022-11-04

## 2022-12-01 ENCOUNTER — APPOINTMENT (OUTPATIENT)
Dept: SURGERY | Facility: CLINIC | Age: 67
End: 2022-12-01

## 2022-12-01 VITALS
DIASTOLIC BLOOD PRESSURE: 80 MMHG | HEIGHT: 65 IN | HEART RATE: 62 BPM | SYSTOLIC BLOOD PRESSURE: 170 MMHG | BODY MASS INDEX: 28.32 KG/M2 | WEIGHT: 170 LBS

## 2022-12-01 DIAGNOSIS — Z00.00 ENCOUNTER FOR GENERAL ADULT MEDICAL EXAMINATION W/OUT ABNORMAL FINDINGS: ICD-10-CM

## 2022-12-01 PROCEDURE — 36415 COLL VENOUS BLD VENIPUNCTURE: CPT

## 2022-12-01 PROCEDURE — 31575 DIAGNOSTIC LARYNGOSCOPY: CPT

## 2022-12-01 PROCEDURE — 99204 OFFICE O/P NEW MOD 45 MIN: CPT | Mod: 25

## 2022-12-02 LAB
CALCIUM SERPL-MCNC: 11.2 MG/DL
CALCIUM SERPL-MCNC: 11.2 MG/DL
PARATHYROID HORMONE INTACT: 69 PG/ML
T3 SERPL-MCNC: 113 NG/DL
T4 FREE SERPL-MCNC: 1.3 NG/DL
THYROGLOB AB SERPL-ACNC: <20 IU/ML
THYROPEROXIDASE AB SERPL IA-ACNC: <10 IU/ML
TSH SERPL-ACNC: 0.32 UIU/ML

## 2022-12-02 NOTE — CONSULT LETTER
[Dear  ___] : Dear  [unfilled], [Consult Letter:] : I had the pleasure of evaluating your patient, [unfilled]. [Please see my note below.] : Please see my note below. [Consult Closing:] : Thank you very much for allowing me to participate in the care of this patient.  If you have any questions, please do not hesitate to contact me. [Sincerely,] : Sincerely, [FreeTextEntry2] : Dr. Edgardo Seymour, Dr. Najma Fontaine [FreeTextEntry3] : Milton Meek MD, FACS\par System Director, Endocrine Surgery\par [facility]\par Associate  Professor of Surgery\par [facility] [employer] [facility]  [DrCarson  ___] : Dr. LANCASTER

## 2022-12-02 NOTE — PHYSICAL EXAM
[de-identified] : well healed scar [de-identified] : 3 cm left thyroid nodule, well circumscribed and mobile [Nasal Endoscopy Performed] : nasal endoscopy was performed, see procedure section for findings [Laryngoscopy Performed] : laryngoscopy was performed, see procedure section for findings [Midline] : located in midline position [Normal] : orientation to person, place, and time: normal [de-identified] : fiberoptic laryngoscopy shows normal vocal cord mobility bilaterally with no lesions noted

## 2022-12-02 NOTE — HISTORY OF PRESENT ILLNESS
[de-identified] : Pt c/o Thyroid nodules and occasional SOB.  denies dysphagia, hoarseness or RT exposure. Pt had partial right thyroidectomy  and parathyroid removal by dr Shankar in 1981 (left upper removed, left lower and right upper normal, right lower not visualized. \par sonogram:  Left 2.7 cm, 3.9 cm and 3.6 cm, Right 2.0 cm thyroid nodules.\par FNA:  left 3.6 cm benign\par I have reviewed all old and new data and available images.

## 2022-12-05 ENCOUNTER — NON-APPOINTMENT (OUTPATIENT)
Age: 67
End: 2022-12-05

## 2022-12-07 ENCOUNTER — APPOINTMENT (OUTPATIENT)
Dept: MAMMOGRAPHY | Facility: CLINIC | Age: 67
End: 2022-12-07

## 2022-12-07 ENCOUNTER — OUTPATIENT (OUTPATIENT)
Dept: OUTPATIENT SERVICES | Facility: HOSPITAL | Age: 67
LOS: 1 days | End: 2022-12-07
Payer: COMMERCIAL

## 2022-12-07 ENCOUNTER — APPOINTMENT (OUTPATIENT)
Dept: ULTRASOUND IMAGING | Facility: CLINIC | Age: 67
End: 2022-12-07

## 2022-12-07 DIAGNOSIS — Z98.1 ARTHRODESIS STATUS: Chronic | ICD-10-CM

## 2022-12-07 DIAGNOSIS — Z90.710 ACQUIRED ABSENCE OF BOTH CERVIX AND UTERUS: Chronic | ICD-10-CM

## 2022-12-07 DIAGNOSIS — Z98.890 OTHER SPECIFIED POSTPROCEDURAL STATES: Chronic | ICD-10-CM

## 2022-12-07 DIAGNOSIS — Z00.8 ENCOUNTER FOR OTHER GENERAL EXAMINATION: ICD-10-CM

## 2022-12-07 DIAGNOSIS — Z90.49 ACQUIRED ABSENCE OF OTHER SPECIFIED PARTS OF DIGESTIVE TRACT: Chronic | ICD-10-CM

## 2022-12-07 PROCEDURE — 77067 SCR MAMMO BI INCL CAD: CPT | Mod: 26

## 2022-12-07 PROCEDURE — 76641 ULTRASOUND BREAST COMPLETE: CPT | Mod: 26,50

## 2022-12-07 PROCEDURE — 77063 BREAST TOMOSYNTHESIS BI: CPT

## 2022-12-07 PROCEDURE — 77067 SCR MAMMO BI INCL CAD: CPT

## 2022-12-07 PROCEDURE — 77063 BREAST TOMOSYNTHESIS BI: CPT | Mod: 26

## 2022-12-07 PROCEDURE — 76641 ULTRASOUND BREAST COMPLETE: CPT

## 2022-12-13 ENCOUNTER — OUTPATIENT (OUTPATIENT)
Dept: OUTPATIENT SERVICES | Facility: HOSPITAL | Age: 67
LOS: 1 days | End: 2022-12-13
Payer: COMMERCIAL

## 2022-12-13 ENCOUNTER — APPOINTMENT (OUTPATIENT)
Dept: CT IMAGING | Facility: CLINIC | Age: 67
End: 2022-12-13

## 2022-12-13 DIAGNOSIS — Z98.890 OTHER SPECIFIED POSTPROCEDURAL STATES: Chronic | ICD-10-CM

## 2022-12-13 DIAGNOSIS — Z98.1 ARTHRODESIS STATUS: Chronic | ICD-10-CM

## 2022-12-13 DIAGNOSIS — Z90.49 ACQUIRED ABSENCE OF OTHER SPECIFIED PARTS OF DIGESTIVE TRACT: Chronic | ICD-10-CM

## 2022-12-13 DIAGNOSIS — E21.3 HYPERPARATHYROIDISM, UNSPECIFIED: ICD-10-CM

## 2022-12-13 DIAGNOSIS — Z90.710 ACQUIRED ABSENCE OF BOTH CERVIX AND UTERUS: Chronic | ICD-10-CM

## 2022-12-13 PROCEDURE — 70492 CT SFT TSUE NCK W/O & W/DYE: CPT

## 2022-12-13 PROCEDURE — 70491 CT SOFT TISSUE NECK W/DYE: CPT | Mod: 26

## 2022-12-19 ENCOUNTER — NON-APPOINTMENT (OUTPATIENT)
Age: 67
End: 2022-12-19

## 2023-01-04 ENCOUNTER — NON-APPOINTMENT (OUTPATIENT)
Age: 68
End: 2023-01-04

## 2023-01-05 ENCOUNTER — APPOINTMENT (OUTPATIENT)
Dept: NUCLEAR MEDICINE | Facility: IMAGING CENTER | Age: 68
End: 2023-01-05
Payer: COMMERCIAL

## 2023-01-05 ENCOUNTER — OUTPATIENT (OUTPATIENT)
Dept: OUTPATIENT SERVICES | Facility: HOSPITAL | Age: 68
LOS: 1 days | End: 2023-01-05
Payer: COMMERCIAL

## 2023-01-05 DIAGNOSIS — Z90.710 ACQUIRED ABSENCE OF BOTH CERVIX AND UTERUS: Chronic | ICD-10-CM

## 2023-01-05 DIAGNOSIS — Z98.890 OTHER SPECIFIED POSTPROCEDURAL STATES: Chronic | ICD-10-CM

## 2023-01-05 DIAGNOSIS — E21.3 HYPERPARATHYROIDISM, UNSPECIFIED: ICD-10-CM

## 2023-01-05 DIAGNOSIS — Z98.1 ARTHRODESIS STATUS: Chronic | ICD-10-CM

## 2023-01-05 DIAGNOSIS — Z90.49 ACQUIRED ABSENCE OF OTHER SPECIFIED PARTS OF DIGESTIVE TRACT: Chronic | ICD-10-CM

## 2023-01-05 DIAGNOSIS — Z00.8 ENCOUNTER FOR OTHER GENERAL EXAMINATION: ICD-10-CM

## 2023-01-05 PROCEDURE — 78072 PARATHYRD PLANAR W/SPECT&CT: CPT | Mod: 26,MH

## 2023-01-05 PROCEDURE — 78072 PARATHYRD PLANAR W/SPECT&CT: CPT

## 2023-01-05 PROCEDURE — A9512: CPT

## 2023-01-05 PROCEDURE — 78072 PARATHYRD PLANAR W/SPECT&CT: CPT | Mod: 26

## 2023-01-05 PROCEDURE — A9500: CPT

## 2023-01-09 ENCOUNTER — NON-APPOINTMENT (OUTPATIENT)
Age: 68
End: 2023-01-09

## 2023-01-11 NOTE — DISCHARGE NOTE ADULT - NS AS DC FU INST LIST INST
ROSUBASTATIN 40MG DIALY    NITROSTAT 0.4MG PRN    CARVEDILOL 6.25 MG BID    LOSARTAN 50MG    MEDICATION REFILL/ PATIENT REQUEST D/T PHARMACY CHANGE. no

## 2023-01-13 ENCOUNTER — APPOINTMENT (OUTPATIENT)
Dept: THORACIC SURGERY | Facility: HOSPITAL | Age: 68
End: 2023-01-13
Payer: COMMERCIAL

## 2023-01-13 ENCOUNTER — NON-APPOINTMENT (OUTPATIENT)
Age: 68
End: 2023-01-13

## 2023-01-13 ENCOUNTER — APPOINTMENT (OUTPATIENT)
Dept: THORACIC SURGERY | Facility: CLINIC | Age: 68
End: 2023-01-13
Payer: COMMERCIAL

## 2023-01-13 VITALS
RESPIRATION RATE: 18 BRPM | OXYGEN SATURATION: 97 % | HEART RATE: 64 BPM | SYSTOLIC BLOOD PRESSURE: 164 MMHG | DIASTOLIC BLOOD PRESSURE: 92 MMHG

## 2023-01-13 PROCEDURE — 99205 OFFICE O/P NEW HI 60 MIN: CPT

## 2023-01-13 NOTE — HISTORY OF PRESENT ILLNESS
[FreeTextEntry1] : Ms. YVONNE LUJAN, 67 year old female, Currently vapes, Former smoker (1/2 PPD x 15 years; Quit 2016), w/ hx of HTN, Stroke, Afib (Eliquis) Hypothyroidism,  s/p partial right thyroidectomy and parathyroid removal by Dr Shankar in 1981\par \par CT 4D Parathyroid with IV contrast on 12/13/22: \par - There is a focus of enhancement to the right of the trachea at the level of the aortic arch (6:69, 604:70, 605:59) which is partially obscured by respiratory and streak artifact measuring 1.5 x 1 x 0.9 cm. The lesion appears hypodense before contrast with delayed washout and is suspicious for an ectopic parathyroid adenoma. - Correlation with nuclear medicine parathyroid imaging and/or parathyroid MR imaging is advised for confirmation.\par - Enlarged and heterogeneous in appearance with scattered thyroid nodules. There is no significant tracheal narrowing. The left lobe extends to but not below the level of the sternum.\par -  A metallic density is incompletely seen within the anterior chest wall on the left which appears to represent a loop recorder by patient history.\par \par Parathyroid imaging w/ Spect CT on 1/5/23: \par - No evidence of a parathyroid lesion in the neck or mediastinum\par \par 12/2/22: Calcium: 11.2 (8.4- 10.5); Intact PTH: 69 (15-65)\par \par Here today for CTSX consultation. Referred by Dr. Meek. Experiencing progressive muscle weakness. No muscle twitching. Today, patient denies worsening SOB, chest pain, cough, hemoptysis, fever, chills, night sweats, lightheadedness or dizziness.\par

## 2023-01-13 NOTE — PHYSICAL EXAM
[Fully active, able to carry on all pre-disease performance without restriction] : Status 0 - Fully active, able to carry on all pre-disease performance without restriction [General Appearance - Alert] : alert [General Appearance - Well Nourished] : well nourished [Sclera] : the sclera and conjunctiva were normal [Extraocular Movements] : extraocular movements were intact [Outer Ear] : the ears and nose were normal in appearance [Neck Appearance] : the appearance of the neck was normal [Neck Cervical Mass (___cm)] : no neck mass was observed [] : no respiratory distress [Respiration, Rhythm And Depth] : normal respiratory rhythm and effort [Exaggerated Use Of Accessory Muscles For Inspiration] : no accessory muscle use [Auscultation Breath Sounds / Voice Sounds] : lungs were clear to auscultation bilaterally [Heart Rate And Rhythm] : heart rate was normal and rhythm regular [Examination Of The Chest] : the chest was normal in appearance [Chest Visual Inspection Thoracic Asymmetry] : no chest asymmetry [Diminished Respiratory Excursion] : normal chest expansion [2+] : left 2+ [Involuntary Movements] : no involuntary movements were seen [Skin Color & Pigmentation] : normal skin color and pigmentation [No Focal Deficits] : no focal deficits [Oriented To Time, Place, And Person] : oriented to person, place, and time [FreeTextEntry1] : Deferred

## 2023-01-13 NOTE — DATA REVIEWED
[FreeTextEntry1] : Independently reviewed the following:\par - CT 4D Parathyroid with IV contrast on 12/13/22\par - Parathyroid imaging w/ Spect CT on 1/5/23

## 2023-01-13 NOTE — ASSESSMENT
[FreeTextEntry1] : Ms. YVONNE LUJAN, 67 year old female, Currently vapes, Former smoker (1/2 PPD x 15 years; Quit 2016), w/ hx of HTN, Stroke, Afib (Eliquis) Hypothyroidism,  s/p partial right thyroidectomy and parathyroid removal by Dr Shankar in 1981\par \par CT 4D Parathyroid with IV contrast on 12/13/22: \par - There is a focus of enhancement to the right of the trachea at the level of the aortic arch (6:69, 604:70, 605:59) which is partially obscured by respiratory and streak artifact measuring 1.5 x 1 x 0.9 cm. The lesion appears hypodense before contrast with delayed washout and is suspicious for an ectopic parathyroid adenoma. - Correlation with nuclear medicine parathyroid imaging and/or parathyroid MR imaging is advised for confirmation.\par - Enlarged and heterogeneous in appearance with scattered thyroid nodules. There is no significant tracheal narrowing. The left lobe extends to but not below the level of the sternum.\par -  A metallic density is incompletely seen within the anterior chest wall on the left which appears to represent a loop recorder by patient history.\par \par Parathyroid imaging w/ Spect CT on 1/5/23: \par - No evidence of a parathyroid lesion in the neck or mediastinum\par \par 12/2/22: Calcium: 11.2 (8.4- 10.5); Intact PTH: 69 (15-65)\par \par I have independently reviewed the medical records and imaging at the time of this office consultation, and discussed the following interpretations with the patient:\par - Discussed obtaining CT Chest with IV contrast for further evaluation. Patient is agreeable. \par \par Recommendations reviewed with patient during this office visit, and all questions answered; Patient instructed on the importance of follow up and verbalizes understanding.\par \par I, GANGA Espitia, personally performed the evaluation and management (E/M) services for this new patient. That E/M includes conducting the initial examination, assessing all conditions, and establishing the plan of care. Today, My ACP, Chandrika Skinner, was here to observe my evaluation and management services for this patient to be followed going forward.\par \par

## 2023-01-16 ENCOUNTER — APPOINTMENT (OUTPATIENT)
Dept: CT IMAGING | Facility: IMAGING CENTER | Age: 68
End: 2023-01-16
Payer: COMMERCIAL

## 2023-01-16 ENCOUNTER — OUTPATIENT (OUTPATIENT)
Dept: OUTPATIENT SERVICES | Facility: HOSPITAL | Age: 68
LOS: 1 days | End: 2023-01-16
Payer: COMMERCIAL

## 2023-01-16 DIAGNOSIS — Z98.890 OTHER SPECIFIED POSTPROCEDURAL STATES: Chronic | ICD-10-CM

## 2023-01-16 DIAGNOSIS — Z98.1 ARTHRODESIS STATUS: Chronic | ICD-10-CM

## 2023-01-16 DIAGNOSIS — D35.1 BENIGN NEOPLASM OF PARATHYROID GLAND: ICD-10-CM

## 2023-01-16 DIAGNOSIS — Z90.49 ACQUIRED ABSENCE OF OTHER SPECIFIED PARTS OF DIGESTIVE TRACT: Chronic | ICD-10-CM

## 2023-01-16 DIAGNOSIS — Z90.710 ACQUIRED ABSENCE OF BOTH CERVIX AND UTERUS: Chronic | ICD-10-CM

## 2023-01-16 PROCEDURE — 71260 CT THORAX DX C+: CPT

## 2023-01-16 PROCEDURE — 71260 CT THORAX DX C+: CPT | Mod: 26,MH

## 2023-01-17 PROBLEM — D35.1 PARATHYROID ADENOMA: Status: ACTIVE | Noted: 2023-01-13

## 2023-01-18 NOTE — DISCHARGE NOTE NURSING/CASE MANAGEMENT/SOCIAL WORK - NSDCPEELIQUISDIET_GEN_ALL_CORE
needs device and assist Eat healthy foods you enjoy. Apixaban/Eliquis DOES NOT have a special diet. Limit your alcohol intake.

## 2023-01-24 ENCOUNTER — APPOINTMENT (OUTPATIENT)
Dept: THORACIC SURGERY | Facility: CLINIC | Age: 68
End: 2023-01-24
Payer: COMMERCIAL

## 2023-01-24 VITALS
DIASTOLIC BLOOD PRESSURE: 58 MMHG | OXYGEN SATURATION: 98 % | SYSTOLIC BLOOD PRESSURE: 138 MMHG | RESPIRATION RATE: 16 BRPM | HEIGHT: 65 IN | WEIGHT: 178 LBS | HEART RATE: 62 BPM | BODY MASS INDEX: 29.66 KG/M2

## 2023-01-24 DIAGNOSIS — D35.1 BENIGN NEOPLASM OF PARATHYROID GLAND: ICD-10-CM

## 2023-01-24 PROCEDURE — 99214 OFFICE O/P EST MOD 30 MIN: CPT

## 2023-01-24 NOTE — CONSULT LETTER
[Dear  ___] : Dear  [unfilled], [Courtesy Letter:] : I had the pleasure of seeing your patient, [unfilled], in my office today. [Please see my note below.] : Please see my note below. [Sincerely,] : Sincerely, [FreeTextEntry2] : Dr. Milton Meek (Surg/Ref)\par Dr. Seymour (Endo)\par Dr. Najma Fontaine (PCP) [FreeTextEntry3] : \par \par \par Ayad Cordova MD, FACS \par Chief, Division of Thoracic Surgery \par Director, Minimally Invasive Thoracic Surgery \par Department of Cardiovascular and Thoracic Surgery \par [facility] \par , Cardiovascular and Thoracic Surgery

## 2023-01-24 NOTE — HISTORY OF PRESENT ILLNESS
[FreeTextEntry1] : Ms. YVONNE LUJAN, 67 year old female, Currently vapes, Former smoker (1/2 PPD x 15 years; Quit 2016), w/ hx of HTN, Stroke, Afib (Eliquis) Hypothyroidism,  s/p partial right thyroidectomy and parathyroid removal by Dr Shankar in 1981.  Now referred by Dr. Meek\par \par CT 4D Parathyroid with IV contrast on 12/13/22: \par - There is a focus of enhancement to the right of the trachea at the level of the aortic arch (6:69, 604:70, 605:59) which is partially obscured by respiratory and streak artifact measuring 1.5 x 1 x 0.9 cm. The lesion appears hypodense before contrast with delayed washout and is suspicious for an ectopic parathyroid adenoma.\par - Enlarged and heterogeneous in appearance with scattered thyroid nodules. There is no significant tracheal narrowing. The left lobe extends to but not below the level of the sternum.\par -  A metallic density is incompletely seen within the anterior chest wall on the left which appears to represent a loop recorder by patient history.\par \par Parathyroid imaging w/ Spect CT on 1/5/23: \par - No evidence of a parathyroid lesion in the neck or mediastinum\par \par 12/2/22: Calcium: 11.2 (8.4- 10.5); Intact PTH: 69 (15-65)\par \par CT Chest w/IVC on 1/16/23:\par - In comparison with 12/13/2022, stable 1.5 x 0.9 cm right lower paratracheal nodule.\par - No lung nodule\par \par Patient is here today for a follow up to discuss requested CT IVC.  Continues to report progressive muscle weakness that is unchanged since prior office visit. No muscle twitching. She denies any fever, chills, cough, shortness of breath, chest pain, hemoptysis, or recent illness.

## 2023-01-24 NOTE — ASSESSMENT
[FreeTextEntry1] : Ms. YVONNE LUJAN, 67 year old female, Currently vapes, Former smoker (1/2 PPD x 15 years; Quit 2016), w/ hx of HTN, Stroke, Afib (Eliquis) Hypothyroidism,  s/p partial right thyroidectomy and parathyroid removal by Dr Shankar in 1981. Referred by Dr. Meek with imaging concerning for possible ectopic parathyroid adenoma.  Recent labs 12/2/2022: Calcium: 11.2 (8.4- 10.5); Intact PTH: 69 (15-65).  Here today to review repeat CT Chest with IV Contrast.\par \par I have independently reviewed the medical records and imaging at the time of this office consultation, and discussed the following interpretations and recommendations with the patient:\par - Discussed with Dr. Meek, no clear target, mediastinal lymphadenectomy could be considered.  Will follow up with Dr. Meek for further management/workup.\par - No further thoracic surgery visits are required at this time, however, she may return to the office as needed if any issues arise.\par Recommendations reviewed with patient during this office visit, and all questions answered; Patient instructed on the importance of follow up and verbalizes understanding.\par \par \par I, GANGA Espitia, personally performed the evaluation and management (E/M) services for this established patient who presents today with (a) new problem(s)/exacerbation of (an) existing condition(s). That E/M includes conducting the examination, assessing all new/exacerbated conditions, and establishing a new plan of care. Today, my ACP, Skylar Granados NP was here to observe my evaluation and management services for this new problem/exacerbated condition to be followed going forward.

## 2023-01-24 NOTE — PHYSICAL EXAM
[Sclera] : the sclera and conjunctiva were normal [PERRL With Normal Accommodation] : pupils were equal in size, round, and reactive to light [Neck Appearance] : the appearance of the neck was normal [] : no respiratory distress [Respiration, Rhythm And Depth] : normal respiratory rhythm and effort [Auscultation Breath Sounds / Voice Sounds] : lungs were clear to auscultation bilaterally [Heart Sounds] : normal S1 and S2 [Murmurs] : no murmurs [Examination Of The Chest] : the chest was normal in appearance [Bowel Sounds] : normal bowel sounds [Abdomen Soft] : soft [Cervical Lymph Nodes Enlarged Posterior Bilaterally] : posterior cervical [Cervical Lymph Nodes Enlarged Anterior Bilaterally] : anterior cervical [Supraclavicular Lymph Nodes Enlarged Bilaterally] : supraclavicular [Abnormal Walk] : normal gait [Skin Color & Pigmentation] : normal skin color and pigmentation [Skin Turgor] : normal skin turgor [No Focal Deficits] : no focal deficits [Oriented To Time, Place, And Person] : oriented to person, place, and time [Affect] : the affect was normal [Mood] : the mood was normal

## 2023-01-31 ENCOUNTER — APPOINTMENT (OUTPATIENT)
Dept: SURGERY | Facility: CLINIC | Age: 68
End: 2023-01-31
Payer: COMMERCIAL

## 2023-01-31 PROCEDURE — 36415 COLL VENOUS BLD VENIPUNCTURE: CPT

## 2023-01-31 PROCEDURE — 99214 OFFICE O/P EST MOD 30 MIN: CPT | Mod: 25

## 2023-01-31 NOTE — PHYSICAL EXAM
[de-identified] : well healed scar [de-identified] : 3 cm left thyroid nodule, well circumscribed and mobile [Nasal Endoscopy Performed] : nasal endoscopy was performed, see procedure section for findings [Laryngoscopy Performed] : laryngoscopy was performed, see procedure section for findings [Midline] : located in midline position [Normal] : orientation to person, place, and time: normal

## 2023-01-31 NOTE — ASSESSMENT
[FreeTextEntry1] : lengthy discussion regarding options for management including mediastinal dissection by dr Cordova vs thyroidectomy vs medication.  reluctant to recommend any surgical intervention in view of poor localization and need to suspend blood thinners being used to treat AF.  bloods drawn. to call next week for results. will d/w dr montes. patient has been given the opportunity to ask questions, and all of the patient's questions have been answered to their satisfaction

## 2023-01-31 NOTE — HISTORY OF PRESENT ILLNESS
[de-identified] : prior evaluation of MNG and recurrent hyperparathyroidism.  4D CT suggested mediastinal parathyroid, not verified on SPECT scan or further chest imaging.  history of heartburn and occasional choking symptoms but no other symptoms. no changes medically since last visit. I have reviewed all old and new data and available images.

## 2023-02-01 ENCOUNTER — NON-APPOINTMENT (OUTPATIENT)
Age: 68
End: 2023-02-01

## 2023-02-01 LAB
25(OH)D3 SERPL-MCNC: 40.7 NG/ML
CALCIUM SERPL-MCNC: 10.5 MG/DL
CALCIUM SERPL-MCNC: 10.5 MG/DL
PARATHYROID HORMONE INTACT: 63 PG/ML
T3 SERPL-MCNC: 103 NG/DL
T4 FREE SERPL-MCNC: 1.2 NG/DL
TSH SERPL-ACNC: 0.38 UIU/ML

## 2023-02-06 LAB
THYROGLOB AB SERPL-ACNC: <20 IU/ML
THYROPEROXIDASE AB SERPL IA-ACNC: <10 IU/ML

## 2023-02-08 ENCOUNTER — NON-APPOINTMENT (OUTPATIENT)
Age: 68
End: 2023-02-08

## 2023-03-09 NOTE — PATIENT PROFILE ADULT. - FALL HARM RISK TYPE OF ASSESSMENT
Continue Prilosec. Use over-the-counter Mylanta  E prescription for Compazine and Zofran ODT  General surg referral for placement of a port  Schedule chemo teaching regarding every 3-week Tecentriq and bevacizumab  Plan entered today and plan to start approximately 2 weeks 3/22/2023. Does not need to see provider on 3/22.  Zena Barclay with me 4/12 for second course of above treatment  Additional labs today include hepatitis B and C serologies.
Admission

## 2023-03-14 ENCOUNTER — NON-APPOINTMENT (OUTPATIENT)
Age: 68
End: 2023-03-14

## 2023-03-14 ENCOUNTER — APPOINTMENT (OUTPATIENT)
Dept: ELECTROPHYSIOLOGY | Facility: CLINIC | Age: 68
End: 2023-03-14
Payer: COMMERCIAL

## 2023-03-14 VITALS
OXYGEN SATURATION: 97 % | HEIGHT: 65 IN | SYSTOLIC BLOOD PRESSURE: 131 MMHG | BODY MASS INDEX: 29.82 KG/M2 | WEIGHT: 179 LBS | DIASTOLIC BLOOD PRESSURE: 63 MMHG | HEART RATE: 63 BPM

## 2023-03-14 PROCEDURE — 99213 OFFICE O/P EST LOW 20 MIN: CPT

## 2023-03-14 PROCEDURE — 93285 PRGRMG DEV EVAL SCRMS IP: CPT

## 2023-03-14 RX ORDER — B12/LEVOMEFOLATE CALCIUM/B-6 2-1.13-25
1.13-25-2 TABLET ORAL
Qty: 90 | Refills: 0 | Status: DISCONTINUED | COMMUNITY
Start: 2021-02-12 | End: 2023-03-14

## 2023-03-14 RX ORDER — ERGOCALCIFEROL 1.25 MG/1
1.25 MG CAPSULE, LIQUID FILLED ORAL
Qty: 30 | Refills: 3 | Status: ACTIVE | COMMUNITY
Start: 2017-02-25

## 2023-03-14 RX ORDER — IPRATROPIUM BROMIDE 42 UG/1
0.06 SPRAY NASAL
Qty: 15 | Refills: 0 | Status: DISCONTINUED | COMMUNITY
Start: 2022-10-18 | End: 2023-03-14

## 2023-03-14 RX ORDER — LOSARTAN POTASSIUM 50 MG/1
50 TABLET, FILM COATED ORAL DAILY
Qty: 90 | Refills: 3 | Status: ACTIVE | COMMUNITY

## 2023-03-14 RX ORDER — FOLIC ACID-PYRIDOXINE-CYANOCOBALAMIN TAB 2.5-25-2 MG 2.5-25-2 MG
TAB ORAL
Refills: 0 | Status: DISCONTINUED | COMMUNITY
End: 2023-03-14

## 2023-03-14 RX ORDER — METOPROLOL TARTRATE 25 MG/1
25 TABLET, FILM COATED ORAL
Qty: 540 | Refills: 0 | Status: DISCONTINUED | COMMUNITY
Start: 2021-12-03 | End: 2023-03-14

## 2023-03-14 RX ORDER — AMOXICILLIN 500 MG/1
500 CAPSULE ORAL
Qty: 12 | Refills: 0 | Status: DISCONTINUED | COMMUNITY
Start: 2020-10-30 | End: 2023-03-14

## 2023-03-14 RX ORDER — SODIUM PICOSULFATE, MAGNESIUM OXIDE, AND ANHYDROUS CITRIC ACID 10; 3.5; 12 MG/160ML; G/160ML; G/160ML
10-3.5-12 MG-GM LIQUID ORAL
Qty: 320 | Refills: 0 | Status: DISCONTINUED | COMMUNITY
Start: 2022-09-22 | End: 2023-03-14

## 2023-03-14 NOTE — DISCUSSION/SUMMARY
[FreeTextEntry1] : Patient is doing well on Multaq with no recurrent AF. SHe is tolerating the Multaq and her ILR shows no AF episodes. ILR has reached end of service.  We discussed the options of loop explant versus abandoning of the device.  We discussed the use of "wearables" as nonlooping event monitors.  We will continue our current therapy with Multaq and Eliquis.  She is concerned about her ability to pay for Multaq when her  retires and her insurance changes.

## 2023-03-14 NOTE — CARDIOLOGY SUMMARY
[___] : [unfilled] [de-identified] : 10/6/2022\par Normal LV systolic function. \par Normal RV size and function\par Closure device seen in the LA\par RA is nomal\par Moderate MR.  \par Mild pulmonary hypertension

## 2023-04-12 ENCOUNTER — NON-APPOINTMENT (OUTPATIENT)
Age: 68
End: 2023-04-12

## 2023-04-24 ENCOUNTER — OUTPATIENT (OUTPATIENT)
Dept: OUTPATIENT SERVICES | Facility: HOSPITAL | Age: 68
LOS: 1 days | End: 2023-04-24
Payer: COMMERCIAL

## 2023-04-24 ENCOUNTER — TRANSCRIPTION ENCOUNTER (OUTPATIENT)
Age: 68
End: 2023-04-24

## 2023-04-24 VITALS
OXYGEN SATURATION: 99 % | RESPIRATION RATE: 18 BRPM | DIASTOLIC BLOOD PRESSURE: 55 MMHG | HEART RATE: 54 BPM | SYSTOLIC BLOOD PRESSURE: 117 MMHG

## 2023-04-24 VITALS
RESPIRATION RATE: 16 BRPM | TEMPERATURE: 98 F | SYSTOLIC BLOOD PRESSURE: 133 MMHG | DIASTOLIC BLOOD PRESSURE: 75 MMHG | OXYGEN SATURATION: 100 % | HEART RATE: 64 BPM

## 2023-04-24 DIAGNOSIS — Z98.1 ARTHRODESIS STATUS: Chronic | ICD-10-CM

## 2023-04-24 DIAGNOSIS — Z45.010 ENCOUNTER FOR CHECKING AND TESTING OF CARDIAC PACEMAKER PULSE GENERATOR [BATTERY]: ICD-10-CM

## 2023-04-24 DIAGNOSIS — Z98.890 OTHER SPECIFIED POSTPROCEDURAL STATES: Chronic | ICD-10-CM

## 2023-04-24 DIAGNOSIS — Z90.710 ACQUIRED ABSENCE OF BOTH CERVIX AND UTERUS: Chronic | ICD-10-CM

## 2023-04-24 DIAGNOSIS — Z90.49 ACQUIRED ABSENCE OF OTHER SPECIFIED PARTS OF DIGESTIVE TRACT: Chronic | ICD-10-CM

## 2023-04-24 PROCEDURE — 33286 RMVL SUBQ CAR RHYTHM MNTR: CPT

## 2023-04-24 RX ORDER — ATORVASTATIN CALCIUM 80 MG/1
1 TABLET, FILM COATED ORAL
Qty: 0 | Refills: 0 | DISCHARGE

## 2023-04-24 RX ORDER — ASPIRIN/CALCIUM CARB/MAGNESIUM 324 MG
1 TABLET ORAL
Qty: 0 | Refills: 0 | DISCHARGE

## 2023-04-24 RX ORDER — FUROSEMIDE 40 MG
1 TABLET ORAL
Qty: 0 | Refills: 0 | DISCHARGE

## 2023-04-24 RX ORDER — HYDROCORTISONE 1 %
1 OINTMENT (GRAM) TOPICAL
Qty: 0 | Refills: 0 | DISCHARGE

## 2023-04-24 RX ORDER — CHOLECALCIFEROL (VITAMIN D3) 125 MCG
1 CAPSULE ORAL
Refills: 0 | DISCHARGE

## 2023-04-24 RX ORDER — APIXABAN 2.5 MG/1
1 TABLET, FILM COATED ORAL
Qty: 60 | Refills: 0
Start: 2023-04-24 | End: 2023-05-23

## 2023-04-24 RX ORDER — ALLOPURINOL 300 MG
1 TABLET ORAL
Qty: 0 | Refills: 0 | DISCHARGE

## 2023-04-24 RX ORDER — DOCUSATE SODIUM 100 MG
1 CAPSULE ORAL
Refills: 0 | DISCHARGE

## 2023-04-24 RX ORDER — DOCUSATE SODIUM 100 MG
1 CAPSULE ORAL
Qty: 0 | Refills: 0 | DISCHARGE

## 2023-04-24 RX ORDER — ACETAMINOPHEN 500 MG
1 TABLET ORAL
Refills: 0 | DISCHARGE

## 2023-04-24 RX ORDER — UBIDECARENONE 100 MG
1 CAPSULE ORAL
Refills: 0 | DISCHARGE

## 2023-04-24 RX ORDER — DRONEDARONE 400 MG/1
1 TABLET, FILM COATED ORAL
Refills: 0 | DISCHARGE

## 2023-04-24 RX ORDER — METOPROLOL TARTRATE 50 MG
1.5 TABLET ORAL
Refills: 0 | DISCHARGE

## 2023-04-24 RX ORDER — LOSARTAN POTASSIUM 100 MG/1
1 TABLET, FILM COATED ORAL
Refills: 0 | DISCHARGE

## 2023-04-24 RX ORDER — UBIDECARENONE 100 MG
1 CAPSULE ORAL
Qty: 0 | Refills: 0 | DISCHARGE

## 2023-04-24 RX ORDER — ERGOCALCIFEROL 1.25 MG/1
1 CAPSULE ORAL
Qty: 0 | Refills: 0 | DISCHARGE

## 2023-04-24 NOTE — H&P CARDIOLOGY - HISTORY OF PRESENT ILLNESS
67 year old female with significant PMHx of OA, Gout, CVA, HTN, CAD, AF S/P cardioversion by Dr. Moreno at [facility], Loop recorder now presents for Loop Explant  67 year old female with significant PMHx of OA, Gout, CVA, HTN, CAD, AF S/P cardioversion by Dr. Moreno at [facility], Loop recorder now presents for Loop Explant. Patient is tolerating the Multaq and her ILR shows no AF episodes. ILR has reached end of service and presents today for explant.

## 2023-04-24 NOTE — ASU DISCHARGE PLAN (ADULT/PEDIATRIC) - NS MD DC FALL RISK RISK
For information on Fall & Injury Prevention, visit: [email or web address] OR  [email or web address] OR  https://www.cdc.gov/steadi/patient.html

## 2023-04-24 NOTE — ASU DISCHARGE PLAN (ADULT/PEDIATRIC) - PROVIDER TOKENS
PROVIDER:[TOKEN:[[id number]],SCHEDULEDAPPT:[05/04/2023],SCHEDULEDAPPTTIME:[08:40 AM],ESTABLISHEDPATIENT:[T]]

## 2023-04-24 NOTE — ASU DISCHARGE PLAN (ADULT/PEDIATRIC) - CARE PROVIDER_API CALL
Thad Moreno)  Cardiac Electrophysiology; Cardiology  [address] [place] [address]  Phone: [phone]  Fax: [phone]  Established Patient  Scheduled Appointment: 05/04/2023 08:40 AM

## 2023-05-04 ENCOUNTER — APPOINTMENT (OUTPATIENT)
Dept: ELECTROPHYSIOLOGY | Facility: CLINIC | Age: 68
End: 2023-05-04
Payer: COMMERCIAL

## 2023-05-04 ENCOUNTER — NON-APPOINTMENT (OUTPATIENT)
Age: 68
End: 2023-05-04

## 2023-05-04 VITALS — DIASTOLIC BLOOD PRESSURE: 82 MMHG | OXYGEN SATURATION: 98 % | HEART RATE: 58 BPM | SYSTOLIC BLOOD PRESSURE: 154 MMHG

## 2023-05-04 DIAGNOSIS — I63.9 CEREBRAL INFARCTION, UNSPECIFIED: ICD-10-CM

## 2023-05-04 PROCEDURE — 99212 OFFICE O/P EST SF 10 MIN: CPT | Mod: 25

## 2023-05-04 PROCEDURE — 93000 ELECTROCARDIOGRAM COMPLETE: CPT

## 2023-05-04 NOTE — REVIEW OF SYSTEMS
[Fever] : no fever [Headache] : no headache [Chills] : no chills [Feeling Fatigued] : not feeling fatigued [Blurry Vision] : no blurred vision [Earache] : no earache [Discharge From Ears] : no discharge from the ears [Hearing Loss] : no hearing loss [Sore Throat] : no sore throat [Sinus Pressure] : no sinus pressure [SOB] : no shortness of breath [Dyspnea on exertion] : not dyspnea during exertion [Chest Discomfort] : no chest discomfort [Leg Claudication] : no intermittent leg claudication [Palpitations] : no palpitations [Syncope] : no syncope [Cough] : no cough [Abdominal Pain] : no abdominal pain [Nausea] : no nausea [Change in Appetite] : no change in appetite [Change In The Stool] : no change in stool [Constipation] : no constipation [Dysuria] : no dysuria [Joint Pain] : no joint pain [Myalgia] : no myalgia [Rash] : no rash [Itching] : no itching [Skin Lesions] : no skin lesions [Dizziness] : no dizziness [Tremor] : no tremor was seen [Convulsions] : no convulsions [Tingling (Paresthesia)] : no tingling [Limb Weakness (Paresis)] : no limb weakness (Paresis) [Confusion] : no confusion was observed [Under Stress] : not under stress [Easy Bleeding] : no tendency for easy bleeding [de-identified] : Left sternal border ILR explant surgical incision site

## 2023-05-04 NOTE — DISCUSSION/SUMMARY
[EKG obtained to assist in diagnosis and management of assessed problem(s)] : EKG obtained to assist in diagnosis and management of assessed problem(s) [FreeTextEntry1] : Ms. Garcia has been feeling well s/p recent ILR explant on 4/24/2023.  Left sternal border surgical incision site is healing well and well approximated, without erythema, swelling, or drainage.  She is looking into obtaining a [employer] device.  She will continue to follow with her cardiologist moving forward.

## 2023-05-04 NOTE — PHYSICAL EXAM
[Well Developed] : well developed [Well Nourished] : well nourished [No Acute Distress] : no acute distress [Normal S1, S2] : normal S1, S2 [Clear Lung Fields] : clear lung fields [Good Air Entry] : good air entry [Soft] : abdomen soft [Non Tender] : non-tender [Normal Gait] : normal gait [No Edema] : no edema [No Rash] : no rash [Moves all extremities] : moves all extremities [Alert and Oriented] : alert and oriented [de-identified] : Left sternal border surgical incision is healing well and well approximated, without erythema, swelling, or drainage.

## 2023-05-04 NOTE — HISTORY OF PRESENT ILLNESS
[FreeTextEntry1] : Ms. Garcia is a 67-year-old female with a history of AF and was cardioverted in the past. She developed constipation with flecainide and now maintained on Multaq.  She had an ILR implanted 3/11/2019, and recently underwent ILR explant with Dr. Moreno on 4/24/2023.  She presents today for a wound evaluation.  She is feeling well and denies any chest pain, shortness of breath, near syncope, or syncope.  \par

## 2023-05-04 NOTE — CARDIOLOGY SUMMARY
[de-identified] : 5/4/2023: Sinus Bradycardia @ 59bpm [de-identified] : 10/6/2022\par Normal LV systolic function. \par Normal RV size and function\par Closure device seen in the LA\par RA is nomal\par Moderate MR. \par Mild pulmonary hypertension  [de-identified] : 8/23/2018, 14MM SEPTAL OCCLUDER closure devicewas advanced across the defect through the sheath, and deployed to closethe defect. The final deployed position was satisfactory. There was atrivial residual shunt by color-flow imaging. Intracardiac Echocardiogram.

## 2023-05-10 NOTE — ED PROVIDER NOTE - CROS ED CONS ALL NEG
Merlin Swift is a 59 year old male presenting to the walk-in clinic today for pruritic lesions along bilateral dorsal feet and bilateral posterior thighs x1 weeks; denies any fever or local discharge.       Swabs/Specimens collected during rooming process:    None       Patient would like communication of their results via:     [facility]   negative...

## 2023-05-30 ENCOUNTER — APPOINTMENT (OUTPATIENT)
Dept: SURGERY | Facility: CLINIC | Age: 68
End: 2023-05-30
Payer: COMMERCIAL

## 2023-05-30 PROCEDURE — 36415 COLL VENOUS BLD VENIPUNCTURE: CPT

## 2023-05-30 PROCEDURE — 99214 OFFICE O/P EST MOD 30 MIN: CPT

## 2023-05-30 RX ORDER — FAMOTIDINE 20 MG/1
20 TABLET, FILM COATED ORAL
Qty: 60 | Refills: 0 | Status: ACTIVE | COMMUNITY
Start: 2023-05-17

## 2023-05-30 NOTE — PHYSICAL EXAM
[de-identified] : well healed scar [de-identified] : 3 cm left thyroid nodule, well circumscribed and mobile [Nasal Endoscopy Performed] : nasal endoscopy was performed, see procedure section for findings [Laryngoscopy Performed] : laryngoscopy was performed, see procedure section for findings [Midline] : located in midline position [Normal] : orientation to person, place, and time: normal

## 2023-05-30 NOTE — ASSESSMENT
[FreeTextEntry1] : will observe. bloods drawn. sonogram requested.   reluctant to recommend any surgical intervention in view of poor localization and need to suspend blood thinners being used to treat AF.  to call next week for results. patient has been given the opportunity to ask questions, and all of the patient's questions have been answered to their satisfaction

## 2023-05-30 NOTE — HISTORY OF PRESENT ILLNESS
[de-identified] : prior evaluation of MNG and recurrent hyperparathyroidism.  4D CT suggested mediastinal parathyroid, not verified on SPECT scan or further chest imaging.  history of fatigue  and occasional choking symptoms but no other symptoms. no changes medically since last visit. I have reviewed all old and new data and available images.  Additional information was obtained from others present at the time of visit to ensure the completeness of the history\par

## 2023-05-31 LAB
25(OH)D3 SERPL-MCNC: 53.6 NG/ML
CALCIUM SERPL-MCNC: 10.5 MG/DL
CALCIUM SERPL-MCNC: 10.5 MG/DL
PARATHYROID HORMONE INTACT: 64 PG/ML
T3 SERPL-MCNC: 100 NG/DL
T4 FREE SERPL-MCNC: 1.3 NG/DL
THYROGLOB AB SERPL-ACNC: <20 IU/ML
THYROPEROXIDASE AB SERPL IA-ACNC: <10 IU/ML
TSH SERPL-ACNC: 0.32 UIU/ML

## 2023-06-09 ENCOUNTER — NON-APPOINTMENT (OUTPATIENT)
Age: 68
End: 2023-06-09

## 2023-06-26 NOTE — ED ADULT NURSE NOTE - NSFALLRSKINDICATORS_ED_ALL_ED
Family Psychotherapy (MD)    6/26/2023    The patient location is: home  The chief complaint leading to consultation is: follow-up    Visit type: audiovisual    Face to Face time with patient: 16 minutes  31 minutes of total time spent on the encounter, which includes face to face time and non-face to face time preparing to see the patient (eg, review of tests), Obtaining and/or reviewing separately obtained history, Documenting clinical information in the electronic or other health record, Independently interpreting results (not separately reported) and communicating results to the patient/family/caregiver, or Care coordination (not separately reported).         Each patient to whom he or she provides medical services by telemedicine is:  (1) informed of the relationship between the physician and patient and the respective role of any other health care provider with respect to management of the patient; and (2) notified that he or she may decline to receive medical services by telemedicine and may withdraw from such care at any time.      Site: Tele[facility]    Length of service: 30    Therapeutic intervention: 90126-Family therapy without patient; needed because pt was in hospital    Persons present: mother     Interval history: Pt was taken to ED on 6/20/23 after giving herself excessive insulin in suicide attempt. Prior to this event pt had been sneaking her boyfriend into mom's house; pt had intercourse with him for the first time and mom found out. Mom had contacted police about him trespassing on her property and had forbidden patient to see him.    Pt is currently inpatient at [facility]; mom is attempting to get her in a PRTF.    Target symptoms: depression, anxiety      Patient's interpersonal/verbal exchanges: 81916-Family therapy without patient: patient not present    Progress toward goals: not progressing    Diagnosis: PTSD, MDD    Plan: individual psychotherapy  medication management by physician    Return  to clinic:  after discharge from [facility]     no

## 2023-08-01 ENCOUNTER — RX RENEWAL (OUTPATIENT)
Age: 68
End: 2023-08-01

## 2023-08-01 RX ORDER — DRONEDARONE 400 MG/1
400 TABLET, FILM COATED ORAL
Qty: 180 | Refills: 3 | Status: ACTIVE | COMMUNITY
Start: 2020-10-13 | End: 1900-01-01

## 2023-09-26 ENCOUNTER — NON-APPOINTMENT (OUTPATIENT)
Age: 68
End: 2023-09-26

## 2023-09-26 ENCOUNTER — APPOINTMENT (OUTPATIENT)
Dept: ELECTROPHYSIOLOGY | Facility: CLINIC | Age: 68
End: 2023-09-26
Payer: COMMERCIAL

## 2023-09-26 VITALS
WEIGHT: 185 LBS | SYSTOLIC BLOOD PRESSURE: 131 MMHG | RESPIRATION RATE: 14 BRPM | OXYGEN SATURATION: 97 % | HEART RATE: 61 BPM | HEIGHT: 65 IN | DIASTOLIC BLOOD PRESSURE: 76 MMHG | BODY MASS INDEX: 30.82 KG/M2

## 2023-09-26 DIAGNOSIS — I48.91 UNSPECIFIED ATRIAL FIBRILLATION: ICD-10-CM

## 2023-09-26 PROCEDURE — 99213 OFFICE O/P EST LOW 20 MIN: CPT | Mod: 25

## 2023-09-26 PROCEDURE — 93000 ELECTROCARDIOGRAM COMPLETE: CPT

## 2023-09-26 RX ORDER — HYDROCORTISONE AND ACETIC ACID OTIC 20.75; 10.375 MG/ML; MG/ML
1-2 SOLUTION AURICULAR (OTIC)
Qty: 10 | Refills: 0 | Status: DISCONTINUED | COMMUNITY
Start: 2023-03-16 | End: 2023-09-26

## 2023-09-27 PROBLEM — I48.91 ATRIAL FIBRILLATION: Status: ACTIVE | Noted: 2020-02-11

## 2023-10-28 ENCOUNTER — NON-APPOINTMENT (OUTPATIENT)
Age: 68
End: 2023-10-28

## 2023-11-28 ENCOUNTER — APPOINTMENT (OUTPATIENT)
Dept: SURGERY | Facility: CLINIC | Age: 68
End: 2023-11-28
Payer: COMMERCIAL

## 2023-11-28 PROCEDURE — 36415 COLL VENOUS BLD VENIPUNCTURE: CPT

## 2023-11-28 PROCEDURE — 99214 OFFICE O/P EST MOD 30 MIN: CPT

## 2023-11-29 LAB
25(OH)D3 SERPL-MCNC: 42.3 NG/ML
CALCIUM SERPL-MCNC: 10.6 MG/DL
CALCIUM SERPL-MCNC: 10.6 MG/DL
PARATHYROID HORMONE INTACT: 74 PG/ML
T3 SERPL-MCNC: 107 NG/DL
T4 FREE SERPL-MCNC: 1.2 NG/DL
THYROGLOB AB SERPL-ACNC: <20 IU/ML
THYROPEROXIDASE AB SERPL IA-ACNC: <10 IU/ML
TSH SERPL-ACNC: 0.5 UIU/ML

## 2023-12-09 ENCOUNTER — RESULT REVIEW (OUTPATIENT)
Age: 68
End: 2023-12-09

## 2023-12-18 ENCOUNTER — NON-APPOINTMENT (OUTPATIENT)
Age: 68
End: 2023-12-18

## 2023-12-29 ENCOUNTER — APPOINTMENT (OUTPATIENT)
Dept: CT IMAGING | Facility: IMAGING CENTER | Age: 68
End: 2023-12-29
Payer: COMMERCIAL

## 2023-12-29 ENCOUNTER — OUTPATIENT (OUTPATIENT)
Dept: OUTPATIENT SERVICES | Facility: HOSPITAL | Age: 68
LOS: 1 days | End: 2023-12-29
Payer: COMMERCIAL

## 2023-12-29 DIAGNOSIS — Z90.710 ACQUIRED ABSENCE OF BOTH CERVIX AND UTERUS: Chronic | ICD-10-CM

## 2023-12-29 DIAGNOSIS — Z98.890 OTHER SPECIFIED POSTPROCEDURAL STATES: Chronic | ICD-10-CM

## 2023-12-29 DIAGNOSIS — Z98.1 ARTHRODESIS STATUS: Chronic | ICD-10-CM

## 2023-12-29 DIAGNOSIS — E04.1 NONTOXIC SINGLE THYROID NODULE: ICD-10-CM

## 2023-12-29 DIAGNOSIS — Z90.49 ACQUIRED ABSENCE OF OTHER SPECIFIED PARTS OF DIGESTIVE TRACT: Chronic | ICD-10-CM

## 2023-12-29 PROCEDURE — 70491 CT SOFT TISSUE NECK W/DYE: CPT | Mod: 26,MH

## 2023-12-29 PROCEDURE — 70491 CT SOFT TISSUE NECK W/DYE: CPT

## 2024-01-03 ENCOUNTER — NON-APPOINTMENT (OUTPATIENT)
Age: 69
End: 2024-01-03

## 2024-05-12 NOTE — PATIENT PROFILE ADULT - NSPROSPIRITUALVALUESFT_GEN_A_NUR
Patient Seen in: ward Emergency Department In [place]      History     Chief Complaint   Patient presents with    Ear Problem Pain     Stated Complaint: ear pain    Subjective:   HPI    Patient is a 9-year-old female presents emergency department with right ear pain.  Woke her up and could not sleep and was a 10 out of 10 pain and crying.  Also has been running low-grade fever.  She was just diagnosed with strep pharyngitis and started on antibiotics on Wednesday.  She states she has had some sinus congestion and stuffy nose.  Patient was given Motrin and Tylenol in triage and states had significant improvement in pain and rates it a 2 out of 10 currently    Objective:   History reviewed. No pertinent past medical history.           History reviewed. No pertinent surgical history.             Social History     Socioeconomic History    Marital status: Single   Tobacco Use    Smoking status: Never    Smokeless tobacco: Never   Vaping Use    Vaping status: Never Used   Substance and Sexual Activity    Alcohol use: Never    Drug use: Never     Social Determinants of Health      Received from [facility]    Housing Stability              Review of Systems    Positive for stated complaint: ear pain  Other systems are as noted in HPI.  Constitutional and vital signs reviewed.      All other systems reviewed and negative except as noted above.    Physical Exam     ED Triage Vitals [05/12/24 0019]   BP (!) 122/76   Pulse 86   Resp 20   Temp 99.1 °F (37.3 °C)   Temp src Temporal   SpO2 99 %   O2 Device None (Room air)       Current Vitals:   Vital Signs  BP: (!) 122/76  Pulse: 86  Resp: 20  Temp: 99.1 °F (37.3 °C)  Temp src: Temporal    Oxygen Therapy  SpO2: 99 %  O2 Device: None (Room air)            Physical Exam      General: Patient is appropriate appears well hydrated no signs of sepsis or dehydration at this time  Vital signs are stable, afebrile  HEENT: Pupils are equal and reactive to light  extraocular muscles intact there is no scleral icterus, there is no erythema or exudate in posterior pharynx, tympanic membrane is erythematous and bulging.  No perforation left TM is clear there is no anterior chain lymphadenopathy, clear rhinorrhea noted  Neck: Supple no JVD trachea is midline no meningismus  CV: Regular rate and rhythm no murmur rub  Respiratory: Clear to auscultation good air exchange bilaterally there is no crackles or wheezes auscultated no accessory muscle use.  Abdomen: Soft nontender nondistended bowel sounds are present there is no rebound no guarding  Extremities: Moving all extremities well there is no clubbing cyanosis or edema no rash noted    ED Course   Labs Reviewed - No data to display       Patient is already on amoxicillin which would be my first-line drug for otitis media.  Do feel the congestion from the viral URI and strep is probably causing some fluid buildup behind the ear.  Would like dad to continue Tylenol and Motrin as this seems to have controlled the pain patient feels she can go home continue the antibiotics and take a decongestant tomorrow.  They agree with plan.  Told to return if worse follow-up with the pediatrician         MDM      Differential diagnosis reflecting the complexity of care include: Otitis media, strep pharyngitis, viral URI      Shared decision making was done by myself and patient and father, continue the antibiotics take decongestant Tylenol Motrin for pain.  Drink plenty of fluids return if worse    Patient was screened and evaluated during this visit.  As the treating physician attending to the patient, I determined within reasonable clinical confidence and prior to discharge, that an emergency medical condition was not or was no longer present.  There was no indication for further evaluation, treatment, or admission on an emergency basis.  Comprehensive verbal and written discharge and follow-up instructions were provided to help prevent  relapse or worsening.  Patient was instructed to follow-up with primary care provider for further evaluation treatment, return immediately to ER for worsening, concerning, new, or changing/persisting symptoms.  I discussed the case with the patient and they had no questions, complaints, or concerns.  Patient was comfortable going home.      Dictation Disclaimer Note:   To increase efficiency this document may have been prepared using voice recognition technology. Every effort has been made to correct any errors made during preparation of this note. However, if a word or phrase is confusing, or does not make sense, this is likely due to a recognition error within the program which was not discovered during editing. Please do not hesitate to contact to address any significant errors.    Note to Patient:   The 21st Century Cures Act makes medical notes like these available to patients in the interest of transparency. Please be advised this is a medical document. Medical documents are intended to carry relevant information, facts as evident, and the clinical opinion of the practitioner. The medical note is intended as peer to peer communication and may appear blunt or direct. It is written in medical language and may contain abbreviations or verbiage that are unfamiliar.                                      MDM    Disposition and Plan     Clinical Impression:  1. Non-recurrent acute suppurative otitis media of right ear without spontaneous rupture of tympanic membrane         Disposition:  Discharge  5/12/2024  2:13 am    Follow-up:  Jelani Hui MD  [address] [address]  [phone]    Follow up            Medications Prescribed:  Current Discharge Medication List                                          declines

## 2024-06-20 ENCOUNTER — APPOINTMENT (OUTPATIENT)
Dept: SURGERY | Facility: CLINIC | Age: 69
End: 2024-06-20
Payer: COMMERCIAL

## 2024-06-20 DIAGNOSIS — E04.1 NONTOXIC SINGLE THYROID NODULE: ICD-10-CM

## 2024-06-20 DIAGNOSIS — E21.3 HYPERPARATHYROIDISM, UNSPECIFIED: ICD-10-CM

## 2024-06-20 PROCEDURE — 99214 OFFICE O/P EST MOD 30 MIN: CPT

## 2024-06-20 NOTE — PHYSICAL EXAM
[de-identified] : well healed scar [de-identified] : 3 cm left thyroid nodule, well circumscribed and mobile [Laryngoscopy Performed] : laryngoscopy was performed, see procedure section for findings [Midline] : located in midline position [Normal] : orientation to person, place, and time: normal

## 2024-06-20 NOTE — ASSESSMENT
[FreeTextEntry1] : requested 4D MRI.  to call next week for results. patient has been given the opportunity to ask questions, and all of the patient's questions have been answered to their satisfaction. if requires surgery, will need nerve monitoring of extremities and recurrent nerves

## 2024-06-20 NOTE — HISTORY OF PRESENT ILLNESS
[de-identified] : prior evaluation of MNG and recurrent hyperparathyroidism.  4D CT suggested mediastinal parathyroid, not verified on SPECT scan or further chest imaging.  history of fatigue  and occasional choking symptoms and pressure sensation in neck when bending, but no other symptoms. no changes medically since last visit. I have reviewed all old and new data and available images.  s/p parathyroidectomy and excision thyroid nodule by dr castellanos years ago.  needs to have spine surgery but surgeon is concerned about non healing due to hyperparathyroidism.

## 2024-07-01 ENCOUNTER — APPOINTMENT (OUTPATIENT)
Dept: MRI IMAGING | Facility: CLINIC | Age: 69
End: 2024-07-01

## 2024-07-01 PROCEDURE — A9585: CPT | Mod: JW

## 2024-07-01 PROCEDURE — 70543 MRI ORBT/FAC/NCK W/O &W/DYE: CPT

## 2024-07-09 ENCOUNTER — APPOINTMENT (OUTPATIENT)
Dept: MRI IMAGING | Facility: CLINIC | Age: 69
End: 2024-07-09

## 2024-07-15 ENCOUNTER — NON-APPOINTMENT (OUTPATIENT)
Age: 69
End: 2024-07-15

## 2024-07-26 ENCOUNTER — RX RENEWAL (OUTPATIENT)
Age: 69
End: 2024-07-26

## 2024-07-26 ENCOUNTER — APPOINTMENT (OUTPATIENT)
Dept: MRI IMAGING | Facility: CLINIC | Age: 69
End: 2024-07-26

## 2024-08-20 ENCOUNTER — APPOINTMENT (OUTPATIENT)
Dept: MAMMOGRAPHY | Facility: IMAGING CENTER | Age: 69
End: 2024-08-20
Payer: COMMERCIAL

## 2024-08-20 ENCOUNTER — APPOINTMENT (OUTPATIENT)
Dept: ULTRASOUND IMAGING | Facility: IMAGING CENTER | Age: 69
End: 2024-08-20
Payer: COMMERCIAL

## 2024-08-20 ENCOUNTER — OUTPATIENT (OUTPATIENT)
Dept: OUTPATIENT SERVICES | Facility: HOSPITAL | Age: 69
LOS: 1 days | End: 2024-08-20
Payer: COMMERCIAL

## 2024-08-20 DIAGNOSIS — Z98.1 ARTHRODESIS STATUS: Chronic | ICD-10-CM

## 2024-08-20 DIAGNOSIS — Z98.890 OTHER SPECIFIED POSTPROCEDURAL STATES: Chronic | ICD-10-CM

## 2024-08-20 DIAGNOSIS — Z90.49 ACQUIRED ABSENCE OF OTHER SPECIFIED PARTS OF DIGESTIVE TRACT: Chronic | ICD-10-CM

## 2024-08-20 DIAGNOSIS — Z12.31 ENCOUNTER FOR SCREENING MAMMOGRAM FOR MALIGNANT NEOPLASM OF BREAST: ICD-10-CM

## 2024-08-20 DIAGNOSIS — Z90.710 ACQUIRED ABSENCE OF BOTH CERVIX AND UTERUS: Chronic | ICD-10-CM

## 2024-08-20 PROCEDURE — 77067 SCR MAMMO BI INCL CAD: CPT

## 2024-08-20 PROCEDURE — 76641 ULTRASOUND BREAST COMPLETE: CPT

## 2024-08-20 PROCEDURE — 77063 BREAST TOMOSYNTHESIS BI: CPT | Mod: 26

## 2024-08-20 PROCEDURE — 77063 BREAST TOMOSYNTHESIS BI: CPT

## 2024-08-20 PROCEDURE — 76641 ULTRASOUND BREAST COMPLETE: CPT | Mod: 26,50

## 2024-08-20 PROCEDURE — 77067 SCR MAMMO BI INCL CAD: CPT | Mod: 26

## 2024-09-05 ENCOUNTER — RX RENEWAL (OUTPATIENT)
Age: 69
End: 2024-09-05

## 2024-09-23 NOTE — ASU PATIENT PROFILE, ADULT - PATIENT KNOW
Spoke with Patient   Note: Patient was advised that he needs to get a letter from Dr. Del Cid's office indicating that he needs to extend his time off from work due to new orders for extended physical therapy.    Patient verbalized understanding all information presented and in agreement. All questions answered.    yes

## 2024-10-21 NOTE — H&P PST ADULT - NS HIV RISK FACTOR
Presented to [facility] with sodium 99.  Transferred to ICU [facility].  Etiology is hypovolemic hyponatremia - evelyne crawford  Nephrology following.  Did initially overcorrect and receive DDAVP and D5W.  Getting IV Lasix to help with volume status  Received a maximum dose of phenobarbital and 1 dose of Valium for agitation  Downgraded from ICU on 10/20 with sodium 127    Plan:  BMP q8h. If sodium If Na > 133, then call nephrology on-call   No

## 2024-10-22 ENCOUNTER — APPOINTMENT (OUTPATIENT)
Dept: ELECTROPHYSIOLOGY | Facility: CLINIC | Age: 69
End: 2024-10-22
Payer: COMMERCIAL

## 2024-10-22 ENCOUNTER — NON-APPOINTMENT (OUTPATIENT)
Age: 69
End: 2024-10-22

## 2024-10-22 VITALS
HEART RATE: 60 BPM | WEIGHT: 210 LBS | OXYGEN SATURATION: 98 % | SYSTOLIC BLOOD PRESSURE: 138 MMHG | BODY MASS INDEX: 34.95 KG/M2 | DIASTOLIC BLOOD PRESSURE: 79 MMHG

## 2024-10-22 DIAGNOSIS — I48.91 UNSPECIFIED ATRIAL FIBRILLATION: ICD-10-CM

## 2024-10-22 PROCEDURE — 99215 OFFICE O/P EST HI 40 MIN: CPT | Mod: 25

## 2024-10-22 PROCEDURE — 93000 ELECTROCARDIOGRAM COMPLETE: CPT

## 2024-11-01 ENCOUNTER — RX RENEWAL (OUTPATIENT)
Age: 69
End: 2024-11-01

## 2024-12-17 ENCOUNTER — NON-APPOINTMENT (OUTPATIENT)
Age: 69
End: 2024-12-17

## 2024-12-17 ENCOUNTER — APPOINTMENT (OUTPATIENT)
Dept: ELECTROPHYSIOLOGY | Facility: CLINIC | Age: 69
End: 2024-12-17
Payer: COMMERCIAL

## 2024-12-17 VITALS
SYSTOLIC BLOOD PRESSURE: 149 MMHG | HEART RATE: 67 BPM | DIASTOLIC BLOOD PRESSURE: 80 MMHG | RESPIRATION RATE: 14 BRPM | OXYGEN SATURATION: 96 % | HEIGHT: 64.5 IN | BODY MASS INDEX: 35.41 KG/M2 | WEIGHT: 210 LBS

## 2024-12-17 DIAGNOSIS — I48.91 UNSPECIFIED ATRIAL FIBRILLATION: ICD-10-CM

## 2024-12-17 PROCEDURE — 93000 ELECTROCARDIOGRAM COMPLETE: CPT

## 2024-12-17 PROCEDURE — 99215 OFFICE O/P EST HI 40 MIN: CPT | Mod: 25

## 2025-01-13 NOTE — ASU PATIENT PROFILE, ADULT - FALL HARM RISK - HARM RISK INTERVENTIONS

## 2025-01-13 NOTE — H&P ADULT - NSHPLABSRESULTS_GEN_ALL_CORE
1/14/25L EKG SR rate 68  1/7/25 SPECT-abnormal myocardial perfusion imaging with a medium sized area of moderate ischemia in the mid anterior and anterior lateral walls.

## 2025-01-13 NOTE — H&P ADULT - HISTORY OF PRESENT ILLNESS
69yr old female PMH HTN, afib, hyperthyroidism, mini stroke, central obesity, PFO closure . Pt. may need surgery for her large thyroid goiter and she has also been c/o back pain and weakness to her arm and legs and they require back surgery however no one wishes to perform surgery because she is on full anticoagulation. She needs to undergo a watchman procedure so she can safely get off Eliquis. Pt. had cardiac clearance for her surgeries and nuclear stress test was performed, which revealed abnormal myocardial perfusion imaging with a medium sized area of moderate ischemia in the mid anterior and anterior lateral walls. Pt. now referred for [facility] for further evaluation   69yr old female PMH HTN, AFIB on eliquis ( last dose 1/11/25), hyperthyroidism, CVA,  obesity, PFO closure . Patient reports progressively worsening SOB and fatigue that she was attributing to chronic back pain. Cardiac stress test done which revealed abnormal myocardial perfusion imaging with a medium sized area of moderate ischemia in the mid anterior and anterior lateral walls. Pt. now referred for [facility] for further evaluation

## 2025-01-13 NOTE — H&P ADULT - ASSESSMENT
69yr old female PMH HTN, afib, hyperthyroidism, mini stroke, central obesity, PFO closure . Pt. may need surgery for her large thyroid goiter and she has also been c/o back pain and weakness to her arm and legs and they require back surgery however no one wishes to perform surgery because she is on full anticoagulation. She needs to undergo a watchman procedure so she can safely get off Eliquis. Pt. had cardiac clearance for her surgeries and nuclear stress test was performed, which revealed abnormal myocardial perfusion imaging with a medium sized area of moderate ischemia in the mid anterior and anterior lateral walls. Pt. now referred for [facility] for further evaluation    Consent obtained for and signed for cardiac cath with coronary angiogram and possible stent placement with possible sedation and analgesia. LHC risks, benefits and alternatives discussed with patient. Risk discussed included, but not limited to MI, stroke, mortality, major bleeding, arrythmia, or infection, educational material provided. Pt. verbalizes and understands pre-procedural instructions.   ASA:  Bleeding Risk Score:  Creatinine:  GFR:    Ren Score  69yr old female PMH HTN, AFIB on eliquis ( last dose 1/11/25), hyperthyroidism, CVA,  obesity, PFO closure . Patient reports progressively worsening SOB and fatigue that she was attributing to chronic back pain. Cardiac stress test done which revealed abnormal myocardial perfusion imaging with a medium sized area of moderate ischemia in the mid anterior and anterior lateral walls. Pt. now referred for [facility] for further evaluation      ASA class: II  Creatinine: 0.95  GFR: 64  Bleeding  Risk score: 1.3  Ren Score: 1

## 2025-01-13 NOTE — H&P ADULT - PROBLEM SELECTOR PLAN 1
-plan for cardiac cath for ischemic work up  - LEYLA protocol pre hydration: NS 250cc IV bolus x1   -procedure of cardiac catheterization w/ coronary angiogram and possible stent placement, with possible sedation and analgia explained. Pt is competent, has capacity, and understands risks and benefits of procedure. Risks and benefits discussed. Risks include but not limited to bleeding, infection, allergy, renal failure requiring dialysis, stroke, and vascular injury.  All questions answered   - consent to be obtained from attending   - 1mg versed IVP x 1 for anxiety  now in holding room

## 2025-01-14 ENCOUNTER — TRANSCRIPTION ENCOUNTER (OUTPATIENT)
Age: 70
End: 2025-01-14

## 2025-01-14 ENCOUNTER — OUTPATIENT (OUTPATIENT)
Dept: OUTPATIENT SERVICES | Facility: HOSPITAL | Age: 70
LOS: 1 days | Discharge: HOME | End: 2025-01-14
Payer: COMMERCIAL

## 2025-01-14 VITALS
HEART RATE: 58 BPM | OXYGEN SATURATION: 98 % | TEMPERATURE: 98 F | DIASTOLIC BLOOD PRESSURE: 54 MMHG | RESPIRATION RATE: 20 BRPM | SYSTOLIC BLOOD PRESSURE: 127 MMHG

## 2025-01-14 VITALS
RESPIRATION RATE: 15 BRPM | OXYGEN SATURATION: 96 % | DIASTOLIC BLOOD PRESSURE: 58 MMHG | SYSTOLIC BLOOD PRESSURE: 100 MMHG | HEART RATE: 58 BPM

## 2025-01-14 DIAGNOSIS — Z90.710 ACQUIRED ABSENCE OF BOTH CERVIX AND UTERUS: Chronic | ICD-10-CM

## 2025-01-14 DIAGNOSIS — Z98.890 OTHER SPECIFIED POSTPROCEDURAL STATES: Chronic | ICD-10-CM

## 2025-01-14 DIAGNOSIS — Z90.49 ACQUIRED ABSENCE OF OTHER SPECIFIED PARTS OF DIGESTIVE TRACT: Chronic | ICD-10-CM

## 2025-01-14 DIAGNOSIS — R94.39 ABNORMAL RESULT OF OTHER CARDIOVASCULAR FUNCTION STUDY: ICD-10-CM

## 2025-01-14 DIAGNOSIS — Z98.1 ARTHRODESIS STATUS: Chronic | ICD-10-CM

## 2025-01-14 PROCEDURE — C1894: CPT

## 2025-01-14 PROCEDURE — 93005 ELECTROCARDIOGRAM TRACING: CPT | Mod: XU

## 2025-01-14 PROCEDURE — C1769: CPT

## 2025-01-14 PROCEDURE — 93010 ELECTROCARDIOGRAM REPORT: CPT

## 2025-01-14 PROCEDURE — 93458 L HRT ARTERY/VENTRICLE ANGIO: CPT

## 2025-01-14 PROCEDURE — C1887: CPT

## 2025-01-14 RX ORDER — APIXABAN 5 MG/1
1 TABLET, FILM COATED ORAL
Qty: 0 | Refills: 0 | DISCHARGE

## 2025-01-14 RX ORDER — FAMOTIDINE 20 MG/1
1 TABLET, FILM COATED ORAL
Refills: 0 | DISCHARGE

## 2025-01-14 RX ORDER — ACETAMINOPHEN 80 MG/.8ML
650 SOLUTION/ DROPS ORAL ONCE
Refills: 0 | Status: COMPLETED | OUTPATIENT
Start: 2025-01-14 | End: 2025-01-14

## 2025-01-14 RX ORDER — MIDAZOLAM HYDROCHLORIDE 1 MG/ML
1 INJECTION INTRAMUSCULAR; INTRAVENOUS ONCE
Refills: 0 | Status: DISCONTINUED | OUTPATIENT
Start: 2025-01-14 | End: 2025-01-14

## 2025-01-14 RX ORDER — SODIUM CHLORIDE 9 MG/ML
1000 INJECTION, SOLUTION INTRAMUSCULAR; INTRAVENOUS; SUBCUTANEOUS
Refills: 0 | Status: DISCONTINUED | OUTPATIENT
Start: 2025-01-14 | End: 2025-01-14

## 2025-01-14 RX ORDER — SODIUM CHLORIDE 9 MG/ML
250 INJECTION, SOLUTION INTRAMUSCULAR; INTRAVENOUS; SUBCUTANEOUS ONCE
Refills: 0 | Status: COMPLETED | OUTPATIENT
Start: 2025-01-14 | End: 2025-01-14

## 2025-01-14 RX ADMIN — ACETAMINOPHEN 650 MILLIGRAM(S): 80 SOLUTION/ DROPS ORAL at 11:12

## 2025-01-14 RX ADMIN — MIDAZOLAM HYDROCHLORIDE 1 MILLIGRAM(S): 1 INJECTION INTRAMUSCULAR; INTRAVENOUS at 08:05

## 2025-01-14 RX ADMIN — SODIUM CHLORIDE 250 MILLILITER(S): 9 INJECTION, SOLUTION INTRAMUSCULAR; INTRAVENOUS; SUBCUTANEOUS at 07:32

## 2025-01-14 RX ADMIN — SODIUM CHLORIDE 220 MILLILITER(S): 9 INJECTION, SOLUTION INTRAMUSCULAR; INTRAVENOUS; SUBCUTANEOUS at 09:47

## 2025-01-14 NOTE — ASU DISCHARGE PLAN (ADULT/PEDIATRIC) - CARE PROVIDER_API CALL
Adan Quesada  Cardiovascular Disease  [address] [place] [address]  Phone: [phone]  Fax: [phone]  Established Patient  Follow Up Time: 2 weeks

## 2025-01-14 NOTE — PROGRESS NOTE ADULT - SUBJECTIVE AND OBJECTIVE BOX
Department of Cardiology                                                               Division of Interventional Cardiology                                                               [facility] /[address] [address]                                                                                 [phone]           Post Procedure Progress Note  Patient is a 69y old  Female who presents with a chief complaint of LHC (13 Jan 2025 15:45)      Patient is  now s/p left heart catheterization    s/p LHC : revealing moderate RCA disease approx 30%  Pt denies chest pain/SOB/palpitations post cath.  PROCEDURE SITE: --RRA accessed. hemoband to be removed 1 hour post placement.  Site is without hematoma or bleeding. Sensation and ROBERT intact. Distal pulses palpable 2+, capillary refill < 2 seconds.       Vital Signs  T(C): 36.8 (01-14-25 @ 07:24), Max: 36.8 (01-14-25 @ 07:21)  HR: 53 (01-14-25 @ 08:09) (53 - 58)  BP: 101/48 (01-14-25 @ 08:09) (101/48 - 127/54)  RR: 14 (01-14-25 @ 08:09) (14 - 20)  SpO2: 96% (01-14-25 @ 08:09) (96% - 100%)  Wt(kg): --    Home Medications:  allopurinol 300 mg oral tablet: 1 tab(s) orally once a day (14 Jan 2025 07:23)  atorvastatin 40 mg oral tablet: 1 tab(s) orally once a day (14 Jan 2025 07:23)  CoQ10 100 mg oral capsule: 1 orally once a day (14 Jan 2025 07:23)  D3-50 1250 mcg (50,000 intl units) oral capsule: 1 cap(s) orally once a week (14 Jan 2025 07:23)  famotidine 20 mg oral tablet: 1 tab(s) orally once a day as needed for  heartburn (14 Jan 2025 07:23)  Lopressor 50 mg oral tablet: 1.5 orally 2 times a day (14 Jan 2025 07:23)  losartan 50 mg oral tablet: 1 orally once a day (14 Jan 2025 07:23)  Multaq 400 mg oral tablet: 1 orally 2 times a day (14 Jan 2025 07:23)  Tylenol 500 mg oral tablet: 1 tab(s) orally 3 times a day as needed for  moderate pain (14 Jan 2025 07:23)      PHYSICAL EXAM:  NEURO: Non-focal, AxOx3.  No neuro deficits   CHEST/LUNG: Clear to auscultation bilaterally; No wheeze  HEART: s1 s2 Regular rate and rhythm; No murmurs, rubs, or gallops  ABDOMEN: Soft, Nontender, Nondistended; Bowel sounds present X 4 quadrants   EXTREMITIES:  2+ Peripheral Pulses, No clubbing, cyanosis, or edema   VASCULAR: Peripheral pulses palpable 2+ bilaterally      PROCEDURE RESULTS:  full report to follow               PLAN:  -VS, diet, activity as per post cath orders  - IVF per LEYLA protocol   -Encourage PO fluids  -Continue current medications  - resume eliquis tonight   -Post cath instructions reviewed, post sedation instructions reviewed; patient verbalizes and understands instructions  -Discussed therapeutic lifestyle changes to reduce risk factors such as following a cardiac diet, weight loss, maintaining a healthy weight, exercise, smoking cessation, medication compliance, and regular follow-up  with PCP/Cardioloigst  - Patient to be discharged home, recommend following up with cardiologist in 2 weeks  -Plan of care discussed with patient, RN and Dr. Gonzalez

## 2025-01-14 NOTE — PACU DISCHARGE NOTE - COMMENTS
patient given verbal and written discharge instructions. dressing to right wrist dry and intact. iv lock removed. pt has no complaints at this time. patient denies pain.  at bedside for discharge. left via wheelchair for discharge but ambulated to bathroom with no assist prior to that.

## 2025-01-14 NOTE — BRIEF OPERATIVE NOTE - NSICDXBRIEFPOSTOP_GEN_ALL_CORE_FT
POST-OP DIAGNOSIS:  Nonobstructive atherosclerosis of coronary artery 14-Jan-2025 10:57:23  Anh Shi

## 2025-01-14 NOTE — ASU DISCHARGE PLAN (ADULT/PEDIATRIC) - FINANCIAL ASSISTANCE
[facility] provides services at a reduced cost to those who are determined to be eligible through [facility]’s financial assistance program. Information regarding [facility]’s financial assistance program can be found by going to [email or web address] or by calling [phone].

## 2025-01-15 DIAGNOSIS — R94.39 ABNORMAL RESULT OF OTHER CARDIOVASCULAR FUNCTION STUDY: ICD-10-CM

## 2025-01-16 NOTE — POST DISCHARGE NOTE - DETAILS:
Post procedure phone call completed; patient understood all discharge paperwork. No questions regarding medications or pain management. MD follow up appointment made. Patient was able to rest when they were discharged. Patient will recommend [facility], no complaints of hospital stay, satisfied with care. Instructed patient to contact provider with any further questions or concerns.

## 2025-04-01 ENCOUNTER — NON-APPOINTMENT (OUTPATIENT)
Age: 70
End: 2025-04-01

## 2025-04-04 ENCOUNTER — OUTPATIENT (OUTPATIENT)
Dept: OUTPATIENT SERVICES | Facility: HOSPITAL | Age: 70
LOS: 1 days | End: 2025-04-04
Payer: COMMERCIAL

## 2025-04-04 ENCOUNTER — TRANSCRIPTION ENCOUNTER (OUTPATIENT)
Age: 70
End: 2025-04-04

## 2025-04-04 VITALS
HEART RATE: 102 BPM | SYSTOLIC BLOOD PRESSURE: 114 MMHG | RESPIRATION RATE: 16 BRPM | WEIGHT: 220.02 LBS | OXYGEN SATURATION: 96 % | TEMPERATURE: 98 F | HEIGHT: 65 IN | DIASTOLIC BLOOD PRESSURE: 83 MMHG

## 2025-04-04 VITALS
RESPIRATION RATE: 18 BRPM | OXYGEN SATURATION: 97 % | DIASTOLIC BLOOD PRESSURE: 56 MMHG | SYSTOLIC BLOOD PRESSURE: 101 MMHG | HEART RATE: 55 BPM

## 2025-04-04 DIAGNOSIS — I48.91 UNSPECIFIED ATRIAL FIBRILLATION: ICD-10-CM

## 2025-04-04 DIAGNOSIS — Z90.710 ACQUIRED ABSENCE OF BOTH CERVIX AND UTERUS: Chronic | ICD-10-CM

## 2025-04-04 DIAGNOSIS — Z98.890 OTHER SPECIFIED POSTPROCEDURAL STATES: Chronic | ICD-10-CM

## 2025-04-04 DIAGNOSIS — Z90.49 ACQUIRED ABSENCE OF OTHER SPECIFIED PARTS OF DIGESTIVE TRACT: Chronic | ICD-10-CM

## 2025-04-04 DIAGNOSIS — Z98.1 ARTHRODESIS STATUS: Chronic | ICD-10-CM

## 2025-04-04 LAB
ANION GAP SERPL CALC-SCNC: 11 MMOL/L — SIGNIFICANT CHANGE UP (ref 5–17)
BUN SERPL-MCNC: 21 MG/DL — SIGNIFICANT CHANGE UP (ref 7–23)
CALCIUM SERPL-MCNC: 9.6 MG/DL — SIGNIFICANT CHANGE UP (ref 8.4–10.5)
CHLORIDE SERPL-SCNC: 105 MMOL/L — SIGNIFICANT CHANGE UP (ref 96–108)
CO2 SERPL-SCNC: 24 MMOL/L — SIGNIFICANT CHANGE UP (ref 22–31)
CREAT SERPL-MCNC: 1.11 MG/DL — SIGNIFICANT CHANGE UP (ref 0.5–1.3)
EGFR: 53 ML/MIN/1.73M2 — LOW
EGFR: 53 ML/MIN/1.73M2 — LOW
GLUCOSE SERPL-MCNC: 119 MG/DL — HIGH (ref 70–99)
HCT VFR BLD CALC: 40.3 % — SIGNIFICANT CHANGE UP (ref 34.5–45)
HGB BLD-MCNC: 13 G/DL — SIGNIFICANT CHANGE UP (ref 11.5–15.5)
MAGNESIUM SERPL-MCNC: 2.1 MG/DL — SIGNIFICANT CHANGE UP (ref 1.6–2.6)
MCHC RBC-ENTMCNC: 31.4 PG — SIGNIFICANT CHANGE UP (ref 27–34)
MCHC RBC-ENTMCNC: 32.3 G/DL — SIGNIFICANT CHANGE UP (ref 32–36)
MCV RBC AUTO: 97.3 FL — SIGNIFICANT CHANGE UP (ref 80–100)
NRBC BLD AUTO-RTO: 0 /100 WBCS — SIGNIFICANT CHANGE UP (ref 0–0)
PLATELET # BLD AUTO: 231 K/UL — SIGNIFICANT CHANGE UP (ref 150–400)
POTASSIUM SERPL-MCNC: 4.6 MMOL/L — SIGNIFICANT CHANGE UP (ref 3.5–5.3)
POTASSIUM SERPL-SCNC: 4.6 MMOL/L — SIGNIFICANT CHANGE UP (ref 3.5–5.3)
RBC # BLD: 4.14 M/UL — SIGNIFICANT CHANGE UP (ref 3.8–5.2)
RBC # FLD: 13.9 % — SIGNIFICANT CHANGE UP (ref 10.3–14.5)
SODIUM SERPL-SCNC: 140 MMOL/L — SIGNIFICANT CHANGE UP (ref 135–145)
WBC # BLD: 6.64 K/UL — SIGNIFICANT CHANGE UP (ref 3.8–10.5)
WBC # FLD AUTO: 6.64 K/UL — SIGNIFICANT CHANGE UP (ref 3.8–10.5)

## 2025-04-04 PROCEDURE — 93005 ELECTROCARDIOGRAM TRACING: CPT

## 2025-04-04 PROCEDURE — 83735 ASSAY OF MAGNESIUM: CPT

## 2025-04-04 PROCEDURE — 85027 COMPLETE CBC AUTOMATED: CPT

## 2025-04-04 PROCEDURE — 92960 CARDIOVERSION ELECTRIC EXT: CPT

## 2025-04-04 PROCEDURE — 93010 ELECTROCARDIOGRAM REPORT: CPT | Mod: 76

## 2025-04-04 PROCEDURE — 80048 BASIC METABOLIC PNL TOTAL CA: CPT

## 2025-04-04 RX ORDER — AMIODARONE HYDROCHLORIDE 50 MG/ML
1 INJECTION, SOLUTION INTRAVENOUS
Refills: 0 | DISCHARGE

## 2025-04-04 RX ORDER — AMIODARONE HYDROCHLORIDE 50 MG/ML
1 INJECTION, SOLUTION INTRAVENOUS
Qty: 30 | Refills: 1
Start: 2025-04-04 | End: 2025-06-02

## 2025-04-04 RX ORDER — FUROSEMIDE 10 MG/ML
1 INJECTION INTRAMUSCULAR; INTRAVENOUS
Refills: 0 | DISCHARGE

## 2025-04-04 NOTE — ASU PATIENT PROFILE, ADULT - NSICDXPASTMEDICALHX_GEN_ALL_CORE_FT
Medication(s) Requested: gabapentin  Last office visit: 03-  Last refill: 2-1-19  Is the patient due for refill of this medication(s): Yes  PDMP review: Criteria met. Forwarded to Physician/ELADIO for signature.     
PAST MEDICAL HISTORY:  Chronic back pain     CVA, old, dysarthria     Gout     Hypertension     OA (osteoarthritis)     PFO (patent foramen ovale)     Thyroid nodule

## 2025-04-04 NOTE — ASU PREOP CHECKLIST - SPO2 (%)
99 Cheek Interpolation Flap Text: A decision was made to reconstruct the defect utilizing an interpolation axial flap and a staged reconstruction.  A telfa template was made of the defect.  This telfa template was then used to outline the Cheek Interpolation flap.  The donor area for the pedicle flap was then injected with anesthesia.  The flap was excised through the skin and subcutaneous tissue down to the layer of the underlying musculature.  The interpolation flap was carefully excised within this deep plane to maintain its blood supply.  The edges of the donor site were undermined.   The donor site was closed in a primary fashion.  The pedicle was then rotated into position and sutured.  Once the tube was sutured into place, adequate blood supply was confirmed with blanching and refill.  The pedicle was then wrapped with xeroform gauze and dressed appropriately with a telfa and gauze bandage to ensure continued blood supply and protect the attached pedicle.

## 2025-04-04 NOTE — H&P CARDIOLOGY - HISTORY OF PRESENT ILLNESS
This is a69yr old   female with no known implantable devices noted with  PMHX HTN, AFIB on Eliquis ( last dose 4/4/25 am 0500 denies any missed doses), Hyperthyroidism, CVA,  Obesity,  and PFO closure . Patient reports feeling palpitations recently. Spoke with Dr. Moreno now presents for Cardioversion this am . EKG AFib . Currently no Chest pain no sob no lightheadedness or dizziness noted .   < from: Cardiac Catheterization (01.14.25 @ 09:10) >  Indications:               Abnormal stress test     Diagnostic Conclusions:   The coronary anatomy is normal. Normal left ventricular function .     Recommendations:   Aggressive risk factor modification with diet, exercise, and a goal  LDL of less than 70.    General Impressions:   General Diagnostic:      General Diagnostic Impressions     There is no angiographic evidence for coronary artery disease.      Procedure Narrative:   The risks and alternatives of the procedures and conscious sedation  were explained to the patient and informed consent was  obtained. The patient was brought to the cath lab and placed on the  exam table.  Access   Right radial artery:   The puncture site was infiltrated with 1% Lidocaine. Vascular access  was obtained using modified seldinger technique.  Hemostasis/Sheath Status:  using mechanical compression.        < end of copied text >  < from: MER Complete w/Spect and Color (02.05.20 @ 09:52) >   Impression     Summary     The left ventricle is normal in size, wall thickness, wall motion and   contractility.   The left atrium is dilated.   No thrombusseen in the left atrial appendage.   A saline contrast study was performed and showed no extravsation of   contrast through the PFO closure device.   Normal appearing right ventricle structure and function.     Mild (1+) mitral regurgitation is present.   Mild (1+) tricuspid valve regurgitation is present.     Following the MER, cardioversion with 200J x 1 was performed and sinus   rhythm was restored.     Signature     ----------------------------------------------------------------   Electronically signed by Reji Felix DO(Interpreting   physician) on 02/07/2020 08:26 AM   ----------------------------------------------------------------    < end of copied text >  < from: Transthoracic Echocardiogram (08.23.18 @ 15:38) >  Conclusions:  1. Normal left atrium.  LA volume index = 7 cc/m2. An  atrial septal closure device is seen on the interatrial  septum and appearswell-seated with no residual interatrial  flow by color Doppler.  2. Normal left ventricular internal dimensions and wall  thicknesses.  3. Normal left ventricular systolic function. No segmental  wall motion abnormalities.  4. Normal right ventricular size and function.  5. Estimated pulmonary artery systolic pressure equals 29  mm Hg, assuming right atrial pressure equals 8  mm Hg,  consistent with normal pulmonary pressures.  6. Normal pericardium with no pericardial effusion.  *** No previousEcho exam.  ------------------------------------------------------------------------  Confirmed on  8/23/2018 - 17:09:54 by Gio Barillas M.D.  ------------------------------------------------------------------------    < end of copied text >   This is a69yr old   female with no known implantable devices noted with  PMHX HTN, AFIB on Eliquis ( last dose 4/4/25 am 0500 denies any missed doses), Hyperthyroidism, Gout , OA, CVA,( TIA ) no residual  Obesity,  and PFO closure . Patient reports feeling palpitations , sob and LE edema over past week . Pt presents for  Cardioversion this am wtih Dr. Moreno . EKG AFib . Currently no Chest pain no sob no lightheadedness or dizziness noted .   Pt uses walker at home has back pain .  < from: Cardiac Catheterization (01.14.25 @ 09:10) >  Indications:               Abnormal stress test     Diagnostic Conclusions:   The coronary anatomy is normal. Normal left ventricular function .     Recommendations:   Aggressive risk factor modification with diet, exercise, and a goal  LDL of less than 70.    General Impressions:   General Diagnostic:      General Diagnostic Impressions     There is no angiographic evidence for coronary artery disease.      Procedure Narrative:   The risks and alternatives of the procedures and conscious sedation  were explained to the patient and informed consent was  obtained. The patient was brought to the cath lab and placed on the  exam table.  Access   Right radial artery:   The puncture site was infiltrated with 1% Lidocaine. Vascular access  was obtained using modified seldinger technique.  Hemostasis/Sheath Status:  using mechanical compression.        < end of copied text >  < from: MER Complete w/Spect and Color (02.05.20 @ 09:52) >   Impression     Summary     The left ventricle is normal in size, wall thickness, wall motion and   contractility.   The left atrium is dilated.   No thrombusseen in the left atrial appendage.   A saline contrast study was performed and showed no extravsation of   contrast through the PFO closure device.   Normal appearing right ventricle structure and function.     Mild (1+) mitral regurgitation is present.   Mild (1+) tricuspid valve regurgitation is present.     Following the MER, cardioversion with 200J x 1 was performed and sinus   rhythm was restored.     Signature     ----------------------------------------------------------------   Electronically signed by Reji Felix DO(Interpreting   physician) on 02/07/2020 08:26 AM   ----------------------------------------------------------------    < end of copied text >  < from: Transthoracic Echocardiogram (08.23.18 @ 15:38) >  Conclusions:  1. Normal left atrium.  LA volume index = 7 cc/m2. An  atrial septal closure device is seen on the interatrial  septum and appearswell-seated with no residual interatrial  flow by color Doppler.  2. Normal left ventricular internal dimensions and wall  thicknesses.  3. Normal left ventricular systolic function. No segmental  wall motion abnormalities.  4. Normal right ventricular size and function.  5. Estimated pulmonary artery systolic pressure equals 29  mm Hg, assuming right atrial pressure equals 8  mm Hg,  consistent with normal pulmonary pressures.  6. Normal pericardium with no pericardial effusion.  *** No previousEcho exam.  ------------------------------------------------------------------------  Confirmed on  8/23/2018 - 17:09:54 by Gio Barillas M.D.  ------------------------------------------------------------------------    < end of copied text >

## 2025-04-04 NOTE — ASU DISCHARGE PLAN (ADULT/PEDIATRIC) - FINANCIAL ASSISTANCE
[facility] provides services at a reduced cost to those who are determined to be eligible through [facility]’s financial assistance program. Information regarding [facility]’s financial assistance program can be found by going to [email or web address] or by calling [phone].

## 2025-04-04 NOTE — H&P CARDIOLOGY - URINARY CATHETER
[facility]  Vaginal Delivery Note    Delivery     Delivery: Vaginal, Spontaneous     YOB: 2020    Time of Birth:  Gestational Age 12:13 PM   35w5d     Anesthesia: Epidural     Delivering clinician: Grover Pérez    Forceps?   No   Vacuum? No    Shoulder dystocia present: No        Delivery narrative:  Progressed to C/C/+2 and pushed well to deliver a LVIM. WESTLEY to LOT vigorous to mom's abdomen. Placenta spontaneous and intact with 3VC after IV Pitocin. Left labial laceration repaired with 3-0 Vicryl Rapide. Epidural anes. EBL 250mL. No complications. Sponge and needle count correct.     Infant    Findings: male  infant     Infant observations: Weight: No birth weight on file.   Length:    in  Observations/Comments:         Apgars:    @ 1 minute /       @ 5 minutes   Infant Name:      Placenta, Cord, and Fluid    Placenta delivered  Spontaneous  at   8/8/2019 12:18 PM     Cord: 3 vessels  present.   Nuchal Cord?  no   Cord blood obtained: Yes    Cord gases obtained:  No    Cord gas results: Venous:  No results found for: PHCVEN    Arterial:  No results found for: PHCART     Repair    Episiotomy: None    Lacerations: Yes  Laceration Information  Laceration Repaired?   Perineal:         Periurethral:         Labial: left  Yes    Sulcus:         Vaginal:         Cervical:           Suture used for repair: 3-0 Vicryl Rapide   Estimated Blood Loss:  250 mL           Complications  none    Disposition  Mother to Mother Baby/Postpartum  in stable condition currently.  Baby to remains with mom  in stable condition currently.      Grover Pérez MD  08/08/19  12:30 PM       no

## 2025-04-04 NOTE — ASU PATIENT PROFILE, ADULT - FALL HARM RISK - UNIVERSAL INTERVENTIONS
Bed in lowest position, wheels locked, appropriate side rails in place/Call bell, personal items and telephone in reach/Instruct patient to call for assistance before getting out of bed or chair/Non-slip footwear when patient is out of bed/[place] to call system/Physically safe environment - no spills, clutter or unnecessary equipment/Purposeful Proactive Rounding/Room/bathroom lighting operational, light cord in reach

## 2025-04-04 NOTE — ASU DISCHARGE PLAN (ADULT/PEDIATRIC) - CARE PROVIDER_API CALL
Thad Moreno.  Cardiac Electrophysiology  [address] [place] [id number]  Phone: [phone]  Fax: [phone]  Established Patient  Follow Up Time:

## 2025-04-04 NOTE — ASU DISCHARGE PLAN (ADULT/PEDIATRIC) - "IF YOU OR YOUR GUARDIAN/FAMILY IS A SMOKER, IT IS IMPORTANT FOR YOUR HEALTH TO STOP SMOKING. PLEASE BE AWARE THAT SECOND HAND SMOKE IS ALSO HARMFUL."
Pharmacy requested refills that are already active on file. Refused request to pharmacy.   Statement Selected

## 2025-04-04 NOTE — H&P CARDIOLOGY - VASCULAR
Equal and normal pulses (carotid, femoral, dorsalis pedis) Mastoid Interpolation Flap Text: A decision was made to reconstruct the defect utilizing an interpolation axial flap and a staged reconstruction.  A telfa template was made of the defect.  This telfa template was then used to outline the mastoid interpolation flap.  The donor area for the pedicle flap was then injected with anesthesia.  The flap was excised through the skin and subcutaneous tissue down to the layer of the underlying musculature.  The pedicle flap was carefully excised within this deep plane to maintain its blood supply.  The edges of the donor site were undermined.   The donor site was closed in a primary fashion.  The pedicle was then rotated into position and sutured.  Once the tube was sutured into place, adequate blood supply was confirmed with blanching and refill.  The pedicle was then wrapped with xeroform gauze and dressed appropriately with a telfa and gauze bandage to ensure continued blood supply and protect the attached pedicle.

## 2025-04-04 NOTE — PRE-ANESTHESIA EVALUATION ADULT - NSANTHPMHFT_GEN_ALL_CORE
This is a69yr old   female with no known implantable devices noted with  PMHX HTN, AFIB on Eliquis ( last dose 4/4/25 am 0500 denies any missed doses), Hyperthyroidism, Gout , OA, CVA,( TIA ) no residual  Obesity,  and PFO closure . Patient reports feeling palpitations , sob and LE edema over past week . Pt presents for  Cardioversion this am wt Dr. Moreno . EKG AFib . Currently no Chest pain no sob no lightheadedness or dizziness noted .   Pt uses walker at home has back pain .

## 2025-04-22 ENCOUNTER — APPOINTMENT (OUTPATIENT)
Dept: ELECTROPHYSIOLOGY | Facility: CLINIC | Age: 70
End: 2025-04-22

## 2025-05-20 ENCOUNTER — APPOINTMENT (OUTPATIENT)
Dept: ELECTROPHYSIOLOGY | Facility: CLINIC | Age: 70
End: 2025-05-20

## 2025-06-04 ENCOUNTER — OUTPATIENT (OUTPATIENT)
Dept: OUTPATIENT SERVICES | Facility: HOSPITAL | Age: 70
LOS: 1 days | End: 2025-06-04
Payer: COMMERCIAL

## 2025-06-04 VITALS
SYSTOLIC BLOOD PRESSURE: 146 MMHG | DIASTOLIC BLOOD PRESSURE: 81 MMHG | WEIGHT: 227.96 LBS | OXYGEN SATURATION: 98 % | HEART RATE: 62 BPM | HEIGHT: 65 IN | TEMPERATURE: 98 F | RESPIRATION RATE: 16 BRPM

## 2025-06-04 DIAGNOSIS — Z90.49 ACQUIRED ABSENCE OF OTHER SPECIFIED PARTS OF DIGESTIVE TRACT: Chronic | ICD-10-CM

## 2025-06-04 DIAGNOSIS — Z98.890 OTHER SPECIFIED POSTPROCEDURAL STATES: Chronic | ICD-10-CM

## 2025-06-04 DIAGNOSIS — I48.91 UNSPECIFIED ATRIAL FIBRILLATION: ICD-10-CM

## 2025-06-04 DIAGNOSIS — Z01.818 ENCOUNTER FOR OTHER PREPROCEDURAL EXAMINATION: ICD-10-CM

## 2025-06-04 DIAGNOSIS — Z91.89 OTHER SPECIFIED PERSONAL RISK FACTORS, NOT ELSEWHERE CLASSIFIED: ICD-10-CM

## 2025-06-04 DIAGNOSIS — I10 ESSENTIAL (PRIMARY) HYPERTENSION: ICD-10-CM

## 2025-06-04 DIAGNOSIS — Z98.1 ARTHRODESIS STATUS: Chronic | ICD-10-CM

## 2025-06-04 DIAGNOSIS — Z90.710 ACQUIRED ABSENCE OF BOTH CERVIX AND UTERUS: Chronic | ICD-10-CM

## 2025-06-04 LAB
ANION GAP SERPL CALC-SCNC: 12 MMOL/L — SIGNIFICANT CHANGE UP (ref 5–17)
BLD GP AB SCN SERPL QL: NEGATIVE — SIGNIFICANT CHANGE UP
BUN SERPL-MCNC: 19 MG/DL — SIGNIFICANT CHANGE UP (ref 7–23)
CALCIUM SERPL-MCNC: 10.6 MG/DL — HIGH (ref 8.4–10.5)
CHLORIDE SERPL-SCNC: 104 MMOL/L — SIGNIFICANT CHANGE UP (ref 96–108)
CO2 SERPL-SCNC: 24 MMOL/L — SIGNIFICANT CHANGE UP (ref 22–31)
CREAT SERPL-MCNC: 0.85 MG/DL — SIGNIFICANT CHANGE UP (ref 0.5–1.3)
EGFR: 74 ML/MIN/1.73M2 — SIGNIFICANT CHANGE UP
EGFR: 74 ML/MIN/1.73M2 — SIGNIFICANT CHANGE UP
GLUCOSE SERPL-MCNC: 105 MG/DL — HIGH (ref 70–99)
HCT VFR BLD CALC: 44.5 % — SIGNIFICANT CHANGE UP (ref 34.5–45)
HGB BLD-MCNC: 14.5 G/DL — SIGNIFICANT CHANGE UP (ref 11.5–15.5)
MCHC RBC-ENTMCNC: 31.2 PG — SIGNIFICANT CHANGE UP (ref 27–34)
MCHC RBC-ENTMCNC: 32.6 G/DL — SIGNIFICANT CHANGE UP (ref 32–36)
MCV RBC AUTO: 95.7 FL — SIGNIFICANT CHANGE UP (ref 80–100)
NRBC BLD AUTO-RTO: 0 /100 WBCS — SIGNIFICANT CHANGE UP (ref 0–0)
PLATELET # BLD AUTO: 242 K/UL — SIGNIFICANT CHANGE UP (ref 150–400)
POTASSIUM SERPL-MCNC: 5 MMOL/L — SIGNIFICANT CHANGE UP (ref 3.5–5.3)
POTASSIUM SERPL-SCNC: 5 MMOL/L — SIGNIFICANT CHANGE UP (ref 3.5–5.3)
RBC # BLD: 4.65 M/UL — SIGNIFICANT CHANGE UP (ref 3.8–5.2)
RBC # FLD: 13.9 % — SIGNIFICANT CHANGE UP (ref 10.3–14.5)
RH IG SCN BLD-IMP: POSITIVE — SIGNIFICANT CHANGE UP
SODIUM SERPL-SCNC: 140 MMOL/L — SIGNIFICANT CHANGE UP (ref 135–145)
WBC # BLD: 5.44 K/UL — SIGNIFICANT CHANGE UP (ref 3.8–10.5)
WBC # FLD AUTO: 5.44 K/UL — SIGNIFICANT CHANGE UP (ref 3.8–10.5)

## 2025-06-04 PROCEDURE — 80048 BASIC METABOLIC PNL TOTAL CA: CPT

## 2025-06-04 PROCEDURE — G0463: CPT

## 2025-06-04 PROCEDURE — 86850 RBC ANTIBODY SCREEN: CPT

## 2025-06-04 PROCEDURE — 85027 COMPLETE CBC AUTOMATED: CPT

## 2025-06-04 PROCEDURE — 86901 BLOOD TYPING SEROLOGIC RH(D): CPT

## 2025-06-04 PROCEDURE — 86900 BLOOD TYPING SEROLOGIC ABO: CPT

## 2025-06-04 NOTE — H&P PST ADULT - PROBLEM SELECTOR PLAN 1
Scheduled for Afib Ablation/ Watchman   Pre-operative instructions and chlorhexidine given.  Labs: cbc, bmp, T/S, drawn in PST  DOS: ABO    No interruption in Anticoagulation. Will remain on Eliquis.

## 2025-06-04 NOTE — H&P PST ADULT - HEMATOLOGY/LYMPHATICS COMMENTS
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

## 2025-06-04 NOTE — H&P PST ADULT - HISTORY OF PRESENT ILLNESS
This is a69yr old  female with PMHX HTN, AFIB on Eliquis, Hyperparathyroidism, Thyroid nodule ( Monitored by Endo, plans for thyroidectomy once afib under control) Gout , OA, CVA,( TIA ) no residual Obesity, chronic back pain (ambulates with Rollator) and PFO closure s/p Cardioversion 4/4/25. Presenting to PST prior to scheduled Afib/ablation on 6/18/25 with Dr. Moreno.     Patient endorses she had surgery on 5/8/25 with Dr. Diop  for occluded artey in Left groin. With instruections to continue plavix x 30days. Last dose on 6/9/25.  Currently no Chest pain, palpitations,  no sob, no lightheadedness or dizziness noted .Trace edema Left ankle, improves with elevation.      This is a69yr old  female with PMHX HTN, AFIB on Eliquis, Hyperparathyroidism, Thyroid nodule ( Monitored by Endo, plans for thyroidectomy once afib under control) Gout , OA, CVA,( TIA ) no residual Obesity, chronic back pain (ambulates with Rollator) and PFO closure s/p Cardioversion 4/4/25. 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 Phelan with stent placement x1 via left groin. Post op plavix x 30days. Last dose on 6/7/25.    Presenting to PST prior to scheduled Afib/ablation on 6/18/25 with Dr. Moreno.   Denies Chest pain, palpitations,  no sob, no lightheadedness or dizziness noted .Trace edema Left ankle, improves with elevation.

## 2025-06-04 NOTE — H&P PST ADULT - NS MD HP INPLANTS MED DEV
hardware lumbar spine, hernia repair with mesh, Left iliac stent x 1, Bilateral knee replacement/Artificial joint

## 2025-06-04 NOTE — H&P PST ADULT - OTHER CARE PROVIDERS
Cards: Dr. Quesada [phone] last visit 5/2025; Endo: Dr. Seymour [phone] last viist 2 weeks ago. next visit 6/9/25

## 2025-06-04 NOTE — H&P PST ADULT - ASSESSMENT
DASI score:  DASI activity: Abulates with rollater for balance ; Light cooking, cleaning, grocery shoppings.   Loose teeth or denture:denies   DASI score:6.27  DASI activity: Ambulates with rollator for balance ; Light cooking, cleaning, grocery shopping   Loose teeth or denture: denies

## 2025-06-04 NOTE — H&P PST ADULT - NSICDXPASTMEDICALHX_GEN_ALL_CORE_FT
PAST MEDICAL HISTORY:  Chronic back pain     CVA, old, dysarthria     Gout     Hypertension     OA (osteoarthritis)     PFO (patent foramen ovale)     Thyroid nodule      PAST MEDICAL HISTORY:  Chronic back pain     CVA, old, dysarthria     Gout     Hypertension     May-Thurner syndrome     OA (osteoarthritis)     PFO (patent foramen ovale)     Thyroid nodule

## 2025-06-04 NOTE — H&P PST ADULT - OPHTHALMOLOGIC COMMENTS
Yellow spot with bright list 3-4 days, Opth, visit 12,25 occasional yellow spots  with bright light started 3-4 days ago  Last Opth visit  December, 2024

## 2025-06-04 NOTE — H&P PST ADULT - NEUROLOGICAL COMMENTS
Numbness and tingling  left leg; Weakness in both arms unable to reach over head due to shoulder pain

## 2025-06-04 NOTE — PACU DISCHARGE NOTE - COMMENTS
Addended by: FELIZ MORILLO on: 6/4/2025 03:57 PM     Modules accepted: Orders     May d/c once patient meets Pacu d/c criteria

## 2025-06-04 NOTE — H&P PST ADULT - PRO TOBACCO TYPE
Quit smking 1.5 ppd x20 years- Quit 10 years ago. Currently a E Cigarette use/vaping/ e-cigarettes 1.5 ppd x20 years- Quit 2015. Currently uses E Cigarette use/vaping/ e-cigarettes

## 2025-06-04 NOTE — H&P PST ADULT - MUSCULOSKELETAL COMMENTS
Chronic back (ower)  Sciatica radiates down-left leg " feels like an electric shock" Chronic lower back pain,  Sciatica pain radiates down-left leg " feels like an electric shock"

## 2025-06-18 ENCOUNTER — TRANSCRIPTION ENCOUNTER (OUTPATIENT)
Age: 70
End: 2025-06-18

## 2025-06-18 ENCOUNTER — INPATIENT (INPATIENT)
Facility: HOSPITAL | Age: 70
LOS: 0 days | Discharge: HOME | DRG: 310 | End: 2025-06-19
Attending: INTERNAL MEDICINE | Admitting: INTERNAL MEDICINE
Payer: COMMERCIAL

## 2025-06-18 VITALS
HEART RATE: 60 BPM | HEIGHT: 65 IN | WEIGHT: 227.96 LBS | OXYGEN SATURATION: 97 % | TEMPERATURE: 100 F | DIASTOLIC BLOOD PRESSURE: 89 MMHG | SYSTOLIC BLOOD PRESSURE: 181 MMHG | RESPIRATION RATE: 16 BRPM

## 2025-06-18 DIAGNOSIS — Z98.890 OTHER SPECIFIED POSTPROCEDURAL STATES: Chronic | ICD-10-CM

## 2025-06-18 DIAGNOSIS — Z98.1 ARTHRODESIS STATUS: Chronic | ICD-10-CM

## 2025-06-18 DIAGNOSIS — Z90.710 ACQUIRED ABSENCE OF BOTH CERVIX AND UTERUS: Chronic | ICD-10-CM

## 2025-06-18 DIAGNOSIS — Z90.49 ACQUIRED ABSENCE OF OTHER SPECIFIED PARTS OF DIGESTIVE TRACT: Chronic | ICD-10-CM

## 2025-06-18 DIAGNOSIS — I48.91 UNSPECIFIED ATRIAL FIBRILLATION: ICD-10-CM

## 2025-06-18 LAB — RH IG SCN BLD-IMP: POSITIVE — SIGNIFICANT CHANGE UP

## 2025-06-18 PROCEDURE — 93010 ELECTROCARDIOGRAM REPORT: CPT

## 2025-06-18 PROCEDURE — 93657 TX L/R ATRIAL FIB ADDL: CPT

## 2025-06-18 PROCEDURE — 93656 COMPRE EP EVAL ABLTJ ATR FIB: CPT

## 2025-06-18 PROCEDURE — 71045 X-RAY EXAM CHEST 1 VIEW: CPT | Mod: 26

## 2025-06-18 PROCEDURE — 33340 PERQ CLSR TCAT L ATR APNDGE: CPT

## 2025-06-18 RX ORDER — CLOPIDOGREL BISULFATE 75 MG/1
1 TABLET, FILM COATED ORAL
Refills: 0 | DISCHARGE

## 2025-06-18 RX ORDER — CEFAZOLIN SODIUM IN 0.9 % NACL 3 G/100 ML
2000 INTRAVENOUS SOLUTION, PIGGYBACK (ML) INTRAVENOUS ONCE
Refills: 0 | Status: DISCONTINUED | OUTPATIENT
Start: 2025-06-18 | End: 2025-06-19

## 2025-06-18 RX ORDER — FUROSEMIDE 10 MG/ML
20 INJECTION INTRAMUSCULAR; INTRAVENOUS
Refills: 0 | Status: DISCONTINUED | OUTPATIENT
Start: 2025-06-18 | End: 2025-06-19

## 2025-06-18 RX ORDER — APIXABAN 2.5 MG/1
5 TABLET, FILM COATED ORAL
Refills: 0 | Status: DISCONTINUED | OUTPATIENT
Start: 2025-06-18 | End: 2025-06-19

## 2025-06-18 RX ORDER — ACETAMINOPHEN 500 MG/5ML
1000 LIQUID (ML) ORAL ONCE
Refills: 0 | Status: COMPLETED | OUTPATIENT
Start: 2025-06-18 | End: 2025-06-18

## 2025-06-18 RX ORDER — CLOPIDOGREL BISULFATE 75 MG/1
75 TABLET, FILM COATED ORAL DAILY
Refills: 0 | Status: DISCONTINUED | OUTPATIENT
Start: 2025-06-18 | End: 2025-06-18

## 2025-06-18 RX ORDER — METOPROLOL SUCCINATE 50 MG/1
75 TABLET, EXTENDED RELEASE ORAL
Refills: 0 | Status: DISCONTINUED | OUTPATIENT
Start: 2025-06-18 | End: 2025-06-19

## 2025-06-18 RX ORDER — LOSARTAN POTASSIUM 100 MG/1
50 TABLET, FILM COATED ORAL DAILY
Refills: 0 | Status: DISCONTINUED | OUTPATIENT
Start: 2025-06-18 | End: 2025-06-19

## 2025-06-18 RX ORDER — ASPIRIN 325 MG
81 TABLET ORAL EVERY 24 HOURS
Refills: 0 | Status: DISCONTINUED | OUTPATIENT
Start: 2025-06-18 | End: 2025-06-19

## 2025-06-18 RX ORDER — ATORVASTATIN CALCIUM 80 MG/1
40 TABLET, FILM COATED ORAL AT BEDTIME
Refills: 0 | Status: DISCONTINUED | OUTPATIENT
Start: 2025-06-18 | End: 2025-06-19

## 2025-06-18 RX ADMIN — APIXABAN 5 MILLIGRAM(S): 2.5 TABLET, FILM COATED ORAL at 17:29

## 2025-06-18 RX ADMIN — Medication 81 MILLIGRAM(S): at 17:28

## 2025-06-18 RX ADMIN — Medication 1000 MILLIGRAM(S): at 18:39

## 2025-06-18 RX ADMIN — ATORVASTATIN CALCIUM 40 MILLIGRAM(S): 80 TABLET, FILM COATED ORAL at 20:39

## 2025-06-18 RX ADMIN — METOPROLOL SUCCINATE 75 MILLIGRAM(S): 50 TABLET, EXTENDED RELEASE ORAL at 17:29

## 2025-06-18 NOTE — PATIENT PROFILE ADULT - FALL HARM RISK - RISK INTERVENTIONS

## 2025-06-18 NOTE — DISCHARGE NOTE PROVIDER - NSDCMRMEDTOKEN_GEN_ALL_CORE_FT
allopurinol 300 mg oral tablet: 1 tab(s) orally once a day  apixaban 5 mg oral tablet: 1 tab(s) orally 2 times a day resume 1/14/25 evening dose  atorvastatin 40 mg oral tablet: 1 tab(s) orally once a day  CoQ10 100 mg oral capsule: 1 orally once a day  D3-50 1250 mcg (50,000 intl units) oral capsule: 1 cap(s) orally once a week  famotidine 20 mg oral tablet: 1 tab(s) orally once a day as needed for  heartburn  Lasix 20 mg oral tablet: 1 tab(s) orally 2 times a day  Lopressor 50 mg oral tablet: 1.5 orally 2 times a day  losartan 50 mg oral tablet: 1 orally once a day  potassium chloride 20 mEq oral tablet, extended release: 1 tab(s) orally once a day   allopurinol 300 mg oral tablet: 1 tab(s) orally once a day  apixaban 5 mg oral tablet: 1 tab(s) orally 2 times a day resume 1/14/25 evening dose  aspirin 81 mg oral delayed release tablet: 1 tab(s) orally every 24 hours  atorvastatin 40 mg oral tablet: 1 tab(s) orally once a day  CoQ10 100 mg oral capsule: 1 orally once a day  D3-50 1250 mcg (50,000 intl units) oral capsule: 1 cap(s) orally once a week  famotidine 20 mg oral tablet: 1 tab(s) orally once a day as needed for  heartburn  Lasix 20 mg oral tablet: 1 tab(s) orally 2 times a day  Lopressor 50 mg oral tablet: 1.5 orally 2 times a day  losartan 50 mg oral tablet: 1 orally once a day  potassium chloride 20 mEq oral tablet, extended release: 1 tab(s) orally once a day

## 2025-06-18 NOTE — PRE-ANESTHESIA EVALUATION ADULT - NSANTHPMHFT_GEN_ALL_CORE
call patient with normal results Medical history and ROS reviewed  h/o ASD closure, afib (SOB when in a-fib), hyperthyroidism with thyroid nodules (able to lie flat, no dysphagia)  ET limited by back pain, recent cath normal

## 2025-06-18 NOTE — DISCHARGE NOTE PROVIDER - CARE PROVIDER_API CALL
Thad Moreno.  Clinical Cardiac Electrophysiology  [address] [place] [id number]  Phone: [phone]  Fax: [phone]  Established Patient  Scheduled Appointment: 08/12/2025 09:30 AM

## 2025-06-18 NOTE — DISCHARGE NOTE PROVIDER - NSDCFUSCHEDAPPT_GEN_ALL_CORE_FT
Thad Moreno  [facility]  ELECTROPH [address]  Scheduled Appointment: 08/12/2025

## 2025-06-18 NOTE — DISCHARGE NOTE PROVIDER - PROVIDER TOKENS
PROVIDER:[TOKEN:[[id number]],SCHEDULEDAPPT:[08/12/2025],SCHEDULEDAPPTTIME:[09:30 AM],ESTABLISHEDPATIENT:[T]]

## 2025-06-18 NOTE — CHART NOTE - NSCHARTNOTEFT_GEN_A_CORE
PT arrived this am for scheduled AFib Ablation /Watchman device with Dr. Moreno . NPO since 6/17/25 Last dose Eliquis taken this am no missed doses noted. All meds reviewed with pt Pharmacy [facility].  Allergy to Gabapentin . EKG NSR 61bpm Temp 97.7 , 181/89 RR 16 HR 62 O2 98% RA HT 5'5 Weight 228lbs . No acute distress noted. Awaiting Anesthesia and MD Moreno for Consent.     Kacey Rodriguez NP  Cardiology

## 2025-06-18 NOTE — DISCHARGE NOTE PROVIDER - NSDCCPTREATMENT_GEN_ALL_CORE_FT
PRINCIPAL PROCEDURE  Procedure: Intracardiac catheter ablation for atrial fibrillation  Findings and Treatment:       SECONDARY PROCEDURE  Procedure: Insertion, Watchman left atrial appendage closure device  Findings and Treatment:     Procedure: Transesophageal echocardiogram (MER)  Findings and Treatment:      PRINCIPAL PROCEDURE  Procedure: Intracardiac catheter ablation for atrial fibrillation  Findings and Treatment: Atrial fib ablation via RFV access      SECONDARY PROCEDURE  Procedure: Insertion, Watchman left atrial appendage closure device  Findings and Treatment: Watchman BRITTANIE clsoure device placement via RFV access    Procedure: Transesophageal echocardiogram (MER)  Findings and Treatment:

## 2025-06-18 NOTE — DISCHARGE NOTE PROVIDER - NSDCCPCAREPLAN_GEN_ALL_CORE_FT
PRINCIPAL DISCHARGE DIAGNOSIS  Diagnosis: Atrial fibrillation  Assessment and Plan of Treatment: You had an atrial fibrillation ablation and a Watchman device.  Your followup for your watchman will include a MER that will be scheduled by Dr. Moreno's office to evaluate your Watchman device.  If it is functioning properly he will be able to adjust your anticoagulation medication.  You will also have a return visit for your atrial fibrillation ablation.  You will continue your Eliquis until the result of the MER are reviewed.  Continue with your cardiologist and primary care MD. Continue your current medications. Call your physician for palpitations, feelings of rapid heart beat, lightheadedness, or dizziness. Continue your anticoagulation medication as instructed.      SECONDARY DISCHARGE DIAGNOSES  Diagnosis: HTN (hypertension)  Assessment and Plan of Treatment: Continue with your blood pressure medications; eat a heart healthy diet with low salt diet; exercise regularly (consult with your physician or cardiologist first); maintain a heart healthy weight; if you smoke - quit (A resource to help you stop smoking is the [facility] – phone number [phone].); include healthy ways to manage stress. Continue to follow with your primary care physician or cardiologist.    Diagnosis: Diabetes  Assessment and Plan of Treatment: Continue to follow with your primary care MD or your endocrinologist.  Follow a heart healthy diabetic diet. If you check your fingerstick glucose at home, call your MD if it is greater than 250mg/dL on 2 occasions or less than 100mg/dL on 2 occasions. Know signs of low blood sugar, such as: dizziness, shakiness, sweating, confusion, hunger, nervousness-drink 4 ounces apple juice if occurs and call your doctor. Know early signs of high blood sugar, such as: frequent urination, increased thirst, blurry vision, fatigue, headache - call your doctor if this occurs. Follow with other practitioners to care for your diabetes, such as ophthamologist and podiatrist.

## 2025-06-18 NOTE — DISCHARGE NOTE PROVIDER - HOSPITAL COURSE
70 y/o female with PMH of  HTN, AFIB on Eliquis s/p Cardioversion 4/4/25, Hyperparathyroidism, Thyroid nodule (Monitored by Endo, plans for thyroidectomy once afib under control) Gout , OA, CVA, TIA with no residual deficits, enhanced body habitus, chronic back pain (ambulates with Rollator), PFO closure, surgical procedure for May-Thurner Syndrome (MTS) on 5/8/25 with Dr. Phelan with stent placement x1 via left groin. Post op plavix x 30days was referred by Dr. Adan Quesada underwent an atrial fibrillation ablation and Watchman procedure on 6/18/25.   Procedure was performed via right femoral vein and was closed with Vascade initially then failed now with tension suture in right groin with suture removed as per protocol and manual pressure held post removal. (see procedure report for full details). Patient was given standard antibiotic prophylaxis pre procedure. Intra-op MER revealed no pericardial effusion. Post procedure, femoral access site was monitored closely according to protocol and remained stable without bleeding, hematoma, or edema. Home medications were resumed including Eliquis and Metoprolol. Patient remained hemodynamically stable, neurovascularly intact and chest pain free. Patient voided and ambulated and had no EKG changes post procedure.  She remained overnight for observation and site and telemetry monitoring.  Portable CXR done with proper placement of Watchman device.  She will have CXR PA/LAT in am. The remainder of her hospitalization was uneventful.  She was provided education regarding medications, as well as post procedure wound care instructions.  Post ablation follow up appointment was scheduled on 8/12/25 at 9:30 with Dr. Moreno and that he will require an echo in 45 days prior to medication adjustment The patient was advised to return to ER with any concerning symptoms.                   70 y/o female with PMH of  HTN, AFIB on Eliquis s/p Cardioversion 4/4/25, Hyperparathyroidism, Thyroid nodule (Monitored by Endo, plans for thyroidectomy once afib under control) Gout , OA, CVA, TIA with no residual deficits, enhanced body habitus, chronic back pain (ambulates with Rollator), PFO closure, surgical procedure for May-Thurner Syndrome (MTS) on 5/8/25 with Dr. Phelan with stent placement x1 via left groin. Post op plavix x 30days was referred by Dr. Adan Quesada underwent an atrial fibrillation ablation and Watchman procedure on 6/18/25.   Procedure was performed via right femoral vein and was closed with Vascade initially then failed now with tension suture in right groin with suture removed as per protocol and manual pressure held post removal. (see procedure report for full details). Patient was given standard antibiotic prophylaxis pre procedure. Intra-op MER revealed no pericardial effusion. Post procedure, femoral access site was monitored closely according to protocol and remained stable without bleeding, hematoma, or edema. Home medications were resumed including Eliquis and Metoprolol. Patient remained hemodynamically stable, neurovascularly intact and chest pain free. Patient voided and ambulated and had no EKG changes post procedure.  She remained overnight for observation and site and telemetry monitoring.  Portable CXR done with proper placement of Watchman device.  She is s/p CXR PA/LAT with proper placement and r/w Dr. Moreno. The remainder of her hospitalization was uneventful.  She was provided education regarding medications, as well as post procedure wound care instructions.  Post ablation follow up appointment was scheduled on 8/12/25 at 9:30 with Dr. Moreno and that he will require an echo in 45 days prior to medication adjustment The patient was advised to return to ER with any concerning symptoms.

## 2025-06-19 ENCOUNTER — TRANSCRIPTION ENCOUNTER (OUTPATIENT)
Age: 70
End: 2025-06-19

## 2025-06-19 VITALS
RESPIRATION RATE: 18 BRPM | DIASTOLIC BLOOD PRESSURE: 57 MMHG | SYSTOLIC BLOOD PRESSURE: 117 MMHG | TEMPERATURE: 98 F | OXYGEN SATURATION: 98 % | HEART RATE: 66 BPM

## 2025-06-19 DIAGNOSIS — I48.91 UNSPECIFIED ATRIAL FIBRILLATION: ICD-10-CM

## 2025-06-19 DIAGNOSIS — I10 ESSENTIAL (PRIMARY) HYPERTENSION: ICD-10-CM

## 2025-06-19 LAB
ANION GAP SERPL CALC-SCNC: 11 MMOL/L — SIGNIFICANT CHANGE UP (ref 5–17)
BUN SERPL-MCNC: 22 MG/DL — SIGNIFICANT CHANGE UP (ref 7–23)
CALCIUM SERPL-MCNC: 8.8 MG/DL — SIGNIFICANT CHANGE UP (ref 8.4–10.5)
CHLORIDE SERPL-SCNC: 106 MMOL/L — SIGNIFICANT CHANGE UP (ref 96–108)
CO2 SERPL-SCNC: 23 MMOL/L — SIGNIFICANT CHANGE UP (ref 22–31)
CREAT SERPL-MCNC: 0.89 MG/DL — SIGNIFICANT CHANGE UP (ref 0.5–1.3)
EGFR: 70 ML/MIN/1.73M2 — SIGNIFICANT CHANGE UP
EGFR: 70 ML/MIN/1.73M2 — SIGNIFICANT CHANGE UP
GLUCOSE SERPL-MCNC: 127 MG/DL — HIGH (ref 70–99)
HCT VFR BLD CALC: 34 % — LOW (ref 34.5–45)
HGB BLD-MCNC: 11.1 G/DL — LOW (ref 11.5–15.5)
MCHC RBC-ENTMCNC: 31.3 PG — SIGNIFICANT CHANGE UP (ref 27–34)
MCHC RBC-ENTMCNC: 32.6 G/DL — SIGNIFICANT CHANGE UP (ref 32–36)
MCV RBC AUTO: 95.8 FL — SIGNIFICANT CHANGE UP (ref 80–100)
NRBC # BLD AUTO: 0 K/UL — SIGNIFICANT CHANGE UP (ref 0–0)
NRBC # FLD: 0 K/UL — SIGNIFICANT CHANGE UP (ref 0–0)
NRBC BLD AUTO-RTO: 0 /100 WBCS — SIGNIFICANT CHANGE UP (ref 0–0)
PLATELET # BLD AUTO: 226 K/UL — SIGNIFICANT CHANGE UP (ref 150–400)
PMV BLD: 10.3 FL — SIGNIFICANT CHANGE UP (ref 7–13)
POTASSIUM SERPL-MCNC: 4.2 MMOL/L — SIGNIFICANT CHANGE UP (ref 3.5–5.3)
POTASSIUM SERPL-SCNC: 4.2 MMOL/L — SIGNIFICANT CHANGE UP (ref 3.5–5.3)
RBC # BLD: 3.55 M/UL — LOW (ref 3.8–5.2)
RBC # FLD: 14.6 % — HIGH (ref 10.3–14.5)
SODIUM SERPL-SCNC: 140 MMOL/L — SIGNIFICANT CHANGE UP (ref 135–145)
WBC # BLD: 9.28 K/UL — SIGNIFICANT CHANGE UP (ref 3.8–10.5)
WBC # FLD AUTO: 9.28 K/UL — SIGNIFICANT CHANGE UP (ref 3.8–10.5)

## 2025-06-19 PROCEDURE — 93010 ELECTROCARDIOGRAM REPORT: CPT

## 2025-06-19 PROCEDURE — C1766: CPT

## 2025-06-19 PROCEDURE — 99238 HOSP IP/OBS DSCHRG MGMT 30/<: CPT

## 2025-06-19 PROCEDURE — C1733: CPT

## 2025-06-19 PROCEDURE — 71046 X-RAY EXAM CHEST 2 VIEWS: CPT

## 2025-06-19 PROCEDURE — 93656 COMPRE EP EVAL ABLTJ ATR FIB: CPT

## 2025-06-19 PROCEDURE — 71045 X-RAY EXAM CHEST 1 VIEW: CPT

## 2025-06-19 PROCEDURE — C1730: CPT

## 2025-06-19 PROCEDURE — C1893: CPT

## 2025-06-19 PROCEDURE — 80048 BASIC METABOLIC PNL TOTAL CA: CPT

## 2025-06-19 PROCEDURE — 93657 TX L/R ATRIAL FIB ADDL: CPT

## 2025-06-19 PROCEDURE — C1889: CPT

## 2025-06-19 PROCEDURE — 71046 X-RAY EXAM CHEST 2 VIEWS: CPT | Mod: 26

## 2025-06-19 PROCEDURE — C1887: CPT

## 2025-06-19 PROCEDURE — C1769: CPT

## 2025-06-19 PROCEDURE — C9399: CPT

## 2025-06-19 PROCEDURE — C1732: CPT

## 2025-06-19 PROCEDURE — 85027 COMPLETE CBC AUTOMATED: CPT

## 2025-06-19 PROCEDURE — 93005 ELECTROCARDIOGRAM TRACING: CPT

## 2025-06-19 PROCEDURE — C1759: CPT

## 2025-06-19 PROCEDURE — C1894: CPT

## 2025-06-19 PROCEDURE — C1760: CPT

## 2025-06-19 PROCEDURE — 33340 PERQ CLSR TCAT L ATR APNDGE: CPT

## 2025-06-19 RX ORDER — ACETAMINOPHEN 500 MG/5ML
1000 LIQUID (ML) ORAL ONCE
Refills: 0 | Status: COMPLETED | OUTPATIENT
Start: 2025-06-19 | End: 2025-06-19

## 2025-06-19 RX ORDER — ASPIRIN 325 MG
1 TABLET ORAL
Qty: 0 | Refills: 0 | DISCHARGE
Start: 2025-06-19

## 2025-06-19 RX ADMIN — METOPROLOL SUCCINATE 75 MILLIGRAM(S): 50 TABLET, EXTENDED RELEASE ORAL at 06:27

## 2025-06-19 RX ADMIN — APIXABAN 5 MILLIGRAM(S): 2.5 TABLET, FILM COATED ORAL at 06:27

## 2025-06-19 RX ADMIN — Medication 20 MILLIGRAM(S): at 08:52

## 2025-06-19 RX ADMIN — Medication 1000 MILLIGRAM(S): at 09:47

## 2025-06-19 RX ADMIN — Medication 1000 MILLIGRAM(S): at 09:17

## 2025-06-19 RX ADMIN — Medication 300 MILLIGRAM(S): at 06:32

## 2025-06-19 RX ADMIN — FUROSEMIDE 20 MILLIGRAM(S): 10 INJECTION INTRAMUSCULAR; INTRAVENOUS at 06:29

## 2025-06-19 RX ADMIN — LOSARTAN POTASSIUM 50 MILLIGRAM(S): 100 TABLET, FILM COATED ORAL at 06:27

## 2025-06-19 NOTE — PROGRESS NOTE ADULT - SUBJECTIVE AND OBJECTIVE BOX
Patient is a 69y old  Female who presents with a chief complaint of Atrial fibrillation (18 Jun 2025 17:31)          Allergies    gabapentin (Other)    Intolerances        Medications:  allopurinol 300 milliGRAM(s) Oral daily  apixaban 5 milliGRAM(s) Oral two times a day  aspirin enteric coated 81 milliGRAM(s) Oral every 24 hours  atorvastatin 40 milliGRAM(s) Oral at bedtime  ceFAZolin   IVPB 2000 milliGRAM(s) IV Intermittent once  famotidine    Tablet 20 milliGRAM(s) Oral daily  furosemide    Tablet 20 milliGRAM(s) Oral two times a day  losartan 50 milliGRAM(s) Oral daily  metoprolol tartrate 75 milliGRAM(s) Oral two times a day      Vitals:  T(C): 36.7 (06-19-25 @ 00:57), Max: 37.6 (06-18-25 @ 06:23)  HR: 67 (06-19-25 @ 00:57) (60 - 82)  BP: 103/57 (06-19-25 @ 00:57) (101/52 - 181/89)  BP(mean): 75 (06-19-25 @ 00:57) (73 - 93)  RR: 18 (06-19-25 @ 00:57) (16 - 18)  SpO2: 94% (06-19-25 @ 00:57) (93% - 99%)  Wt(kg): --  Daily Height in cm: 165.1 (18 Jun 2025 08:27)    Daily   I&O's Summary    18 Jun 2025 07:01  -  19 Jun 2025 03:37  --------------------------------------------------------  IN: 180 mL / OUT: 0 mL / NET: 180 mL          Physical Exam:  Appearance: Normal  Eyes: PERRL, EOMI  HENT: Normal oral muscosa, NC/AT  Cardiovascular: S1S2, RRR, No M/R/G, no JVD, No Lower extremity edema  Procedural Access Site: No hematoma, Non-tender to palpation, 2+ pulse, No bruit, No Ecchymosis  Respiratory: Clear to auscultation bilaterally  Gastrointestinal: Soft, Non tender, Normal Bowel Sounds  Musculoskeletal: No clubbing, No joint deformity   Neurologic: Non-focal  Lymphatic: No lymphadenopathy  Psychiatry: AAOx3, Mood & affect appropriate  Skin: No rashes, No ecchymoses, No cyanosis    06-19    140  |  106  |  22  ----------------------------<  127[H]  4.2   |  23  |  0.89    Ca    8.8      19 Jun 2025 01:38              Lipid panel   Hgb A1c                         11.1   9.28  )-----------( 226      ( 19 Jun 2025 01:38 )             34.0         ECG: SR 63 bpm

## 2025-06-19 NOTE — PROGRESS NOTE ADULT - PROBLEM SELECTOR PLAN 1
Serial EKG  Telemetry monitoring  Continue Eliquis, Plavix  Monitor right femoral access site for swelling, bleeding

## 2025-06-19 NOTE — DISCHARGE NOTE NURSING/CASE MANAGEMENT/SOCIAL WORK - FINANCIAL ASSISTANCE
[facility] provides services at a reduced cost to those who are determined to be eligible through [facility]’s financial assistance program. Information regarding [facility]’s financial assistance program can be found by going to [email or web address] or by calling [phone].

## 2025-06-19 NOTE — DISCHARGE NOTE NURSING/CASE MANAGEMENT/SOCIAL WORK - PATIENT PORTAL LINK FT
You can access the FollowMyHealth Patient Portal offered by [facility] by registering at the following website: [email or web address]. By joining [facility] [employer]’s FollowMyHealth portal, you will also be able to view your health information using other applications (apps) compatible with our system.

## 2025-06-19 NOTE — DISCHARGE NOTE NURSING/CASE MANAGEMENT/SOCIAL WORK - NSDCPEELIQUISCOMP_GEN_ALL_CORE
Karen was seen today for uri.    Diagnoses and all orders for this visit:    Acute sinusitis, recurrence not specified, unspecified location  -     cefUROXime (CEFTIN) 500 MG tablet; Take 1 tablet by mouth 2 times daily for 10 days.    Pharyngitis, unspecified etiology  -     STREP GROUP A SCREEN RAPID WITH REFLEX TO CULTURE  -     STREP GROUP A CULTURE  -     MONOSPOT WITHOUT REFLEX           Plan:  Recommend plenty of fluids, hydration orally. Tylenol, Advil for pain and fever. Rest. Avoid undue exertion. Watch for any increasing/worsening symptoms.  Recommendation to follow with the primary care physician . If the symptoms worsen at any point, recommend patient go to the emergency room.     Dr. Cristina Leos M.D.  [facility]  [address] [address] [place] [address]  Telephone:  [phone]        Patient Education     Acute Bacterial Rhinosinusitis (ABRS)  Acute bacterial rhinosinusitis (ABRS) is an infection of your nasal cavity and sinuses. It’s caused by bacteria. Acute means that you’ve had symptoms for less than 12 weeks.  Understanding your sinuses  The nasal cavity is the large air-filled space behind your nose. The sinuses are a group of spaces formed by the bones of your face. They connect with your nasal cavity. ABRS causes the tissue lining these spaces to become inflamed. Mucus may not drain normally. This leads to facial pain and other symptoms.  What causes ABRS?  ABRS most often follows an upper respiratory infection caused by a virus. Bacteria then infect the lining of your nasal cavity and sinuses. But you can also get ABRS if you have:  · Nasal allergies  · Long-term nasal swelling and congestion not caused by allergies  · Blockage in the nose  Symptoms of ABRS  The symptoms of ABRS may be different for each person, and can include:  · Nasal congestion  · Runny nose  · Fluid draining from the nose down the throat (postnasal drip)  · Headache  · Cough  · Pain in the sinuses  · Thick,  colored fluid from the nose (mucus)  · Fever  Diagnosing ABRS  ABRS may be diagnosed if you’ve had an upper respiratory infection like a cold and cough for longer than 10 to 14 days. Your health care provider will ask about your symptoms and your medical history. The provider will check your vital signs, including your temperature. You’ll have a physical exam. The health care provider will check your ears, nose, and throat. You likely won’t need any tests. If ABRS comes back, you may have a culture or other tests.  Treatment for ABRS  Treatment may include:  · Antibiotic medicine. This is for symptoms that last for at least 10 to 14 days.  · Nasal corticosteroid medicine. Drops or spray used in the nose can lessen swelling and congestion.  · Over-the-counter pain medicine. This is to lessen sinus pain and pressure.  · Nasal decongestant medicine. Spray or drops may help to lessen congestion. Do not use them for more than a few days.  · Salt wash (saline irrigation). This can help to loosen mucus.  Possible complications of ABRS  ABRS may come back or become long-term (chronic).  In rare cases, ABRS may cause complications such as:   · Inflamed tissue around the brain and spinal cord (meningitis)  · Inflamed tissue around the eyes (orbital cellulitis)  · Inflamed bones around the sinuses (osteitis)  These problems may need to be treated in a hospital with intravenous (IV) antibiotic medicine or surgery.  When to call the health care provider  Call your health care provider if you have any of the following:  · Symptoms that don’t get better, or get worse  · Symptoms that don’t get better after 3 to 5 days on antibiotics  · Trouble seeing  · Swelling around your eyes  · Confusion or trouble staying awake      © 0367-5469 [employer]. [address] [place] [address], [place] [address]. All rights reserved. This information is not intended as a substitute for professional medical care. Always follow your healthcare  professional's instructions.           Patient Education     Pharyngitis (Sore Throat), Report Pending       Pharyngitis (sore throat) is often due to a virus. It can also be caused by the “strep” streptococcus, or strep, bacteria, often called “strep throat” strep throat. Both viral and strep infections can cause throat pain that is worse when swallowing, aching all over with headache, and fever. Both types of infections are contagious. They may be spread by coughing, kissing, or touching others after touching your mouth or nose.  A test has been done to determine whether or not you (or your child, if your child is the patient) have strep throat. Call this facility as directed for the result. If the test is positive for strep infection, treament with antibiotic medications is needed. A prescription can be called in to your pharmacy at that time. If the test is negative, you probably have a viral pharyngitis. This does not require antibiotic treatment. Until you receive the results of the strep test, you should stay home from work. If your child is being tested, he or she should stay home from school.  Home care  · Rest at home. Drink plenty of fluids to avoid dehydration.  · If the test is positive for strep, no work or school for the first 2 days of taking the antibiotics. After this time, you will not be contagious. You can then return to work or school if you are feeling better.   · The antibiotic medication must be taken for the full 10 days, even if you feel better. This is very important to ensure the infection is treated. It is also important to prevent drug-resistent organisms from developing. If you were given an antibiotic shot, no more antibiotics are needed.  · For children: Use acetaminophen for fever, fussiness or discomfort. In infants over six months of age, you may use ibuprofen instead of acetaminophen. (NOTE: If your child has chronic liver or kidney disease or ever had a stomach ulcer or GI  bleeding, talk with your doctor before using these medicines.) (NOTE: Aspirin should never be used in anyone under 18 years of age who is ill with a fever. It may cause severe liver damage.)   · For adults: You may use acetaminophen or ibuprofen to control pain or fever, unless another medicine was prescribed for this. (NOTE: If you have chronic liver or kidney disease or ever had a stomach ulcer or GI bleeding, talk with your doctor before using these medicines.)  · Throat lozenges or numbing throat sprays can help reduce pain. Gargling with warm salt water will also help reduce throat pain. For this, dissolve 1/2 teaspoon of salt in 1 glass of warm water. To help soothe a sore throat, children can sip on juice or a popsicle. Children 5 years and older can also suck on a lollipop or hard candy.  · Avoid salty or spicy foods, which can irritate the throat.  Follow-up care  Follow up with your healthcare provider or our staff if you are not improving over the next week.  When to seek medical advice  Call your healthcare provider right away if any of these occur:  · Fever as directed by your doctor.  For children, seek care if:  ¨ Your child is of any age and has repeated fevers above 104°F (40°C).  ¨ Your child is younger than 2 years of age and has a fever of 100.4°F (38°C) that continues for more than 1 day.  ¨ Your child is 2 years old or older and has a fever of 100.4°F (38°C) that continues for more than 3 days.  · New or worsening ear pain, sinus pain, or headache  · Painful lumps in the back of neck  · Stiff neck  · Lymph nodes are getting larger  · Inability to swallow liquids, excessive drooling, or inability to open mouth wide due to throat pain  · Signs of dehydration (very dark urine or no urine, sunken eyes, dizziness)  · Trouble breathing or noisy breathing  · Muffled voice  · New rash  · Child appears to be getting sicker  © 2379-1729 [employer]. [address] [place] [address], [place]  [address]. All rights reserved. This information is not intended as a substitute for professional medical care. Always follow your healthcare professional's instructions.            Apixaban/Eliquis is used to treat and prevent blood clots. If you are not able to swallow the tablets whole, they may be crushed and mixed in water, apple juice, or applesauce and promptly taken within four hours. Never skip a dose of Apixaban/Eliquis. If you forget to take your Apixaban/Eliquis, take a dose as soon as you remember. If it is almost time for your next Apixaban/Eliquis dose, wait until then and take a regular dose. DO NOT take an extra pill to ‘catch up’.  NEVER TAKE A DOUBLE DOSE. Notify your doctor that you missed a dose. Take Apixaban/Eliquis at the same time each morning and evening. Apixaban/Eliquis may be taken with other medication or food.

## 2025-06-19 NOTE — PROGRESS NOTE ADULT - ASSESSMENT
68 y/o female with above pmhx, 6/18 PFA ablation and Watchman BRITTANIE closure device placement via RFV access.

## 2025-07-01 ENCOUNTER — NON-APPOINTMENT (OUTPATIENT)
Age: 70
End: 2025-07-01

## 2025-07-01 ENCOUNTER — APPOINTMENT (OUTPATIENT)
Dept: ELECTROPHYSIOLOGY | Facility: CLINIC | Age: 70
End: 2025-07-01
Payer: COMMERCIAL

## 2025-07-01 VITALS
BODY MASS INDEX: 38.02 KG/M2 | OXYGEN SATURATION: 96 % | WEIGHT: 225 LBS | HEART RATE: 69 BPM | DIASTOLIC BLOOD PRESSURE: 77 MMHG | SYSTOLIC BLOOD PRESSURE: 136 MMHG

## 2025-07-01 PROCEDURE — 93000 ELECTROCARDIOGRAM COMPLETE: CPT

## 2025-07-01 PROCEDURE — 99213 OFFICE O/P EST LOW 20 MIN: CPT | Mod: 25

## 2025-07-02 ENCOUNTER — OUTPATIENT (OUTPATIENT)
Dept: OUTPATIENT SERVICES | Facility: HOSPITAL | Age: 70
LOS: 1 days | End: 2025-07-02
Payer: COMMERCIAL

## 2025-07-02 ENCOUNTER — RESULT REVIEW (OUTPATIENT)
Age: 70
End: 2025-07-02

## 2025-07-02 ENCOUNTER — APPOINTMENT (OUTPATIENT)
Dept: CV DIAGNOSITCS | Facility: HOSPITAL | Age: 70
End: 2025-07-02

## 2025-07-02 DIAGNOSIS — I25.10 ATHEROSCLEROTIC HEART DISEASE OF NATIVE CORONARY ARTERY WITHOUT ANGINA PECTORIS: ICD-10-CM

## 2025-07-02 DIAGNOSIS — Z98.890 OTHER SPECIFIED POSTPROCEDURAL STATES: Chronic | ICD-10-CM

## 2025-07-02 DIAGNOSIS — Z90.710 ACQUIRED ABSENCE OF BOTH CERVIX AND UTERUS: Chronic | ICD-10-CM

## 2025-07-02 DIAGNOSIS — Z98.1 ARTHRODESIS STATUS: Chronic | ICD-10-CM

## 2025-07-02 PROBLEM — I87.1 COMPRESSION OF VEIN: Chronic | Status: ACTIVE | Noted: 2025-06-04

## 2025-07-02 PROCEDURE — 76376 3D RENDER W/INTRP POSTPROCES: CPT | Mod: 26

## 2025-07-02 PROCEDURE — 93356 MYOCRD STRAIN IMG SPCKL TRCK: CPT

## 2025-07-02 PROCEDURE — 93306 TTE W/DOPPLER COMPLETE: CPT | Mod: 26

## 2025-07-02 PROCEDURE — 93306 TTE W/DOPPLER COMPLETE: CPT

## 2025-07-02 PROCEDURE — 76376 3D RENDER W/INTRP POSTPROCES: CPT

## 2025-08-06 ENCOUNTER — APPOINTMENT (OUTPATIENT)
Dept: CV DIAGNOSITCS | Facility: HOSPITAL | Age: 70
End: 2025-08-06

## 2025-08-12 ENCOUNTER — APPOINTMENT (OUTPATIENT)
Dept: ELECTROPHYSIOLOGY | Facility: CLINIC | Age: 70
End: 2025-08-12
Payer: COMMERCIAL

## 2025-08-12 VITALS
OXYGEN SATURATION: 99 % | HEART RATE: 63 BPM | DIASTOLIC BLOOD PRESSURE: 80 MMHG | SYSTOLIC BLOOD PRESSURE: 136 MMHG | BODY MASS INDEX: 38.02 KG/M2 | WEIGHT: 225 LBS

## 2025-08-12 DIAGNOSIS — I48.91 UNSPECIFIED ATRIAL FIBRILLATION: ICD-10-CM

## 2025-08-12 PROCEDURE — 93000 ELECTROCARDIOGRAM COMPLETE: CPT

## 2025-08-12 PROCEDURE — 99213 OFFICE O/P EST LOW 20 MIN: CPT | Mod: 25
